# Patient Record
Sex: FEMALE | Race: WHITE | Employment: OTHER | URBAN - METROPOLITAN AREA
[De-identification: names, ages, dates, MRNs, and addresses within clinical notes are randomized per-mention and may not be internally consistent; named-entity substitution may affect disease eponyms.]

---

## 2018-01-26 ENCOUNTER — HOSPITAL ENCOUNTER (OUTPATIENT)
Dept: OTHER | Age: 78
Discharge: HOME | End: 2018-01-26
Attending: INTERNAL MEDICINE | Admitting: INTERNAL MEDICINE

## 2018-01-27 LAB
ESTIMATED AVERAGE GLUCOSE: 180 MG/DL
HBA1C MFR BLD: 7.9 %

## 2018-02-22 NOTE — PRE-PROCEDURE INSTRUCTIONS
1.  Do not eat or drink anything after 12 midnight prior to surgery. This includes no water, chewing gum or mints. 2.  Take the following pills with a small sip of water on the morning of surgery   3. Aspirin, Ibuprofen, Advil, Naproxen, Vitamin E and other Anti-inflammatory products should be stopped for 5 days before surgery or as directed by your physician. 4.  Check with your doctor regarding stopping Plavix, Coumadin, Lovenox, Fragmin or other blood thinners. 5.  Do not smoke and do not drink alcoholic beverages 24 hours prior to surgery. This includes NA Beer. 6.  You may brush your teeth and gargle the morning of surgery. DO NOT SWALLOW WATER.  7.  You MUST make arrangements for a responsible adult to take you home after your surgery. You will not be allowed to leave alone or drive yourself home. It is strongly suggested someone stay with you the first 24 hours. Your surgery will be cancelled if you do not have a ride home. 8.  A parent/legal guardian must accompany a child scheduled for surgery and plan to stay at the hospital until the child is discharged. Please do not bring other children with you. 9.  Please wear simple, loose fitting clothing to the hospital.  Kiet Lee not bring valuables ( money, credit cards, checkbooks, etc.)  Do not wear any makeup (including no eye makeup) or nail polish on your fingers or toes. 10.  Do not wear any jewelry or piercing on the day of surgery. All body piercing jewelry must be removed. 11.  If you have dentures, they will be removed before going to the OR; we will provide you a container. If you wear contact lenses or glasses, they will be removed; please bring a case for them. 12.  Please see your family doctor/pediatrician for a history & physical and/or concerning medications. Bring any test results/reports from your physician's office the day of surgery. 15.  Remember to bring Blood Bank Bracelet to the hospital on the day of surgery.   14.  If you have a Living Will and Durable Power of  for Healthcare, please bring in a copy. 13.  Notify your Surgeon if you develop any illness between now and surgery time; cough, cold, fever, sore throat, nausea, vomiting, etc.  Please notify your surgeon if you experience dizziness, shortness of breath or blurred vision between now and the time of your surgery. 16.  DO NOT shave your operative site 96 hours (4 days) prior to surgery. For face and neck surgery, men may use an electric razor 48 hours (2 days) prior to surgery. 17. Shower the night before surgery and the morning of surgery with  an antibacterial soap   or  Chlorhexidine gluconate (for total joint replacement). To provide excellent care, visitors will be limited to two in a room at any given time. Please no children under the age of 15 in the surgical department.

## 2018-02-26 ENCOUNTER — TELEPHONE (OUTPATIENT)
Dept: FAMILY MEDICINE CLINIC | Age: 78
End: 2018-02-26

## 2018-02-27 NOTE — TELEPHONE ENCOUNTER
Spoke w/ patient - just had a full PE so will not need EKG, last set of labs for fasting was about 4-6 months ago. She cannot make morning appts very well due to having a son at home on a vent and the [facility] RN doesn't come until after 11.  Made her an afternoon appt and will come back on another day for the BW

## 2018-03-01 ENCOUNTER — HOSPITAL ENCOUNTER (OUTPATIENT)
Dept: SURGERY | Age: 78
Discharge: HOME | End: 2018-03-01
Attending: OPHTHALMOLOGY | Admitting: OPHTHALMOLOGY

## 2018-03-01 VITALS
TEMPERATURE: 97.9 F | OXYGEN SATURATION: 97 % | SYSTOLIC BLOOD PRESSURE: 168 MMHG | BODY MASS INDEX: 31.71 KG/M2 | RESPIRATION RATE: 16 BRPM | HEIGHT: 67 IN | HEART RATE: 74 BPM | DIASTOLIC BLOOD PRESSURE: 64 MMHG | WEIGHT: 202 LBS

## 2018-03-01 RX ORDER — LIDOCAINE HYDROCHLORIDE 10 MG/ML
1 INJECTION, SOLUTION EPIDURAL; INFILTRATION; INTRACAUDAL; PERINEURAL
Status: COMPLETED | OUTPATIENT
Start: 2018-03-01 | End: 2018-03-01

## 2018-03-01 RX ORDER — MEPERIDINE HYDROCHLORIDE 25 MG/ML
12.5 INJECTION INTRAMUSCULAR; INTRAVENOUS; SUBCUTANEOUS EVERY 5 MIN PRN
Status: DISCONTINUED | OUTPATIENT
Start: 2018-03-01 | End: 2018-03-02 | Stop reason: HOSPADM

## 2018-03-01 RX ORDER — HYDRALAZINE HYDROCHLORIDE 20 MG/ML
5 INJECTION INTRAMUSCULAR; INTRAVENOUS EVERY 10 MIN PRN
Status: DISCONTINUED | OUTPATIENT
Start: 2018-03-01 | End: 2018-03-02 | Stop reason: HOSPADM

## 2018-03-01 RX ORDER — PREDNISOLONE ACETATE 10 MG/ML
1 SUSPENSION/ DROPS OPHTHALMIC SEE ADMIN INSTRUCTIONS
Status: DISCONTINUED | OUTPATIENT
Start: 2018-03-01 | End: 2018-03-02 | Stop reason: HOSPADM

## 2018-03-01 RX ORDER — DIPHENHYDRAMINE HYDROCHLORIDE 50 MG/ML
12.5 INJECTION INTRAMUSCULAR; INTRAVENOUS
Status: ACTIVE | OUTPATIENT
Start: 2018-03-01 | End: 2018-03-01

## 2018-03-01 RX ORDER — HYDROMORPHONE HCL 110MG/55ML
0.5 PATIENT CONTROLLED ANALGESIA SYRINGE INTRAVENOUS EVERY 5 MIN PRN
Status: DISCONTINUED | OUTPATIENT
Start: 2018-03-01 | End: 2018-03-02 | Stop reason: HOSPADM

## 2018-03-01 RX ORDER — SODIUM CHLORIDE 0.9 % (FLUSH) 0.9 %
10 SYRINGE (ML) INJECTION PRN
Status: DISCONTINUED | OUTPATIENT
Start: 2018-03-01 | End: 2018-03-02 | Stop reason: HOSPADM

## 2018-03-01 RX ORDER — SODIUM CHLORIDE, SODIUM LACTATE, POTASSIUM CHLORIDE, CALCIUM CHLORIDE 600; 310; 30; 20 MG/100ML; MG/100ML; MG/100ML; MG/100ML
INJECTION, SOLUTION INTRAVENOUS CONTINUOUS
Status: DISCONTINUED | OUTPATIENT
Start: 2018-03-01 | End: 2018-03-02 | Stop reason: HOSPADM

## 2018-03-01 RX ORDER — SODIUM CHLORIDE 0.9 % (FLUSH) 0.9 %
10 SYRINGE (ML) INJECTION EVERY 12 HOURS SCHEDULED
Status: DISCONTINUED | OUTPATIENT
Start: 2018-03-01 | End: 2018-03-02 | Stop reason: HOSPADM

## 2018-03-01 RX ORDER — MORPHINE SULFATE 4 MG/ML
1 INJECTION, SOLUTION INTRAMUSCULAR; INTRAVENOUS EVERY 5 MIN PRN
Status: DISCONTINUED | OUTPATIENT
Start: 2018-03-01 | End: 2018-03-02 | Stop reason: HOSPADM

## 2018-03-01 RX ORDER — LABETALOL HYDROCHLORIDE 5 MG/ML
5 INJECTION, SOLUTION INTRAVENOUS EVERY 10 MIN PRN
Status: DISCONTINUED | OUTPATIENT
Start: 2018-03-01 | End: 2018-03-02 | Stop reason: HOSPADM

## 2018-03-01 RX ORDER — TOBRAMYCIN AND DEXAMETHASONE 3; 1 MG/ML; MG/ML
1 SUSPENSION/ DROPS OPHTHALMIC SEE ADMIN INSTRUCTIONS
Status: DISCONTINUED | OUTPATIENT
Start: 2018-03-01 | End: 2018-03-02 | Stop reason: HOSPADM

## 2018-03-01 RX ORDER — METOCLOPRAMIDE HYDROCHLORIDE 5 MG/ML
10 INJECTION INTRAMUSCULAR; INTRAVENOUS
Status: ACTIVE | OUTPATIENT
Start: 2018-03-01 | End: 2018-03-01

## 2018-03-01 RX ORDER — OXYCODONE HYDROCHLORIDE 5 MG/1
5 TABLET ORAL PRN
Status: ACTIVE | OUTPATIENT
Start: 2018-03-01 | End: 2018-03-01

## 2018-03-01 RX ORDER — HYDROMORPHONE HCL 110MG/55ML
0.25 PATIENT CONTROLLED ANALGESIA SYRINGE INTRAVENOUS EVERY 5 MIN PRN
Status: DISCONTINUED | OUTPATIENT
Start: 2018-03-01 | End: 2018-03-02 | Stop reason: HOSPADM

## 2018-03-01 RX ORDER — OXYCODONE HYDROCHLORIDE 5 MG/1
10 TABLET ORAL PRN
Status: ACTIVE | OUTPATIENT
Start: 2018-03-01 | End: 2018-03-01

## 2018-03-01 RX ORDER — PROMETHAZINE HYDROCHLORIDE 25 MG/ML
6.25 INJECTION, SOLUTION INTRAMUSCULAR; INTRAVENOUS
Status: ACTIVE | OUTPATIENT
Start: 2018-03-01 | End: 2018-03-01

## 2018-03-01 RX ADMIN — SODIUM CHLORIDE, SODIUM LACTATE, POTASSIUM CHLORIDE, CALCIUM CHLORIDE: 600; 310; 30; 20 INJECTION, SOLUTION INTRAVENOUS at 10:20

## 2018-03-01 RX ADMIN — LIDOCAINE HYDROCHLORIDE 1 ML: 10 INJECTION, SOLUTION EPIDURAL; INFILTRATION; INTRACAUDAL; PERINEURAL at 10:20

## 2018-03-01 ASSESSMENT — PAIN - FUNCTIONAL ASSESSMENT: PAIN_FUNCTIONAL_ASSESSMENT: 0-10

## 2018-03-01 ASSESSMENT — PAIN SCALES - GENERAL
PAINLEVEL_OUTOF10: 0
PAINLEVEL_OUTOF10: 0

## 2018-03-01 NOTE — ANESTHESIA POST-OP
Postoperative Anesthesia Note    Name:    Malena Cadet  MRN:      [id number]    Patient Vitals for the past 12 hrs:   BP Temp Temp src Pulse Resp SpO2   03/01/18 1327 (!) 168/64 - Temporal 74 16 97 %   03/01/18 1239 (!) 171/67 97.9 °F (36.6 °C) Temporal 75 16 97 %   03/01/18 1005 (!) 145/84 99 °F (37.2 °C) Temporal 85 16 96 %        LABS:    CBC  Lab Results   Component Value Date/Time    WBC 6.5 05/25/2016 10:45 AM    HGB 11.5 (L) 05/25/2016 10:45 AM    HCT 34.3 (L) 05/25/2016 10:45 AM     05/25/2016 10:45 AM     RENAL  Lab Results   Component Value Date/Time     05/25/2016 10:45 AM    K 4.3 05/25/2016 10:45 AM     05/25/2016 10:45 AM    CO2 24 05/25/2016 10:45 AM    BUN 20 05/25/2016 10:45 AM    CREATININE 0.9 05/25/2016 10:45 AM    GLUCOSE 159 (H) 05/25/2016 10:45 AM     COAGS  No results found for: PROTIME, INR, APTT    Intake & Output: In: 400 [I.V.:400]  Out: -     Nausea & Vomiting:  No    Level of Consciousness:  Awake    Pain Assessment:  Adequate analgesia    Anesthesia Complications:  No apparent anesthetic complications    SUMMARY      Vital signs stable  OK to discharge from Stage I post anesthesia care.   Care transferred from Anesthesiology department on discharge from perioperative area

## 2018-03-01 NOTE — ANESTHESIA PRE-OP
Department of Anesthesiology  Preprocedure Note       Name:  Yevgeniy Chow   Age:  69 y.o.  :  1940                                          MRN:  [id number]         Date:  3/1/2018      Surgeon: Tania Boone    Procedure: Phacoemulsification with Intraocular Lens Implant of the Right Eye    Medications prior to admission:   Prior to Admission medications    Medication Sig Start Date End Date Taking? Authorizing Provider   hydrochlorothiazide (HYDRODIURIL) 25 MG tablet TAKE ONE TABLET BY MOUTH DAILY 1/9/15  Yes Myra Robles MD   levothyroxine (SYNTHROID) 100 MCG tablet Take 1 tablet by mouth daily for 30 days. 13 Yes Myra Robles MD   glipiZIDE (GLUCOTROL) 5 MG tablet Take 1 tablet by mouth 2 times daily. Patient taking differently: Take 10 mg by mouth 2 times daily  13  Yes Myra Robles MD   losartan (COZAAR) 50 MG tablet Take 50 mg by mouth daily. Yes Historical Provider, MD       Current medications:    Current Outpatient Prescriptions   Medication Sig Dispense Refill    hydrochlorothiazide (HYDRODIURIL) 25 MG tablet TAKE ONE TABLET BY MOUTH DAILY 30 tablet 0    levothyroxine (SYNTHROID) 100 MCG tablet Take 1 tablet by mouth daily for 30 days. 30 tablet 2    glipiZIDE (GLUCOTROL) 5 MG tablet Take 1 tablet by mouth 2 times daily. (Patient taking differently: Take 10 mg by mouth 2 times daily ) 60 tablet 2    losartan (COZAAR) 50 MG tablet Take 50 mg by mouth daily.        Current Facility-Administered Medications   Medication Dose Route Frequency Provider Last Rate Last Dose    bromfenac (PROLENSA) 0.07 % ophthalmic solution 1 drop  1 drop Right Eye See Admin Instructions Kyra Warren MD        bupivacaine 0.75%, phenylephrine 10%, tropicamide 1%, cyclopentolate 1%, moxifloxacin 0.5%, ketorolac 0.5% in lidocaine 2% jelly ophthalmic solution  0.3 mL Right Eye Q10 Min PRN Kyra Warren MD   0.3 mL at 18 1016    prednisoLONE acetate (PRED

## 2018-03-01 NOTE — PROGRESS NOTES
Assessment unchanged from previous. Verbalizes understanding of discharge instructions and written instructions also given to patient. Questions and concerns addressed at this time. Pt's friend at bedside. Denies pain or any needs at this time. IV d/c'd. Pt discharged via wheelchair to car in good condition. All personal belongings returned to pt.

## 2018-03-08 ENCOUNTER — OFFICE VISIT (OUTPATIENT)
Dept: FAMILY MEDICINE CLINIC | Age: 78
End: 2018-03-08

## 2018-03-08 VITALS
DIASTOLIC BLOOD PRESSURE: 70 MMHG | BODY MASS INDEX: 31.67 KG/M2 | SYSTOLIC BLOOD PRESSURE: 128 MMHG | HEART RATE: 86 BPM | OXYGEN SATURATION: 98 % | WEIGHT: 202.2 LBS

## 2018-03-08 DIAGNOSIS — M89.9 BONE DISORDER: ICD-10-CM

## 2018-03-08 DIAGNOSIS — M25.561 CHRONIC PAIN OF RIGHT KNEE: ICD-10-CM

## 2018-03-08 DIAGNOSIS — E78.2 MIXED HYPERLIPIDEMIA: ICD-10-CM

## 2018-03-08 DIAGNOSIS — E11.65 TYPE 2 DIABETES MELLITUS WITH HYPERGLYCEMIA, WITHOUT LONG-TERM CURRENT USE OF INSULIN (HCC): Primary | ICD-10-CM

## 2018-03-08 DIAGNOSIS — E03.9 HYPOTHYROIDISM, UNSPECIFIED TYPE: ICD-10-CM

## 2018-03-08 DIAGNOSIS — Z12.31 ENCOUNTER FOR SCREENING MAMMOGRAM FOR MALIGNANT NEOPLASM OF BREAST: ICD-10-CM

## 2018-03-08 DIAGNOSIS — G89.29 CHRONIC PAIN OF RIGHT KNEE: ICD-10-CM

## 2018-03-08 DIAGNOSIS — Z12.39 ENCOUNTER FOR BREAST CANCER SCREENING OTHER THAN MAMMOGRAM: ICD-10-CM

## 2018-03-08 PROBLEM — Z78.9 STATIN INTOLERANCE: Status: ACTIVE | Noted: 2017-06-14

## 2018-03-08 PROBLEM — R01.1 HEART MURMUR: Status: ACTIVE | Noted: 2018-03-08

## 2018-03-08 PROBLEM — E05.90 THYROTOXICOSIS: Status: ACTIVE | Noted: 2018-03-08

## 2018-03-08 LAB — HBA1C MFR BLD: 7.9 %

## 2018-03-08 PROCEDURE — G8400 PT W/DXA NO RESULTS DOC: HCPCS | Performed by: NURSE PRACTITIONER

## 2018-03-08 PROCEDURE — G8484 FLU IMMUNIZE NO ADMIN: HCPCS | Performed by: NURSE PRACTITIONER

## 2018-03-08 PROCEDURE — 4040F PNEUMOC VAC/ADMIN/RCVD: CPT | Performed by: NURSE PRACTITIONER

## 2018-03-08 PROCEDURE — 1036F TOBACCO NON-USER: CPT | Performed by: NURSE PRACTITIONER

## 2018-03-08 PROCEDURE — G8427 DOCREV CUR MEDS BY ELIG CLIN: HCPCS | Performed by: NURSE PRACTITIONER

## 2018-03-08 PROCEDURE — 1123F ACP DISCUSS/DSCN MKR DOCD: CPT | Performed by: NURSE PRACTITIONER

## 2018-03-08 PROCEDURE — G8417 CALC BMI ABV UP PARAM F/U: HCPCS | Performed by: NURSE PRACTITIONER

## 2018-03-08 PROCEDURE — 83036 HEMOGLOBIN GLYCOSYLATED A1C: CPT | Performed by: NURSE PRACTITIONER

## 2018-03-08 PROCEDURE — 1090F PRES/ABSN URINE INCON ASSESS: CPT | Performed by: NURSE PRACTITIONER

## 2018-03-08 PROCEDURE — 99204 OFFICE O/P NEW MOD 45 MIN: CPT | Performed by: NURSE PRACTITIONER

## 2018-03-08 RX ORDER — GLIPIZIDE 10 MG/1
TABLET, FILM COATED, EXTENDED RELEASE ORAL 2 TIMES DAILY
COMMUNITY
Start: 2018-02-15 | End: 2018-03-13 | Stop reason: SDUPTHER

## 2018-03-08 ASSESSMENT — PATIENT HEALTH QUESTIONNAIRE - PHQ9
1. LITTLE INTEREST OR PLEASURE IN DOING THINGS: 0
SUM OF ALL RESPONSES TO PHQ9 QUESTIONS 1 & 2: 0
2. FEELING DOWN, DEPRESSED OR HOPELESS: 0
SUM OF ALL RESPONSES TO PHQ QUESTIONS 1-9: 0

## 2018-03-08 ASSESSMENT — ENCOUNTER SYMPTOMS
RESPIRATORY NEGATIVE: 1
BACK PAIN: 0
EYES NEGATIVE: 1
GASTROINTESTINAL NEGATIVE: 1

## 2018-03-08 NOTE — PROGRESS NOTES
states that she tries to watch her carb intake; however, she likes her bread. Patient admits that she is taking her glipizide at home. Patient states that she used to walk more; however, since her fall 3 years ago that caused her to get right knee surgery she does not walk as much. She states she is really active though. She states she moves her grass and takes care of her yard. Patient denies any episodes of hypoglycemia. Patient denies any increased hunger, increased thirst, increased urination. Patient states she is currently taking levothyroxine 100 µg daily. She states she is doing well on the medication. She takes medication every day in the morning on empty stomach with water. She denies any weight changes, palpitations, diarrhea, dry skin, constipation, changes in her energy level. Patient states she does have chronic right knee pain. She states she tries to stretch out her knee daily. Patient states she does not take any medication for her knee pain and does not want to. Patient states she is not interested in physical therapy at this time really to time constraints with her taking care of her son. Patient meds that she had cataract surgery last week in her left eye. She is scheduled to have her right eye surgery in the future. She does not know when her next cataract surgery on her right eye is going to be. Dr. Bety Humphries did her eye surgery.     Past Medical History:   Diagnosis Date    Hypertension     Hypothyroidism     Type 2 diabetes mellitus without complication ([address]     Type II or unspecified type diabetes mellitus without mention of complication, not stated as uncontrolled       Past Surgical History:   Procedure Laterality Date    ANKLE FRACTURE SURGERY Right 2007    CARPAL TUNNEL RELEASE Left 1998    CATARACT REMOVAL WITH IMPLANT Right 03/01/2018     PHACO EMULSIFICATION OF CATARACT WITH INTRA OCULAR LENS IMPLANT RIGHT EYE    HYSTERECTOMY      KNEE ARTHROSCOPY Right 2015       Family Cuff Size: Large Adult   Pulse: 86   SpO2: 98%   Weight: 202 lb 3.2 oz (91.7 kg)     Wt Readings from Last 3 Encounters:   03/08/18 202 lb 3.2 oz (91.7 kg)   02/22/18 202 lb (91.6 kg)   08/21/13 219 lb (99.3 kg)     Temp Readings from Last 3 Encounters:   03/01/18 97.9 °F (36.6 °C) (Temporal)     BP Readings from Last 3 Encounters:   03/08/18 128/70   03/01/18 (!) 168/64   08/21/13 142/80     Pulse Readings from Last 3 Encounters:   03/08/18 86   03/01/18 74   08/21/13 76     Physical Exam   Constitutional: She is oriented to person, place, and time. She appears well-developed and well-nourished. No distress. Obese    HENT:   Head: Normocephalic and atraumatic. Right Ear: External ear normal.   Left Ear: External ear normal.   Nose: Nose normal.   Mouth/Throat: Oropharynx is clear and moist. No oropharyngeal exudate. Eyes: Conjunctivae and EOM are normal. Pupils are equal, round, and reactive to light. Right eye exhibits no discharge. Left eye exhibits no discharge. No scleral icterus. Neck: Normal range of motion. Neck supple. No JVD present. No tracheal deviation present. No thyromegaly present. Cardiovascular: Normal rate, regular rhythm and intact distal pulses. Exam reveals no gallop and no friction rub. Murmur heard. Systolic murmur is present with a grade of 2/6   Pulmonary/Chest: Effort normal and breath sounds normal. No stridor. No respiratory distress. She has no wheezes. She has no rales. She exhibits no tenderness. Abdominal: Soft. Bowel sounds are normal. She exhibits no distension and no mass. There is no tenderness. There is no rebound and no guarding. Musculoskeletal: Normal range of motion. She exhibits edema (BLE - trace). She exhibits no tenderness or deformity. Lymphadenopathy:     She has no cervical adenopathy. Neurological: She is alert and oriented to person, place, and time. Skin: Skin is warm and dry. No rash noted. She is not diaphoretic. No erythema.  No outside. Hypothyroid symptoms stable. Will continue on levothyroxin daily. Recommend blood work in 3 months. Recommend appointment in 3 months. Patient is to get blood work one week prior to her appointment. Diagnoses and all orders for this visit:    Type 2 diabetes mellitus with hyperglycemia, without long-term current use of insulin (HCC)  -     POCT glycosylated hemoglobin (Hb A1C)  -     CBC Auto Differential; Future  -     Comprehensive Metabolic Panel; Future  -     Hemoglobin A1C; Future  -     Magnesium; Future    Chronic pain of right knee    Encounter for breast cancer screening other than mammogram    Encounter for screening mammogram for malignant neoplasm of breast  -     Mammography Screening    Hypothyroidism, unspecified type  -     T4, Free; Future  -     TSH without Reflex; Future    Bone disorder  -     Vitamin D 25 Hydroxy; Future    Mixed hyperlipidemia  -     Lipid Panel; Future    I have recommended that this patient have a dexa scan, shingles vaccine, TDAP but she declines at this time. I have discussed the risks and benefits of this procedure with her. The patient verbalizes understanding. Prior to Visit Medications    Medication Sig Taking? Authorizing Provider   glipiZIDE (GLUCOTROL XL) 10 MG extended release tablet 2 times daily Yes Historical Provider, MD   aspirin 81 MG tablet Take 81 mg by mouth Yes Historical Provider, MD   hydrochlorothiazide (HYDRODIURIL) 25 MG tablet TAKE ONE TABLET BY MOUTH DAILY Yes Lorne Hernandez MD   losartan (COZAAR) 50 MG tablet Take 50 mg by mouth daily. Yes Historical Provider, MD   levothyroxine (SYNTHROID) 100 MCG tablet Take 1 tablet by mouth daily for 30 days. Lorne Hernandez MD     No orders of the defined types were placed in this encounter. Return in about 3 months (around 6/8/2018) for DM - follow up .     Patient should call the office immediately with new or ongoing signs or symptoms or worsening, or

## 2018-03-08 NOTE — PATIENT INSTRUCTIONS
you take decongestants or anti-inflammatory medicine, such as ibuprofen. Some of these medicines can raise blood pressure. · Learn how to check your blood pressure at home. Lifestyle changes  · Stay at a healthy weight. This is especially important if you put on weight around the waist. Losing even 10 pounds can help you lower your blood pressure. · If your doctor recommends it, get more exercise. Walking is a good choice. Bit by bit, increase the amount you walk every day. Try for at least 30 minutes on most days of the week. You also may want to swim, bike, or do other activities. · Avoid or limit alcohol. Talk to your doctor about whether you can drink any alcohol. · Try to limit how much sodium you eat to less than 2,300 milligrams (mg) a day. Your doctor may ask you to try to eat less than 1,500 mg a day. · Eat plenty of fruits (such as bananas and oranges), vegetables, legumes, whole grains, and low-fat dairy products. · Lower the amount of saturated fat in your diet. Saturated fat is found in animal products such as milk, cheese, and meat. Limiting these foods may help you lose weight and also lower your risk for heart disease. · Do not smoke. Smoking increases your risk for heart attack and stroke. If you need help quitting, talk to your doctor about stop-smoking programs and medicines. These can increase your chances of quitting for good. When should you call for help? Call 911 anytime you think you may need emergency care. This may mean having symptoms that suggest that your blood pressure is causing a serious heart or blood vessel problem. Your blood pressure may be over 180/110. ? For example, call 911 if:  ? · You have symptoms of a heart attack. These may include:  ¨ Chest pain or pressure, or a strange feeling in the chest.  ¨ Sweating. ¨ Shortness of breath. ¨ Nausea or vomiting.   ¨ Pain, pressure, or a strange feeling in the back, neck, jaw, or upper belly or in one or both shoulders or arms.  ¨ Lightheadedness or sudden weakness. ¨ A fast or irregular heartbeat. ? · You have symptoms of a stroke. These may include:  ¨ Sudden numbness, tingling, weakness, or loss of movement in your face, arm, or leg, especially on only one side of your body. ¨ Sudden vision changes. ¨ Sudden trouble speaking. ¨ Sudden confusion or trouble understanding simple statements. ¨ Sudden problems with walking or balance. ¨ A sudden, severe headache that is different from past headaches. ? · You have severe back or belly pain. ?Do not wait until your blood pressure comes down on its own. Get help right away. ?Call your doctor now or seek immediate care if:  ? · Your blood pressure is much higher than normal (such as 180/110 or higher), but you don't have symptoms. ? · You think high blood pressure is causing symptoms, such as:  ¨ Severe headache. ¨ Blurry vision. ? Watch closely for changes in your health, and be sure to contact your doctor if:  ? · Your blood pressure measures 140/90 or higher at least 2 times. That means the top number is 140 or higher or the bottom number is 90 or higher, or both. ? · You think you may be having side effects from your blood pressure medicine. ? · Your blood pressure is usually normal, but it goes above normal at least 2 times. Where can you learn more? Go to https://Pocket Videope[employer]antrange.[employer]. org and sign in to your [employer] account. Enter U741 in the [employer] box to learn more about \"High Blood Pressure: Care Instructions. \"     If you do not have an account, please click on the \"Sign Up Now\" link. Current as of: Adelina 10, 2017  Content Version: 11.5  © 7115-2026 [employer]. Care instructions adapted under license by [facility] ([facility]).  If you have questions about a medical condition or this instruction, always ask your healthcare professional. Shayla Francisco any warranty or liability for your use of this information. Patient Education        Patellofemoral Pain Syndrome (Runner's Knee): Exercises  Your Care Instructions  Here are some examples of typical rehabilitation exercises for your condition. Start each exercise slowly. Ease off the exercise if you start to have pain. Your doctor or physical therapist will tell you when you can start these exercises and which ones will work best for you. How to do the exercises  Calf wall stretch    1. Stand facing a wall with your hands on the wall at about eye level. Put your affected leg about a step behind your other leg. 2. Keeping your back leg straight and your back heel on the floor, bend your front knee and gently bring your hip and chest toward the wall until you feel a stretch in the calf of your back leg. 3. Hold the stretch for at least 15 to 30 seconds. 4. Repeat 2 to 4 times. 5. Repeat steps 1 through 4, but this time keep your back knee bent. Quadriceps stretch    1. If you are not steady on your feet, hold on to a chair, counter, or wall. 2. Bend your affected leg, and reach behind you to grab the front of your foot or ankle with the hand on the same side. For example, if you are stretching your right leg, use your right hand. 3. Keeping your knees next to each other, pull your foot toward your buttock until you feel a gentle stretch across the front of your hip and down the front of your thigh. Your knee should be pointed directly to the ground, and not out to the side. 4. Hold the stretch for at least 15 to 30 seconds. 5. Repeat 2 to 4 times. Hamstring wall stretch    1. Lie on your back in a doorway, with your good leg through the open door. 2. Slide your affected leg up the wall to straighten your knee. You should feel a gentle stretch down the back of your leg. 1. Do not arch your back. 2. Do not bend either knee. 3. Keep one heel touching the floor and the other heel touching the wall. Do not point your toes.   3. Hold the stretch for ceiling). 2. Lift your toes about 6 inches off the floor, hold for about 6 seconds, then lower slowly. 3. Do 8 to 12 repetitions. Wall slide with ball squeeze    1. Stand with your back against a wall and with your feet about shoulder-width apart. Your feet should be about 12 inches away from the wall. 2. Put a ball about the size of a soccer ball between your knees. Then slowly slide down the wall until your knees are bent about 20 to 30 degrees. 3. Tighten your thigh muscles by squeezing the ball between your knees. Hold that position for about 10 seconds, then stop squeezing. Rest for up to 10 seconds between repetitions. 4. Repeat 8 to 12 times. Follow-up care is a key part of your treatment and safety. Be sure to make and go to all appointments, and call your doctor if you are having problems. It's also a good idea to know your test results and keep a list of the medicines you take. Where can you learn more? Go to https://[employer]att Technologies.ScreenHits. org and sign in to your [employer] account. Enter A404 in the [employer] box to learn more about \"Patellofemoral Pain Syndrome (Runner's Knee): Exercises. \"     If you do not have an account, please click on the \"Sign Up Now\" link. Current as of: March 21, 2017  Content Version: 11.5  © 2672-3875 [employer]. Care instructions adapted under license by [facility] If you have questions about a medical condition or this instruction, always ask your healthcare professional. [place] [address] any warranty or liability for your use of this information. Patient Education        Saline Nasal Washes: Care Instructions  Your Care Instructions  Saline nasal washes help keep the nasal passages open by washing out thick or dried mucus. This simple remedy can help relieve symptoms of allergies, sinusitis, and colds.  It also can make the nose feel more comfortable by keeping the mucous membranes moist. You may notice a 5681-8720 [employer]. Care instructions adapted under license by [facility] ([facility]). If you have questions about a medical condition or this instruction, always ask your healthcare professional. [address] any warranty or liability for your use of this information.

## 2018-03-13 RX ORDER — GLIPIZIDE 10 MG/1
10 TABLET, FILM COATED, EXTENDED RELEASE ORAL 2 TIMES DAILY
Qty: 60 TABLET | Refills: 2 | Status: SHIPPED | OUTPATIENT
Start: 2018-03-13 | End: 2018-07-01 | Stop reason: SDUPTHER

## 2018-03-26 DIAGNOSIS — Z12.39 SCREENING FOR BREAST CANCER: Primary | ICD-10-CM

## 2018-05-09 ENCOUNTER — HOSPITAL ENCOUNTER (OUTPATIENT)
Dept: MAMMOGRAPHY | Age: 78
Discharge: HOME | End: 2018-05-09
Attending: NURSE PRACTITIONER | Admitting: NURSE PRACTITIONER

## 2018-05-09 DIAGNOSIS — Z12.31 ENCOUNTER FOR SCREENING MAMMOGRAM FOR BREAST CANCER: ICD-10-CM

## 2018-05-30 RX ORDER — LEVOTHYROXINE SODIUM 0.1 MG/1
100 TABLET ORAL DAILY
Qty: 90 TABLET | Refills: 0 | Status: SHIPPED | OUTPATIENT
Start: 2018-05-30 | End: 2018-08-07 | Stop reason: SDUPTHER

## 2018-06-01 ENCOUNTER — HOSPITAL ENCOUNTER (OUTPATIENT)
Dept: OTHER | Age: 78
Discharge: HOME | End: 2018-06-01
Attending: NURSE PRACTITIONER | Admitting: NURSE PRACTITIONER

## 2018-06-01 DIAGNOSIS — E03.9 HYPOTHYROIDISM, UNSPECIFIED TYPE: ICD-10-CM

## 2018-06-01 DIAGNOSIS — E11.65 TYPE 2 DIABETES MELLITUS WITH HYPERGLYCEMIA, WITHOUT LONG-TERM CURRENT USE OF INSULIN (HCC): ICD-10-CM

## 2018-06-01 DIAGNOSIS — M89.9 BONE DISORDER: ICD-10-CM

## 2018-06-01 DIAGNOSIS — E78.2 MIXED HYPERLIPIDEMIA: ICD-10-CM

## 2018-06-01 LAB
A/G RATIO: 1.4 (ref 1.1–2.2)
ALBUMIN SERPL-MCNC: 4.2 G/DL (ref 3.4–5)
ALP BLD-CCNC: 97 U/L (ref 40–129)
ALT SERPL-CCNC: 15 U/L (ref 10–40)
ANION GAP SERPL CALCULATED.3IONS-SCNC: 16 MMOL/L (ref 3–16)
AST SERPL-CCNC: 23 U/L (ref 15–37)
BASOPHILS ABSOLUTE: 0.1 K/UL (ref 0–0.2)
BASOPHILS RELATIVE PERCENT: 0.9 %
BILIRUB SERPL-MCNC: 0.4 MG/DL (ref 0–1)
BUN BLDV-MCNC: 21 MG/DL (ref 7–20)
CALCIUM SERPL-MCNC: 8.9 MG/DL (ref 8.3–10.6)
CHLORIDE BLD-SCNC: 102 MMOL/L (ref 99–110)
CHOLESTEROL, TOTAL: 194 MG/DL (ref 0–199)
CO2: 23 MMOL/L (ref 21–32)
CREAT SERPL-MCNC: 1 MG/DL (ref 0.6–1.2)
EOSINOPHILS ABSOLUTE: 0.1 K/UL (ref 0–0.6)
EOSINOPHILS RELATIVE PERCENT: 1.5 %
GFR AFRICAN AMERICAN: >60
GFR NON-AFRICAN AMERICAN: 54
GLOBULIN: 3 G/DL
GLUCOSE BLD-MCNC: 101 MG/DL (ref 70–99)
HCT VFR BLD CALC: 33 % (ref 36–48)
HDLC SERPL-MCNC: 63 MG/DL (ref 40–60)
HEMOGLOBIN: 11 G/DL (ref 12–16)
LDL CHOLESTEROL CALCULATED: 111 MG/DL
LYMPHOCYTES ABSOLUTE: 1.8 K/UL (ref 1–5.1)
LYMPHOCYTES RELATIVE PERCENT: 28.2 %
MAGNESIUM: 2.2 MG/DL (ref 1.8–2.4)
MCH RBC QN AUTO: 30.5 PG (ref 26–34)
MCHC RBC AUTO-ENTMCNC: 33.4 G/DL (ref 31–36)
MCV RBC AUTO: 91.4 FL (ref 80–100)
MONOCYTES ABSOLUTE: 0.4 K/UL (ref 0–1.3)
MONOCYTES RELATIVE PERCENT: 6.9 %
NEUTROPHILS ABSOLUTE: 4 K/UL (ref 1.7–7.7)
NEUTROPHILS RELATIVE PERCENT: 62.5 %
PDW BLD-RTO: 14 % (ref 12.4–15.4)
PLATELET # BLD: 330 K/UL (ref 135–450)
PMV BLD AUTO: 7.8 FL (ref 5–10.5)
POTASSIUM SERPL-SCNC: 4.4 MMOL/L (ref 3.5–5.1)
RBC # BLD: 3.61 M/UL (ref 4–5.2)
SODIUM BLD-SCNC: 141 MMOL/L (ref 136–145)
T4 FREE: 1.6 NG/DL (ref 0.9–1.8)
TOTAL PROTEIN: 7.2 G/DL (ref 6.4–8.2)
TRIGL SERPL-MCNC: 99 MG/DL (ref 0–150)
TSH SERPL DL<=0.05 MIU/L-ACNC: 2.64 UIU/ML (ref 0.27–4.2)
VITAMIN D 25-HYDROXY: 26.5 NG/ML
VLDLC SERPL CALC-MCNC: 20 MG/DL
WBC # BLD: 6.4 K/UL (ref 4–11)

## 2018-06-02 PROBLEM — E55.9 VITAMIN D DEFICIENCY: Status: ACTIVE | Noted: 2018-06-02

## 2018-06-02 LAB
ESTIMATED AVERAGE GLUCOSE: 174.3 MG/DL
HBA1C MFR BLD: 7.7 %

## 2018-06-04 DIAGNOSIS — D64.9 ANEMIA, UNSPECIFIED TYPE: Primary | ICD-10-CM

## 2018-06-11 ENCOUNTER — OFFICE VISIT (OUTPATIENT)
Dept: FAMILY MEDICINE CLINIC | Age: 78
End: 2018-06-11

## 2018-06-11 VITALS
TEMPERATURE: 97 F | SYSTOLIC BLOOD PRESSURE: 130 MMHG | HEIGHT: 67 IN | BODY MASS INDEX: 31.83 KG/M2 | OXYGEN SATURATION: 97 % | WEIGHT: 202.8 LBS | DIASTOLIC BLOOD PRESSURE: 70 MMHG | HEART RATE: 86 BPM

## 2018-06-11 DIAGNOSIS — H53.8 BLURRY VISION, BILATERAL: ICD-10-CM

## 2018-06-11 DIAGNOSIS — E11.9 TYPE 2 DIABETES MELLITUS WITHOUT COMPLICATION, WITHOUT LONG-TERM CURRENT USE OF INSULIN (HCC): ICD-10-CM

## 2018-06-11 DIAGNOSIS — H25.89 OTHER AGE-RELATED CATARACT OF BOTH EYES: ICD-10-CM

## 2018-06-11 DIAGNOSIS — Z01.818 PREOPERATIVE EXAMINATION: Primary | ICD-10-CM

## 2018-06-11 PROCEDURE — G8427 DOCREV CUR MEDS BY ELIG CLIN: HCPCS | Performed by: NURSE PRACTITIONER

## 2018-06-11 PROCEDURE — G8417 CALC BMI ABV UP PARAM F/U: HCPCS | Performed by: NURSE PRACTITIONER

## 2018-06-11 PROCEDURE — 99213 OFFICE O/P EST LOW 20 MIN: CPT | Performed by: NURSE PRACTITIONER

## 2018-06-11 PROCEDURE — 1090F PRES/ABSN URINE INCON ASSESS: CPT | Performed by: NURSE PRACTITIONER

## 2018-06-14 ENCOUNTER — HOSPITAL ENCOUNTER (OUTPATIENT)
Dept: SURGERY | Age: 78
Discharge: HOME | End: 2018-06-14
Attending: OPHTHALMOLOGY | Admitting: OPHTHALMOLOGY

## 2018-06-14 VITALS
HEIGHT: 67 IN | BODY MASS INDEX: 31.39 KG/M2 | DIASTOLIC BLOOD PRESSURE: 68 MMHG | HEART RATE: 80 BPM | WEIGHT: 200 LBS | OXYGEN SATURATION: 96 % | RESPIRATION RATE: 16 BRPM | TEMPERATURE: 98 F | SYSTOLIC BLOOD PRESSURE: 132 MMHG

## 2018-06-14 LAB
GLUCOSE BLD-MCNC: 107 MG/DL (ref 70–99)
PERFORMED ON: ABNORMAL

## 2018-06-14 RX ORDER — SODIUM CHLORIDE, SODIUM LACTATE, POTASSIUM CHLORIDE, CALCIUM CHLORIDE 600; 310; 30; 20 MG/100ML; MG/100ML; MG/100ML; MG/100ML
INJECTION, SOLUTION INTRAVENOUS CONTINUOUS
Status: DISCONTINUED | OUTPATIENT
Start: 2018-06-14 | End: 2018-06-15 | Stop reason: HOSPADM

## 2018-06-14 RX ORDER — TOBRAMYCIN AND DEXAMETHASONE 3; 1 MG/ML; MG/ML
1 SUSPENSION/ DROPS OPHTHALMIC SEE ADMIN INSTRUCTIONS
Status: DISCONTINUED | OUTPATIENT
Start: 2018-06-14 | End: 2018-06-15 | Stop reason: HOSPADM

## 2018-06-14 RX ORDER — PREDNISOLONE ACETATE 10 MG/ML
1 SUSPENSION/ DROPS OPHTHALMIC SEE ADMIN INSTRUCTIONS
Status: DISCONTINUED | OUTPATIENT
Start: 2018-06-14 | End: 2018-06-15 | Stop reason: HOSPADM

## 2018-06-14 RX ADMIN — SODIUM CHLORIDE, SODIUM LACTATE, POTASSIUM CHLORIDE, CALCIUM CHLORIDE: 600; 310; 30; 20 INJECTION, SOLUTION INTRAVENOUS at 10:28

## 2018-06-14 ASSESSMENT — PAIN SCALES - GENERAL: PAINLEVEL_OUTOF10: 0

## 2018-07-02 RX ORDER — GLIPIZIDE 10 MG/1
TABLET, FILM COATED, EXTENDED RELEASE ORAL
Qty: 60 TABLET | Refills: 0 | Status: SHIPPED | OUTPATIENT
Start: 2018-07-02 | End: 2018-08-07 | Stop reason: SDUPTHER

## 2018-08-07 ENCOUNTER — OFFICE VISIT (OUTPATIENT)
Dept: FAMILY MEDICINE CLINIC | Age: 78
End: 2018-08-07

## 2018-08-07 VITALS
OXYGEN SATURATION: 96 % | HEIGHT: 67 IN | TEMPERATURE: 98.2 F | RESPIRATION RATE: 16 BRPM | HEART RATE: 82 BPM | DIASTOLIC BLOOD PRESSURE: 64 MMHG | SYSTOLIC BLOOD PRESSURE: 130 MMHG | WEIGHT: 203 LBS | BODY MASS INDEX: 31.86 KG/M2

## 2018-08-07 DIAGNOSIS — E11.9 TYPE 2 DIABETES MELLITUS WITHOUT COMPLICATION, WITHOUT LONG-TERM CURRENT USE OF INSULIN (HCC): Primary | ICD-10-CM

## 2018-08-07 DIAGNOSIS — E03.8 OTHER SPECIFIED HYPOTHYROIDISM: ICD-10-CM

## 2018-08-07 DIAGNOSIS — E55.9 VITAMIN D DEFICIENCY: ICD-10-CM

## 2018-08-07 DIAGNOSIS — E78.2 MIXED HYPERLIPIDEMIA: ICD-10-CM

## 2018-08-07 DIAGNOSIS — I10 ESSENTIAL HYPERTENSION: ICD-10-CM

## 2018-08-07 LAB — HBA1C MFR BLD: 8.1 %

## 2018-08-07 PROCEDURE — G8400 PT W/DXA NO RESULTS DOC: HCPCS | Performed by: NURSE PRACTITIONER

## 2018-08-07 PROCEDURE — 1090F PRES/ABSN URINE INCON ASSESS: CPT | Performed by: NURSE PRACTITIONER

## 2018-08-07 PROCEDURE — 1123F ACP DISCUSS/DSCN MKR DOCD: CPT | Performed by: NURSE PRACTITIONER

## 2018-08-07 PROCEDURE — 1101F PT FALLS ASSESS-DOCD LE1/YR: CPT | Performed by: NURSE PRACTITIONER

## 2018-08-07 PROCEDURE — G8427 DOCREV CUR MEDS BY ELIG CLIN: HCPCS | Performed by: NURSE PRACTITIONER

## 2018-08-07 PROCEDURE — G8417 CALC BMI ABV UP PARAM F/U: HCPCS | Performed by: NURSE PRACTITIONER

## 2018-08-07 PROCEDURE — 1036F TOBACCO NON-USER: CPT | Performed by: NURSE PRACTITIONER

## 2018-08-07 PROCEDURE — 99214 OFFICE O/P EST MOD 30 MIN: CPT | Performed by: NURSE PRACTITIONER

## 2018-08-07 PROCEDURE — 4040F PNEUMOC VAC/ADMIN/RCVD: CPT | Performed by: NURSE PRACTITIONER

## 2018-08-07 PROCEDURE — 83036 HEMOGLOBIN GLYCOSYLATED A1C: CPT | Performed by: NURSE PRACTITIONER

## 2018-08-07 RX ORDER — LOSARTAN POTASSIUM 50 MG/1
50 TABLET ORAL DAILY
Qty: 90 TABLET | Refills: 1 | Status: SHIPPED | OUTPATIENT
Start: 2018-08-07 | End: 2018-08-28 | Stop reason: SDUPTHER

## 2018-08-07 RX ORDER — GLIPIZIDE 10 MG/1
10 TABLET, FILM COATED, EXTENDED RELEASE ORAL 2 TIMES DAILY WITH MEALS
Qty: 180 TABLET | Refills: 1 | Status: SHIPPED | OUTPATIENT
Start: 2018-08-07 | End: 2019-02-13 | Stop reason: SDUPTHER

## 2018-08-07 RX ORDER — LEVOTHYROXINE SODIUM 0.1 MG/1
100 TABLET ORAL DAILY
Qty: 90 TABLET | Refills: 1 | Status: SHIPPED | OUTPATIENT
Start: 2018-08-07 | End: 2019-02-14 | Stop reason: SDUPTHER

## 2018-08-07 RX ORDER — HYDROCHLOROTHIAZIDE 25 MG/1
25 TABLET ORAL DAILY
Qty: 90 TABLET | Refills: 1 | Status: SHIPPED | OUTPATIENT
Start: 2018-08-07 | End: 2018-08-28 | Stop reason: SDUPTHER

## 2018-08-07 ASSESSMENT — ENCOUNTER SYMPTOMS
SHORTNESS OF BREATH: 0
ORTHOPNEA: 0
BLURRED VISION: 0

## 2018-08-07 NOTE — PROGRESS NOTES
[facility] PHYSICIAN PRACTICES  [facility] FAMILY MEDICINE  [address] [place] [id number]  Dept: [phone]  Dept Fax: [phone]    Vamshi Mauricio is a 69 y.o. female who presents today for her medical conditions/complaints as noted below. Vamshi Mauricio is c/o of Diabetes (follow up)        HPI:     Chief Complaint   Patient presents with    Diabetes     follow up       Diabetes   She presents for her follow-up diabetic visit. She has type 2 diabetes mellitus. Her disease course has been stable. There are no hypoglycemic associated symptoms. Pertinent negatives for hypoglycemia include no headaches or sweats. There are no diabetic associated symptoms. Pertinent negatives for diabetes include no blurred vision and no chest pain. There are no hypoglycemic complications. Symptoms are stable. There are no diabetic complications. Pertinent negatives for diabetic complications include no CVA, PVD or retinopathy. Risk factors for coronary artery disease include diabetes mellitus, dyslipidemia, family history, obesity, hypertension, post-menopausal, sedentary lifestyle, stress and tobacco exposure. Current diabetic treatment includes oral agent (monotherapy). She is compliant with treatment most of the time. Her weight is stable. She is following a generally healthy diet. Meal planning includes avoidance of concentrated sweets. She has not had a previous visit with a dietitian. She participates in exercise daily. There is no change in her home blood glucose trend. An ACE inhibitor/angiotensin II receptor blocker is being taken. She does not see a podiatrist.Eye exam is current. Hypertension   This is a chronic problem. The current episode started more than 1 year ago. The problem is unchanged. The problem is controlled. Pertinent negatives include no anxiety, blurred vision, chest pain, headaches, malaise/fatigue, neck pain, orthopnea, palpitations, peripheral edema, PND, shortness of breath or sweats. Risk factors for coronary artery disease include diabetes mellitus, dyslipidemia, family history, male gender, obesity, post-menopausal state, sedentary lifestyle, smoking/tobacco exposure and stress. Past treatments include angiotensin blockers. The current treatment provides moderate improvement. Compliance problems include diet and exercise. There is no history of angina, kidney disease, CAD/MI, CVA, heart failure, left ventricular hypertrophy, PVD or retinopathy. Identifiable causes of hypertension include a thyroid problem. There is no history of chronic renal disease, coarctation of the aorta, hyperaldosteronism, hypercortisolism, hyperparathyroidism, a hypertension causing med, pheochromocytoma, renovascular disease or sleep apnea. Hypothyroidism  Patient complains of hypothyroidism. Current symptoms: none. Patient denies change in energy level, diarrhea, heat / cold intolerance, nervousness, palpitations and weight changes. Onset of symptoms was several years ago. Symptoms have stabilized. HLD- Patient is trying to improve her diet. She refuses any cholesterol medications. Patient states that she is feeling great. She states she feels the best that she's had in several years. Should her cataract surgery went well and she has 20/20 vision now.     Past Medical History:   Diagnosis Date    Hypertension     Hypothyroidism     Mixed hyperlipidemia     Thyrotoxicosis     Type 2 diabetes mellitus without complication (HCC)     Type II or unspecified type diabetes mellitus without mention of complication, not stated as uncontrolled       Past Surgical History:   Procedure Laterality Date    ANKLE FRACTURE SURGERY Right 2007    CARPAL TUNNEL RELEASE Left 1998    CATARACT REMOVAL WITH IMPLANT Right 03/01/2018     PHACO EMULSIFICATION OF CATARACT WITH INTRA OCULAR LENS IMPLANT RIGHT EYE    CATARACT REMOVAL WITH IMPLANT Left 06/14/2018    HYSTERECTOMY      KNEE ARTHROSCOPY Right 2015 vitals reviewed. Hospital Outpatient Visit on 06/14/2018   Component Date Value Ref Range Status    POC Glucose 06/14/2018 107* 70 - 99 mg/dl Final    Performed on 06/14/2018 ACCU-CHEK   Final           Assessment & Plan: The following diagnoses and conditions are stable with no further orders unless indicated:  1. Type 2 diabetes mellitus without complication, without long-term current use of insulin ([address]    2. Essential hypertension    3. Other specified hypothyroidism    4. Vitamin D deficiency    5. Mixed hyperlipidemia        Margaux Cerda was seen today for diabetes. Patient's A1c is elevated from 7.7-8.1. Do not believe increasing patient's medications at this time is beneficial as she has severe osteoporosis arthritis in her bilateral knees increasing her risk for falls if she was to become hypoglycemic. Patient also has a allergy to metformin and therefore metformin cannot be utilized. Patient is not wanting to start any further medications for her diabetes at this time either. Hypertension stable at this time. Patient may continue taking her Hydrocort thousand and her losartan. Hypothyroidism symptoms seem stable at this time. No changes in medications at this time. Patient continues to refuse medications for her hyperlipidemia and her elevated ASCVD score. Educated patient on the benefits of taking atorvastatin and what could happen if she does not take the atorvastatin and she continues to refuse taking any form of a statin or any cholesterol medication. Diagnoses and all orders for this visit:    Type 2 diabetes mellitus without complication, without long-term current use of insulin (HCC)  -     POCT glycosylated hemoglobin (Hb A1C)  -     glipiZIDE (GLUCOTROL XL) 10 MG extended release tablet; Take 1 tablet by mouth 2 times daily (with meals)  -     CBC Auto Differential; Future  -     Comprehensive Metabolic Panel; Future  -     Hemoglobin A1C; Future  -     Magnesium;  Future  -

## 2018-08-28 DIAGNOSIS — I10 ESSENTIAL HYPERTENSION: ICD-10-CM

## 2018-08-28 RX ORDER — LOSARTAN POTASSIUM 50 MG/1
50 TABLET ORAL DAILY
Qty: 90 TABLET | Refills: 1 | Status: SHIPPED | OUTPATIENT
Start: 2018-08-28 | End: 2019-02-13 | Stop reason: ALTCHOICE

## 2018-08-28 RX ORDER — HYDROCHLOROTHIAZIDE 25 MG/1
25 TABLET ORAL DAILY
Qty: 90 TABLET | Refills: 1 | Status: SHIPPED | OUTPATIENT
Start: 2018-08-28 | End: 2019-02-13 | Stop reason: SDUPTHER

## 2019-02-13 ENCOUNTER — OFFICE VISIT (OUTPATIENT)
Dept: FAMILY MEDICINE CLINIC | Age: 79
End: 2019-02-13
Payer: MEDICARE

## 2019-02-13 VITALS
SYSTOLIC BLOOD PRESSURE: 144 MMHG | OXYGEN SATURATION: 98 % | WEIGHT: 198 LBS | HEART RATE: 72 BPM | DIASTOLIC BLOOD PRESSURE: 78 MMHG | BODY MASS INDEX: 31.08 KG/M2 | HEIGHT: 67 IN

## 2019-02-13 DIAGNOSIS — I10 ESSENTIAL HYPERTENSION: ICD-10-CM

## 2019-02-13 DIAGNOSIS — E03.8 OTHER SPECIFIED HYPOTHYROIDISM: ICD-10-CM

## 2019-02-13 DIAGNOSIS — E11.65 TYPE 2 DIABETES MELLITUS WITH HYPERGLYCEMIA, WITHOUT LONG-TERM CURRENT USE OF INSULIN (HCC): ICD-10-CM

## 2019-02-13 DIAGNOSIS — Z13.21 ENCOUNTER FOR VITAMIN DEFICIENCY SCREENING: ICD-10-CM

## 2019-02-13 DIAGNOSIS — M89.9 BONE DISORDER: ICD-10-CM

## 2019-02-13 DIAGNOSIS — E11.9 TYPE 2 DIABETES MELLITUS WITHOUT COMPLICATION, WITHOUT LONG-TERM CURRENT USE OF INSULIN (HCC): Primary | ICD-10-CM

## 2019-02-13 LAB
BASOPHILS ABSOLUTE: 0.1 K/UL (ref 0–0.2)
BASOPHILS RELATIVE PERCENT: 1.2 %
EOSINOPHILS ABSOLUTE: 0.1 K/UL (ref 0–0.6)
EOSINOPHILS RELATIVE PERCENT: 1.3 %
HBA1C MFR BLD: 9 %
HCT VFR BLD CALC: 35.2 % (ref 36–48)
HEMOGLOBIN: 11.3 G/DL (ref 12–16)
LYMPHOCYTES ABSOLUTE: 1.7 K/UL (ref 1–5.1)
LYMPHOCYTES RELATIVE PERCENT: 28.8 %
MCH RBC QN AUTO: 29.3 PG (ref 26–34)
MCHC RBC AUTO-ENTMCNC: 32.1 G/DL (ref 31–36)
MCV RBC AUTO: 91 FL (ref 80–100)
MONOCYTES ABSOLUTE: 0.5 K/UL (ref 0–1.3)
MONOCYTES RELATIVE PERCENT: 8.1 %
NEUTROPHILS ABSOLUTE: 3.6 K/UL (ref 1.7–7.7)
NEUTROPHILS RELATIVE PERCENT: 60.6 %
PDW BLD-RTO: 14.2 % (ref 12.4–15.4)
PLATELET # BLD: 395 K/UL (ref 135–450)
PMV BLD AUTO: 8.1 FL (ref 5–10.5)
RBC # BLD: 3.87 M/UL (ref 4–5.2)
WBC # BLD: 5.9 K/UL (ref 4–11)

## 2019-02-13 PROCEDURE — G8427 DOCREV CUR MEDS BY ELIG CLIN: HCPCS | Performed by: NURSE PRACTITIONER

## 2019-02-13 PROCEDURE — 1090F PRES/ABSN URINE INCON ASSESS: CPT | Performed by: NURSE PRACTITIONER

## 2019-02-13 PROCEDURE — 1101F PT FALLS ASSESS-DOCD LE1/YR: CPT | Performed by: NURSE PRACTITIONER

## 2019-02-13 PROCEDURE — 1123F ACP DISCUSS/DSCN MKR DOCD: CPT | Performed by: NURSE PRACTITIONER

## 2019-02-13 PROCEDURE — G8417 CALC BMI ABV UP PARAM F/U: HCPCS | Performed by: NURSE PRACTITIONER

## 2019-02-13 PROCEDURE — G8482 FLU IMMUNIZE ORDER/ADMIN: HCPCS | Performed by: NURSE PRACTITIONER

## 2019-02-13 PROCEDURE — 4040F PNEUMOC VAC/ADMIN/RCVD: CPT | Performed by: NURSE PRACTITIONER

## 2019-02-13 PROCEDURE — 36415 COLL VENOUS BLD VENIPUNCTURE: CPT | Performed by: NURSE PRACTITIONER

## 2019-02-13 PROCEDURE — 1036F TOBACCO NON-USER: CPT | Performed by: NURSE PRACTITIONER

## 2019-02-13 PROCEDURE — 99214 OFFICE O/P EST MOD 30 MIN: CPT | Performed by: NURSE PRACTITIONER

## 2019-02-13 PROCEDURE — 83036 HEMOGLOBIN GLYCOSYLATED A1C: CPT | Performed by: NURSE PRACTITIONER

## 2019-02-13 PROCEDURE — G8400 PT W/DXA NO RESULTS DOC: HCPCS | Performed by: NURSE PRACTITIONER

## 2019-02-13 RX ORDER — HYDROCHLOROTHIAZIDE 25 MG/1
25 TABLET ORAL DAILY
Qty: 90 TABLET | Refills: 1 | Status: SHIPPED | OUTPATIENT
Start: 2019-02-13 | End: 2019-08-15 | Stop reason: SDUPTHER

## 2019-02-13 RX ORDER — GLIPIZIDE 10 MG/1
10 TABLET, FILM COATED, EXTENDED RELEASE ORAL 2 TIMES DAILY WITH MEALS
Qty: 180 TABLET | Refills: 1 | Status: SHIPPED | OUTPATIENT
Start: 2019-02-13 | End: 2019-08-15 | Stop reason: SDUPTHER

## 2019-02-13 RX ORDER — OLMESARTAN MEDOXOMIL 20 MG/1
20 TABLET ORAL DAILY
Qty: 90 TABLET | Refills: 1 | Status: SHIPPED | OUTPATIENT
Start: 2019-02-13 | End: 2019-05-13 | Stop reason: ALTCHOICE

## 2019-02-13 RX ORDER — LEVOTHYROXINE SODIUM 0.1 MG/1
100 TABLET ORAL DAILY
Qty: 90 TABLET | Refills: 1 | Status: CANCELLED | OUTPATIENT
Start: 2019-02-13

## 2019-02-13 ASSESSMENT — PATIENT HEALTH QUESTIONNAIRE - PHQ9
SUM OF ALL RESPONSES TO PHQ9 QUESTIONS 1 & 2: 0
SUM OF ALL RESPONSES TO PHQ QUESTIONS 1-9: 0
1. LITTLE INTEREST OR PLEASURE IN DOING THINGS: 0
2. FEELING DOWN, DEPRESSED OR HOPELESS: 0
SUM OF ALL RESPONSES TO PHQ QUESTIONS 1-9: 0

## 2019-02-13 ASSESSMENT — ENCOUNTER SYMPTOMS
SINUS PRESSURE: 0
ALLERGIC/IMMUNOLOGIC NEGATIVE: 1
SHORTNESS OF BREATH: 0
COUGH: 0
RHINORRHEA: 0
EYE DISCHARGE: 0
BACK PAIN: 0
DIARRHEA: 0
CONSTIPATION: 0
COLOR CHANGE: 0
BLOOD IN STOOL: 0
STRIDOR: 0
APNEA: 0
GASTROINTESTINAL NEGATIVE: 1
CHOKING: 0
ABDOMINAL PAIN: 0
EYE PAIN: 0
EYE REDNESS: 0
WHEEZING: 0
PHOTOPHOBIA: 0
CHEST TIGHTNESS: 0
SORE THROAT: 0
EYE ITCHING: 0
TROUBLE SWALLOWING: 0
RESPIRATORY NEGATIVE: 1
NAUSEA: 0
VOMITING: 0

## 2019-02-14 ENCOUNTER — TELEPHONE (OUTPATIENT)
Dept: FAMILY MEDICINE CLINIC | Age: 79
End: 2019-02-14

## 2019-02-14 DIAGNOSIS — E03.8 OTHER SPECIFIED HYPOTHYROIDISM: ICD-10-CM

## 2019-02-14 DIAGNOSIS — D64.9 ANEMIA, UNSPECIFIED TYPE: Primary | ICD-10-CM

## 2019-02-14 DIAGNOSIS — I10 ESSENTIAL HYPERTENSION: Primary | ICD-10-CM

## 2019-02-14 DIAGNOSIS — D64.9 ANEMIA, UNSPECIFIED TYPE: ICD-10-CM

## 2019-02-14 DIAGNOSIS — E55.9 VITAMIN D DEFICIENCY: Primary | ICD-10-CM

## 2019-02-14 LAB
A/G RATIO: 1.4 (ref 1.1–2.2)
ALBUMIN SERPL-MCNC: 4.2 G/DL (ref 3.4–5)
ALP BLD-CCNC: 118 U/L (ref 40–129)
ALT SERPL-CCNC: 12 U/L (ref 10–40)
ANION GAP SERPL CALCULATED.3IONS-SCNC: 14 MMOL/L (ref 3–16)
AST SERPL-CCNC: 19 U/L (ref 15–37)
BILIRUB SERPL-MCNC: 0.4 MG/DL (ref 0–1)
BUN BLDV-MCNC: 15 MG/DL (ref 7–20)
CALCIUM SERPL-MCNC: 9.2 MG/DL (ref 8.3–10.6)
CHLORIDE BLD-SCNC: 100 MMOL/L (ref 99–110)
CO2: 25 MMOL/L (ref 21–32)
CREAT SERPL-MCNC: 1 MG/DL (ref 0.6–1.2)
FERRITIN: 20.1 NG/ML (ref 15–150)
FOLATE: 12.48 NG/ML (ref 4.78–24.2)
GFR AFRICAN AMERICAN: >60
GFR NON-AFRICAN AMERICAN: 54
GLOBULIN: 3 G/DL
GLUCOSE BLD-MCNC: 157 MG/DL (ref 70–99)
IRON SATURATION: 8 % (ref 15–50)
IRON: 30 UG/DL (ref 37–145)
POTASSIUM SERPL-SCNC: 3.8 MMOL/L (ref 3.5–5.1)
SODIUM BLD-SCNC: 139 MMOL/L (ref 136–145)
TOTAL IRON BINDING CAPACITY: 353 UG/DL (ref 260–445)
TOTAL PROTEIN: 7.2 G/DL (ref 6.4–8.2)
TSH REFLEX: 0.8 UIU/ML (ref 0.27–4.2)
VITAMIN B-12: 351 PG/ML (ref 211–911)
VITAMIN D 25-HYDROXY: 20.3 NG/ML

## 2019-02-14 RX ORDER — VALSARTAN 160 MG/1
160 TABLET ORAL DAILY
Qty: 90 TABLET | Refills: 1 | Status: SHIPPED | OUTPATIENT
Start: 2019-02-14 | End: 2019-05-13 | Stop reason: ALTCHOICE

## 2019-02-14 RX ORDER — LEVOTHYROXINE SODIUM 0.1 MG/1
TABLET ORAL
Qty: 90 TABLET | Refills: 1 | Status: SHIPPED | OUTPATIENT
Start: 2019-02-14 | End: 2019-08-16 | Stop reason: SDUPTHER

## 2019-02-15 PROBLEM — D50.9 IRON DEFICIENCY ANEMIA: Status: ACTIVE | Noted: 2019-02-15

## 2019-05-13 ENCOUNTER — OFFICE VISIT (OUTPATIENT)
Dept: FAMILY MEDICINE CLINIC | Age: 79
End: 2019-05-13
Payer: MEDICARE

## 2019-05-13 VITALS
HEART RATE: 79 BPM | OXYGEN SATURATION: 97 % | SYSTOLIC BLOOD PRESSURE: 146 MMHG | WEIGHT: 200 LBS | HEIGHT: 67 IN | DIASTOLIC BLOOD PRESSURE: 74 MMHG | BODY MASS INDEX: 31.39 KG/M2

## 2019-05-13 DIAGNOSIS — E55.9 VITAMIN D DEFICIENCY: ICD-10-CM

## 2019-05-13 DIAGNOSIS — I10 ESSENTIAL HYPERTENSION: ICD-10-CM

## 2019-05-13 DIAGNOSIS — E03.9 HYPOTHYROIDISM, UNSPECIFIED TYPE: ICD-10-CM

## 2019-05-13 DIAGNOSIS — D64.9 ANEMIA, UNSPECIFIED TYPE: ICD-10-CM

## 2019-05-13 DIAGNOSIS — J34.89 SINUS DRAINAGE: ICD-10-CM

## 2019-05-13 DIAGNOSIS — E11.9 TYPE 2 DIABETES MELLITUS WITHOUT COMPLICATION, WITHOUT LONG-TERM CURRENT USE OF INSULIN (HCC): Primary | ICD-10-CM

## 2019-05-13 LAB — HBA1C MFR BLD: 8.5 %

## 2019-05-13 PROCEDURE — 1123F ACP DISCUSS/DSCN MKR DOCD: CPT | Performed by: NURSE PRACTITIONER

## 2019-05-13 PROCEDURE — 1036F TOBACCO NON-USER: CPT | Performed by: NURSE PRACTITIONER

## 2019-05-13 PROCEDURE — 4040F PNEUMOC VAC/ADMIN/RCVD: CPT | Performed by: NURSE PRACTITIONER

## 2019-05-13 PROCEDURE — G8427 DOCREV CUR MEDS BY ELIG CLIN: HCPCS | Performed by: NURSE PRACTITIONER

## 2019-05-13 PROCEDURE — 99214 OFFICE O/P EST MOD 30 MIN: CPT | Performed by: NURSE PRACTITIONER

## 2019-05-13 PROCEDURE — G8400 PT W/DXA NO RESULTS DOC: HCPCS | Performed by: NURSE PRACTITIONER

## 2019-05-13 PROCEDURE — 83036 HEMOGLOBIN GLYCOSYLATED A1C: CPT | Performed by: NURSE PRACTITIONER

## 2019-05-13 PROCEDURE — 1090F PRES/ABSN URINE INCON ASSESS: CPT | Performed by: NURSE PRACTITIONER

## 2019-05-13 PROCEDURE — G8417 CALC BMI ABV UP PARAM F/U: HCPCS | Performed by: NURSE PRACTITIONER

## 2019-05-13 RX ORDER — OLMESARTAN MEDOXOMIL 5 MG/1
5 TABLET ORAL DAILY
Qty: 90 TABLET | Refills: 1 | Status: SHIPPED | OUTPATIENT
Start: 2019-05-13 | End: 2019-11-08 | Stop reason: SDUPTHER

## 2019-05-13 RX ORDER — FLUTICASONE PROPIONATE 50 MCG
1 SPRAY, SUSPENSION (ML) NASAL DAILY
Qty: 1 BOTTLE | Refills: 3 | COMMUNITY
Start: 2019-05-13 | End: 2019-09-16 | Stop reason: ALTCHOICE

## 2019-05-13 NOTE — PROGRESS NOTES
[address] [facility]  [address] [facility] [address]  Dept: [phone]  Dept Fax: [phone]  Loc: Nolan Bradshaw is a 69 y.o. female who presents today for her medical conditions/complaints as noted below. Vianey Hedrick is c/o of Hypertension (pt has missed a lot of medication lately but at times when she does take her bp medication she feels light headed and after checking bp it was 100/60. pt states she has not felt right after starting the diovan. ); Diabetes (pt does not check sugar at home. pt has missed a lot medication lately  due to stress at home with her son. pt does not watch her diet but knows what she should eat. ); and Hypothyroidism (pt has taken her medication. pttakes her medication every day onempty stomach. )        HPI:     Chief Complaint   Patient presents with    Hypertension     pt has missed a lot of medication lately but at times when she does take her bp medication she feels light headed and after checking bp it was 100/60. pt states she has not felt right after starting the diovan.  Diabetes     pt does not check sugar at home. pt has missed a lot medication lately  due to stress at home with her son. pt does not watch her diet but knows what she should eat.  Hypothyroidism     pt has taken her medication. pttakes her medication every day onempty stomach. HPI    Patient is here to follow up on hypothyroidism. Taking medication as prescribed. Denies any palpitations, diarrhea, tremors, constipation, increased fatigue. Denies any side effect from the medication. Patient is here for follow up on diabetes. Taking medication as prescribed when she remembers. She forgets her medication frequently. Denies any hypoglycemia episodes. Denies any increased urination, hunger, or thirst.  Trying to eat a healthy, diabetic diet, but with stress at home, she often does not follow her diet frequently. Patient is here today to follow up on hypertension. Taking medications as prescribed when she remembers. Is trying to adhere to a no salt diet. Denies any chest pain, leg swelling, orthopnea, dizziness. She states she feels lightheaded when she takes Diovan and has noticed her blood pressure being 100/60. Patient is here to follow up on vitamin D deficiency. Patient is taking vitamin D supplement without any adverse effects as prescribed. Loyda Agustin was informed from her labs on 2/13 that she had iron deficiency anemia. She was instructed to take OTC iron supplement. She did not start an iron supplement yet. Loyda Agustin admits to having sinus congest and sinus drainage. She is having headaches and left ear pressure. She cannot breath out of her nose well.         Past Medical History:   Diagnosis Date    Hypertension     Hypothyroidism     Mixed hyperlipidemia     Thyrotoxicosis     Type 2 diabetes mellitus without complication (HCC)     Type II or unspecified type diabetes mellitus without mention of complication, not stated as uncontrolled       Past Surgical History:   Procedure Laterality Date    ANKLE FRACTURE SURGERY Right 2007    CARPAL TUNNEL RELEASE Left 1998    CATARACT REMOVAL WITH IMPLANT Right 03/01/2018     PHACO EMULSIFICATION OF CATARACT WITH INTRA OCULAR LENS IMPLANT RIGHT EYE    CATARACT REMOVAL WITH IMPLANT Left 06/14/2018    HYSTERECTOMY      KNEE ARTHROSCOPY Right 2015       Family History   Problem Relation Age of Onset    Diabetes Sister     Heart Disease Sister     High Blood Pressure Sister     Diabetes Brother        Social History     Tobacco Use    Smoking status: Never Smoker    Smokeless tobacco: Never Used   Substance Use Topics    Alcohol use: No         Current Outpatient Medications   Medication Sig Dispense Refill    olmesartan (BENICAR) 5 MG tablet Take 1 tablet by mouth daily 90 tablet 1    fluticasone (FLONASE) 50 MCG/ACT nasal spray 1 spray by Conjunctivae and EOM are normal. Right eye exhibits no discharge. Left eye exhibits no discharge. No scleral icterus. Neck: Normal range of motion. Neck supple. No tracheal deviation present. Cardiovascular: Normal rate, regular rhythm and intact distal pulses. Exam reveals no gallop and no friction rub. Murmur heard. Systolic murmur is present with a grade of 2/6. Pulmonary/Chest: Effort normal and breath sounds normal. No stridor. No respiratory distress. She has no wheezes. She has no rales. She exhibits no tenderness. Abdominal: Soft. Bowel sounds are normal. She exhibits no distension and no mass. There is no tenderness. There is no rebound and no guarding. No hernia. Musculoskeletal: Normal range of motion. She exhibits no edema, tenderness or deformity. Lymphadenopathy:     She has no cervical adenopathy. Neurological: She is alert and oriented to person, place, and time. She has normal reflexes. She displays normal reflexes. No cranial nerve deficit. She exhibits normal muscle tone. Coordination normal.   Skin: Skin is warm and dry. Capillary refill takes less than 2 seconds. No rash noted. She is not diaphoretic. No erythema. No pallor. Psychiatric: She has a normal mood and affect. Her behavior is normal. Judgment and thought content normal.   Nursing note and vitals reviewed. Orders Only on 02/14/2019   Component Date Value Ref Range Status    Ferritin 02/13/2019 20.1  15.0 - 150.0 ng/mL Final    Iron 02/13/2019 30* 37 - 145 ug/dL Final    TIBC 02/13/2019 353  260 - 445 ug/dL Final    Iron Saturation 02/13/2019 8* 15 - 50 % Final           Assessment & Plan: The following diagnoses and conditions are stable with no further orders unless indicated:  1. Type 2 diabetes mellitus without complication, without long-term current use of insulin ([address]    2. Hypothyroidism, unspecified type    3. Essential hypertension    4. Vitamin D deficiency    5. Anemia, unspecified type    6. CNP   glipiZIDE (GLUCOTROL XL) 10 MG extended release tablet Take 1 tablet by mouth 2 times daily (with meals) Yes PILY Le - CNP   aspirin 81 MG tablet Take 81 mg by mouth Yes Historical Provider, MD        Orders Placed This Encounter   Medications    olmesartan (BENICAR) 5 MG tablet     Sig: Take 1 tablet by mouth daily     Dispense:  90 tablet     Refill:  1    fluticasone (FLONASE) 50 MCG/ACT nasal spray     Si spray by Nasal route daily     Dispense:  1 Bottle     Refill:  3         Return in about 4 months (around 2019), or if symptoms worsen or fail to improve, for DM, HTN, HLD, iron deficiency, vitamin D - will need blood work . Patient should call the office immediately with new or ongoing signs or symptoms or worsening, or proceedto the emergency room. No changes in past medical history, past surgical history, social history, or family history were noted during the patient encounter unless specifically listed above. All updates of past medicalhistory, past surgical history, social history, or family history were reviewed personally by me during the office visit. All problems listed in the assessment are stable unless noted otherwise. Medication profilereviewed personally by me during the office visit. Medication side effects and possible impairments from medications were discussed as applicable.     Call if pattern of symptoms change or persists for an extended time. This document was prepared by a combination of typing and transcription through a voice recognition software.

## 2019-05-13 NOTE — PATIENT INSTRUCTIONS
Stop Diovan and start Benicar. Call with BP results in 2 weeks. Start Flonase and sinus rinse. Try a vitamin with iron for iron deficiency - make sure you take this with food.

## 2019-08-15 DIAGNOSIS — I10 ESSENTIAL HYPERTENSION: ICD-10-CM

## 2019-08-15 DIAGNOSIS — E11.9 TYPE 2 DIABETES MELLITUS WITHOUT COMPLICATION, WITHOUT LONG-TERM CURRENT USE OF INSULIN (HCC): ICD-10-CM

## 2019-08-15 RX ORDER — GLIPIZIDE 10 MG/1
TABLET, FILM COATED, EXTENDED RELEASE ORAL
Qty: 180 TABLET | Refills: 1 | Status: SHIPPED | OUTPATIENT
Start: 2019-08-15 | End: 2020-02-13

## 2019-08-15 RX ORDER — HYDROCHLOROTHIAZIDE 25 MG/1
TABLET ORAL
Qty: 90 TABLET | Refills: 1 | Status: SHIPPED | OUTPATIENT
Start: 2019-08-15 | End: 2020-02-13

## 2019-08-16 DIAGNOSIS — E03.8 OTHER SPECIFIED HYPOTHYROIDISM: ICD-10-CM

## 2019-08-16 RX ORDER — LEVOTHYROXINE SODIUM 0.1 MG/1
100 TABLET ORAL DAILY
Qty: 90 TABLET | Refills: 3 | Status: SHIPPED | OUTPATIENT
Start: 2019-08-16 | End: 2020-05-28 | Stop reason: SDUPTHER

## 2019-09-16 ENCOUNTER — OFFICE VISIT (OUTPATIENT)
Dept: FAMILY MEDICINE CLINIC | Age: 79
End: 2019-09-16
Payer: MEDICARE

## 2019-09-16 VITALS
HEART RATE: 78 BPM | HEIGHT: 67 IN | BODY MASS INDEX: 31.23 KG/M2 | SYSTOLIC BLOOD PRESSURE: 124 MMHG | OXYGEN SATURATION: 95 % | WEIGHT: 199 LBS | DIASTOLIC BLOOD PRESSURE: 78 MMHG

## 2019-09-16 DIAGNOSIS — Z13.820 SCREENING FOR OSTEOPOROSIS: ICD-10-CM

## 2019-09-16 DIAGNOSIS — E55.9 VITAMIN D DEFICIENCY: ICD-10-CM

## 2019-09-16 DIAGNOSIS — E03.9 HYPOTHYROIDISM, UNSPECIFIED TYPE: Primary | ICD-10-CM

## 2019-09-16 DIAGNOSIS — D50.9 IRON DEFICIENCY ANEMIA, UNSPECIFIED IRON DEFICIENCY ANEMIA TYPE: ICD-10-CM

## 2019-09-16 DIAGNOSIS — R01.1 HEART MURMUR ON PHYSICAL EXAMINATION: ICD-10-CM

## 2019-09-16 DIAGNOSIS — I10 ESSENTIAL HYPERTENSION: ICD-10-CM

## 2019-09-16 DIAGNOSIS — E11.9 TYPE 2 DIABETES MELLITUS WITHOUT COMPLICATION, WITHOUT LONG-TERM CURRENT USE OF INSULIN (HCC): ICD-10-CM

## 2019-09-16 DIAGNOSIS — Z78.0 POSTMENOPAUSAL: ICD-10-CM

## 2019-09-16 LAB
BASOPHILS ABSOLUTE: 0.1 K/UL (ref 0–0.2)
BASOPHILS RELATIVE PERCENT: 1 %
EOSINOPHILS ABSOLUTE: 0.1 K/UL (ref 0–0.6)
EOSINOPHILS RELATIVE PERCENT: 1.1 %
HBA1C MFR BLD: 8.1 %
HCT VFR BLD CALC: 37.5 % (ref 36–48)
HEMOGLOBIN: 12.5 G/DL (ref 12–16)
LYMPHOCYTES ABSOLUTE: 1.5 K/UL (ref 1–5.1)
LYMPHOCYTES RELATIVE PERCENT: 18.9 %
MCH RBC QN AUTO: 31.8 PG (ref 26–34)
MCHC RBC AUTO-ENTMCNC: 33.3 G/DL (ref 31–36)
MCV RBC AUTO: 95.4 FL (ref 80–100)
MONOCYTES ABSOLUTE: 0.5 K/UL (ref 0–1.3)
MONOCYTES RELATIVE PERCENT: 7 %
NEUTROPHILS ABSOLUTE: 5.5 K/UL (ref 1.7–7.7)
NEUTROPHILS RELATIVE PERCENT: 72 %
PDW BLD-RTO: 13.9 % (ref 12.4–15.4)
PLATELET # BLD: 335 K/UL (ref 135–450)
PMV BLD AUTO: 7.8 FL (ref 5–10.5)
RBC # BLD: 3.94 M/UL (ref 4–5.2)
WBC # BLD: 7.7 K/UL (ref 4–11)

## 2019-09-16 PROCEDURE — 83036 HEMOGLOBIN GLYCOSYLATED A1C: CPT | Performed by: NURSE PRACTITIONER

## 2019-09-16 PROCEDURE — 1036F TOBACCO NON-USER: CPT | Performed by: NURSE PRACTITIONER

## 2019-09-16 PROCEDURE — G8400 PT W/DXA NO RESULTS DOC: HCPCS | Performed by: NURSE PRACTITIONER

## 2019-09-16 PROCEDURE — 36415 COLL VENOUS BLD VENIPUNCTURE: CPT | Performed by: NURSE PRACTITIONER

## 2019-09-16 PROCEDURE — 4040F PNEUMOC VAC/ADMIN/RCVD: CPT | Performed by: NURSE PRACTITIONER

## 2019-09-16 PROCEDURE — G8417 CALC BMI ABV UP PARAM F/U: HCPCS | Performed by: NURSE PRACTITIONER

## 2019-09-16 PROCEDURE — 1090F PRES/ABSN URINE INCON ASSESS: CPT | Performed by: NURSE PRACTITIONER

## 2019-09-16 PROCEDURE — G8427 DOCREV CUR MEDS BY ELIG CLIN: HCPCS | Performed by: NURSE PRACTITIONER

## 2019-09-16 PROCEDURE — 1123F ACP DISCUSS/DSCN MKR DOCD: CPT | Performed by: NURSE PRACTITIONER

## 2019-09-16 PROCEDURE — 99214 OFFICE O/P EST MOD 30 MIN: CPT | Performed by: NURSE PRACTITIONER

## 2019-09-16 ASSESSMENT — ENCOUNTER SYMPTOMS
ABDOMINAL DISTENTION: 0
DIARRHEA: 0
EYE DISCHARGE: 0
APNEA: 0
SHORTNESS OF BREATH: 0
SORE THROAT: 0
VOMITING: 0
RECTAL PAIN: 0
TROUBLE SWALLOWING: 0
EYE REDNESS: 0
BLOOD IN STOOL: 0
EYE ITCHING: 0
CHEST TIGHTNESS: 0
SINUS PRESSURE: 0
RHINORRHEA: 0
WHEEZING: 0
RESPIRATORY NEGATIVE: 1
BACK PAIN: 0
CONSTIPATION: 1
CHOKING: 0
NAUSEA: 0
EYE PAIN: 0
STRIDOR: 0
ANAL BLEEDING: 0
COUGH: 0
ABDOMINAL PAIN: 0
PHOTOPHOBIA: 0
COLOR CHANGE: 0

## 2019-09-16 NOTE — PATIENT INSTRUCTIONS
good, quick way to make sure that you have a balanced meal. It also helps you spread carbs throughout the day. ? Divide your plate by types of foods. Put non-starchy vegetables on half the plate, meat or other protein food on one-quarter of the plate, and a grain or starchy vegetable in the final quarter of the plate. To this you can add a small piece of fruit and 1 cup of milk or yogurt, depending on how many carbs you are supposed to eat at a meal.  · Try to eat about the same amount of carbs at each meal. Do not \"save up\" your daily allowance of carbs to eat at one meal.  · Proteins have very little or no carbs per serving. Examples of proteins are beef, chicken, turkey, fish, eggs, tofu, cheese, cottage cheese, and peanut butter. A serving size of meat is 3 ounces, which is about the size of a deck of cards. Examples of meat substitute serving sizes (equal to 1 ounce of meat) are 1/4 cup of cottage cheese, 1 egg, 1 tablespoon of peanut butter, and ½ cup of tofu. How can you eat out and still eat healthy? · Learn to estimate the serving sizes of foods that have carbohydrate. If you measure food at home, it will be easier to estimate the amount in a serving of restaurant food. · If the meal you order has too much carbohydrate (such as potatoes, corn, or baked beans), ask to have a low-carbohydrate food instead. Ask for a salad or green vegetables. · If you use insulin, check your blood sugar before and after eating out to help you plan how much to eat in the future. · If you eat more carbohydrate at a meal than you had planned, take a walk or do other exercise. This will help lower your blood sugar. What else should you know? · Limit saturated fat, such as the fat from meat and dairy products. This is a healthy choice because people who have diabetes are at higher risk of heart disease. So choose lean cuts of meat and nonfat or low-fat dairy products.  Use olive or canola oil instead of butter or shortening when cooking. · Don't skip meals. Your blood sugar may drop too low if you skip meals and take insulin or certain medicines for diabetes. · Check with your doctor before you drink alcohol. Alcohol can cause your blood sugar to drop too low. Alcohol can also cause a bad reaction if you take certain diabetes medicines. Follow-up care is a key part of your treatment and safety. Be sure to make and go to all appointments, and call your doctor if you are having problems. It's also a good idea to know your test results and keep a list of the medicines you take. Where can you learn more? Go to https://Guides.copepiceweb.Posterous. org and sign in to your "[employer] account. Enter Q158 in the [facility] box to learn more about \"Learning About Diabetes Food Guidelines. \"     If you do not have an account, please click on the \"Sign Up Now\" link. Current as of: July 25, 2018  Content Version: 12.1  © 0938-8438 [employer]. Care instructions adapted under license by [facility] ([facility]). If you have questions about a medical condition or this instruction, always ask your healthcare professional. [place] [address] any warranty or liability for your use of this information.

## 2019-09-16 NOTE — PROGRESS NOTES
[facility] PHYSICIAN PRACTICES  [address] [facility]  Dept: [phone]  Dept Fax: [phone]  Loc: [phone]    Axel Ng is a 67 y.o. female who presents today for her medical conditions/complaints as noted below. Axel Ng is c/o of Diabetes (pt does not check sugar at home. pt takes medication every day. pt feels she is feeling well. ); Hypothyroidism (pt takes medication every day on an empty stomach. pt denies any weight changes, skin issues or hair loss. ); and Hypertension (pt takes medication every day. pt does not check bp at home unless she feels bad which is rare. pt denies cp, sob, headaches, or edema. )        HPI:     Chief Complaint   Patient presents with    Diabetes     pt does not check sugar at home. pt takes medication every day. pt feels she is feeling well.  Hypothyroidism     pt takes medication every day on an empty stomach. pt denies any weight changes, skin issues or hair loss.  Hypertension     pt takes medication every day. pt does not check bp at home unless she feels bad which is rare. pt denies cp, sob, headaches, or edema. HPI    Patient is here for follow up on diabetes. Taking medication as prescribed majority of the time; however, she does not take her Glipizide two times daily and only takes it daily. Denies any hypoglycemia episodes. Denies any increased urination, hunger, or thirst.  Trying to eat a healthy, diabetic diet. Patient is here today to follow up on hypertension. Taking medications as prescribed. Is trying to adhere to a no salt diet. Denies any chest pain, leg swelling, orthopnea, dizziness. Patient is here to follow up on hypothyroidism. Taking medication as prescribed. Denies any palpitations, diarrhea, tremors, constipation, increased fatigue. Denies any side effect from the medication. She does not take a statin.   She declines to take a statin as it gives her disturbance and suicidal ideas. The patient is not nervous/anxious and is not hyperactive. Objective:     Vitals:    09/16/19 1303   BP: 124/78   Site: Right Upper Arm   Position: Sitting   Cuff Size: Medium Adult   Pulse: 78   SpO2: 95%   Weight: 199 lb (90.3 kg)   Height: 5' 7\" (1.702 m)     Wt Readings from Last 3 Encounters:   09/16/19 199 lb (90.3 kg)   05/13/19 200 lb (90.7 kg)   02/13/19 198 lb (89.8 kg)     Temp Readings from Last 3 Encounters:   08/07/18 98.2 °F (36.8 °C) (Oral)   06/14/18 98 °F (36.7 °C) (Temporal)   06/11/18 97 °F (36.1 °C) (Temporal)     BP Readings from Last 3 Encounters:   09/16/19 124/78   05/13/19 (!) 146/74   02/13/19 (!) 144/78     Pulse Readings from Last 3 Encounters:   09/16/19 78   05/13/19 79   02/13/19 72     Physical Exam   Constitutional: She is oriented to person, place, and time. She appears well-developed and well-nourished. No distress. Obese    HENT:   Head: Normocephalic and atraumatic. Right Ear: External ear normal.   Left Ear: External ear normal.   Nose: Nose normal.   Mouth/Throat: Oropharynx is clear and moist. No oropharyngeal exudate. Eyes: Conjunctivae and EOM are normal. Right eye exhibits no discharge. Left eye exhibits no discharge. No scleral icterus. Neck: Normal range of motion. Neck supple. No tracheal deviation present. Cardiovascular: Normal rate, regular rhythm and intact distal pulses. Exam reveals no gallop and no friction rub. Murmur heard. Systolic murmur is present with a grade of 2/6. Pulmonary/Chest: Effort normal and breath sounds normal. No stridor. No respiratory distress. She has no wheezes. She has no rales. She exhibits no tenderness. Abdominal: Soft. Bowel sounds are normal. She exhibits no distension and no mass. There is no tenderness. There is no rebound and no guarding. No hernia. Musculoskeletal: Normal range of motion. She exhibits no edema, tenderness or deformity.    Lymphadenopathy:     She has no

## 2019-09-17 PROBLEM — N18.30 CKD (CHRONIC KIDNEY DISEASE) STAGE 3, GFR 30-59 ML/MIN (HCC): Status: ACTIVE | Noted: 2019-09-17

## 2019-09-17 LAB
A/G RATIO: 1.8 (ref 1.1–2.2)
ALBUMIN SERPL-MCNC: 4.3 G/DL (ref 3.4–5)
ALP BLD-CCNC: 97 U/L (ref 40–129)
ALT SERPL-CCNC: 10 U/L (ref 10–40)
ANION GAP SERPL CALCULATED.3IONS-SCNC: 16 MMOL/L (ref 3–16)
AST SERPL-CCNC: 16 U/L (ref 15–37)
BILIRUB SERPL-MCNC: 0.3 MG/DL (ref 0–1)
BUN BLDV-MCNC: 19 MG/DL (ref 7–20)
CALCIUM SERPL-MCNC: 9.4 MG/DL (ref 8.3–10.6)
CHLORIDE BLD-SCNC: 102 MMOL/L (ref 99–110)
CO2: 23 MMOL/L (ref 21–32)
CREAT SERPL-MCNC: 1 MG/DL (ref 0.6–1.2)
FERRITIN: 70.5 NG/ML (ref 15–150)
FOLATE: 8.79 NG/ML (ref 4.78–24.2)
GFR AFRICAN AMERICAN: >60
GFR NON-AFRICAN AMERICAN: 54
GLOBULIN: 2.4 G/DL
GLUCOSE BLD-MCNC: 255 MG/DL (ref 70–99)
IRON SATURATION: 60 % (ref 15–50)
IRON: 183 UG/DL (ref 37–145)
POTASSIUM SERPL-SCNC: 4.4 MMOL/L (ref 3.5–5.1)
SODIUM BLD-SCNC: 141 MMOL/L (ref 136–145)
TOTAL IRON BINDING CAPACITY: 304 UG/DL (ref 260–445)
TOTAL PROTEIN: 6.7 G/DL (ref 6.4–8.2)
VITAMIN B-12: 627 PG/ML (ref 211–911)
VITAMIN D 25-HYDROXY: 23.8 NG/ML

## 2019-09-18 RX ORDER — ERGOCALCIFEROL 1.25 MG/1
50000 CAPSULE ORAL WEEKLY
Qty: 12 CAPSULE | Refills: 3 | Status: SHIPPED | OUTPATIENT
Start: 2019-09-18 | End: 2021-02-10 | Stop reason: SDUPTHER

## 2019-10-10 ENCOUNTER — NURSE ONLY (OUTPATIENT)
Dept: FAMILY MEDICINE CLINIC | Age: 79
End: 2019-10-10
Payer: MEDICARE

## 2019-10-10 DIAGNOSIS — Z23 NEED FOR INFLUENZA VACCINATION: Primary | ICD-10-CM

## 2019-10-10 PROCEDURE — 90686 IIV4 VACC NO PRSV 0.5 ML IM: CPT | Performed by: NURSE PRACTITIONER

## 2019-10-10 PROCEDURE — G0008 ADMIN INFLUENZA VIRUS VAC: HCPCS | Performed by: NURSE PRACTITIONER

## 2019-11-08 DIAGNOSIS — I10 ESSENTIAL HYPERTENSION: ICD-10-CM

## 2019-11-08 RX ORDER — OLMESARTAN MEDOXOMIL 5 MG/1
TABLET ORAL
Qty: 90 TABLET | Refills: 0 | Status: SHIPPED | OUTPATIENT
Start: 2019-11-08 | End: 2020-02-10

## 2020-02-10 RX ORDER — OLMESARTAN MEDOXOMIL 5 MG/1
TABLET ORAL
Qty: 90 TABLET | Refills: 1 | Status: SHIPPED | OUTPATIENT
Start: 2020-02-10 | End: 2020-05-28 | Stop reason: SDUPTHER

## 2020-02-13 RX ORDER — HYDROCHLOROTHIAZIDE 25 MG/1
TABLET ORAL
Qty: 90 TABLET | Refills: 1 | Status: SHIPPED | OUTPATIENT
Start: 2020-02-13 | End: 2020-05-28 | Stop reason: SDUPTHER

## 2020-02-13 RX ORDER — GLIPIZIDE 10 MG/1
TABLET, FILM COATED, EXTENDED RELEASE ORAL
Qty: 180 TABLET | Refills: 1 | Status: SHIPPED | OUTPATIENT
Start: 2020-02-13 | End: 2020-05-28 | Stop reason: SDUPTHER

## 2020-02-20 ENCOUNTER — APPOINTMENT (OUTPATIENT)
Dept: WOMENS IMAGING | Age: 80
End: 2020-02-20
Payer: MEDICARE

## 2020-02-20 ENCOUNTER — HOSPITAL ENCOUNTER (OUTPATIENT)
Dept: NON INVASIVE DIAGNOSTICS | Age: 80
Discharge: HOME | End: 2020-02-20
Payer: MEDICARE

## 2020-02-20 LAB
LV EF: 55 %
LVEF MODALITY: NORMAL

## 2020-02-20 PROCEDURE — 93306 TTE W/DOPPLER COMPLETE: CPT

## 2021-01-27 DIAGNOSIS — E11.9 TYPE 2 DIABETES MELLITUS WITHOUT COMPLICATION, WITHOUT LONG-TERM CURRENT USE OF INSULIN (HCC): ICD-10-CM

## 2021-01-27 DIAGNOSIS — E03.8 OTHER SPECIFIED HYPOTHYROIDISM: ICD-10-CM

## 2021-01-27 DIAGNOSIS — I10 ESSENTIAL HYPERTENSION: ICD-10-CM

## 2021-01-28 RX ORDER — GLIPIZIDE 10 MG/1
10 TABLET, FILM COATED, EXTENDED RELEASE ORAL 2 TIMES DAILY
Qty: 180 TABLET | Refills: 0 | Status: SHIPPED | OUTPATIENT
Start: 2021-01-28 | End: 2021-04-29

## 2021-01-28 RX ORDER — LEVOTHYROXINE SODIUM 0.1 MG/1
100 TABLET ORAL DAILY
Qty: 90 TABLET | Refills: 0 | Status: SHIPPED | OUTPATIENT
Start: 2021-01-28 | End: 2021-04-29

## 2021-01-28 RX ORDER — HYDROCHLOROTHIAZIDE 25 MG/1
25 TABLET ORAL DAILY
Qty: 90 TABLET | Refills: 0 | Status: SHIPPED | OUTPATIENT
Start: 2021-01-28 | End: 2021-04-29

## 2021-01-28 NOTE — TELEPHONE ENCOUNTER
Called to schedule an appt. Pt has son on a ventilator and unable to come in to office, she has no one else to stay with her son. Pt stated she would be willing to do a VV is PCP was okay with that. Please advise.

## 2021-01-28 NOTE — TELEPHONE ENCOUNTER
She has not been seen for almost two years. She needs blood work. Would recommend an in office appointment. Is she able to come in for labs and vitals and then able to do VV?

## 2021-02-10 ENCOUNTER — VIRTUAL VISIT (OUTPATIENT)
Dept: FAMILY MEDICINE CLINIC | Age: 81
End: 2021-02-10
Payer: MEDICARE

## 2021-02-10 DIAGNOSIS — N18.31 STAGE 3A CHRONIC KIDNEY DISEASE (HCC): ICD-10-CM

## 2021-02-10 DIAGNOSIS — E03.8 HYPOTHYROIDISM DUE TO HASHIMOTO'S THYROIDITIS: Primary | ICD-10-CM

## 2021-02-10 DIAGNOSIS — I10 ESSENTIAL HYPERTENSION: ICD-10-CM

## 2021-02-10 DIAGNOSIS — E06.3 HYPOTHYROIDISM DUE TO HASHIMOTO'S THYROIDITIS: Primary | ICD-10-CM

## 2021-02-10 DIAGNOSIS — E11.65 TYPE 2 DIABETES MELLITUS WITH HYPERGLYCEMIA, WITHOUT LONG-TERM CURRENT USE OF INSULIN (HCC): ICD-10-CM

## 2021-02-10 DIAGNOSIS — Z00.00 ROUTINE GENERAL MEDICAL EXAMINATION AT A HEALTH CARE FACILITY: ICD-10-CM

## 2021-02-10 DIAGNOSIS — E78.2 MIXED HYPERLIPIDEMIA: ICD-10-CM

## 2021-02-10 DIAGNOSIS — E55.9 VITAMIN D DEFICIENCY: ICD-10-CM

## 2021-02-10 PROCEDURE — 1036F TOBACCO NON-USER: CPT | Performed by: NURSE PRACTITIONER

## 2021-02-10 PROCEDURE — G8400 PT W/DXA NO RESULTS DOC: HCPCS | Performed by: NURSE PRACTITIONER

## 2021-02-10 PROCEDURE — 1090F PRES/ABSN URINE INCON ASSESS: CPT | Performed by: NURSE PRACTITIONER

## 2021-02-10 PROCEDURE — 1123F ACP DISCUSS/DSCN MKR DOCD: CPT | Performed by: NURSE PRACTITIONER

## 2021-02-10 PROCEDURE — G0439 PPPS, SUBSEQ VISIT: HCPCS | Performed by: NURSE PRACTITIONER

## 2021-02-10 PROCEDURE — 4040F PNEUMOC VAC/ADMIN/RCVD: CPT | Performed by: NURSE PRACTITIONER

## 2021-02-10 PROCEDURE — G8484 FLU IMMUNIZE NO ADMIN: HCPCS | Performed by: NURSE PRACTITIONER

## 2021-02-10 PROCEDURE — 99213 OFFICE O/P EST LOW 20 MIN: CPT | Performed by: NURSE PRACTITIONER

## 2021-02-10 PROCEDURE — G8421 BMI NOT CALCULATED: HCPCS | Performed by: NURSE PRACTITIONER

## 2021-02-10 PROCEDURE — G8427 DOCREV CUR MEDS BY ELIG CLIN: HCPCS | Performed by: NURSE PRACTITIONER

## 2021-02-10 ASSESSMENT — LIFESTYLE VARIABLES: HOW OFTEN DO YOU HAVE A DRINK CONTAINING ALCOHOL: 0

## 2021-02-10 ASSESSMENT — ENCOUNTER SYMPTOMS
BACK PAIN: 0
ANAL BLEEDING: 0
PHOTOPHOBIA: 0
APNEA: 0
SINUS PRESSURE: 0
SHORTNESS OF BREATH: 0
CHOKING: 0
EYE PAIN: 0
WHEEZING: 0
BLOOD IN STOOL: 0
RECTAL PAIN: 0
RESPIRATORY NEGATIVE: 1
ABDOMINAL PAIN: 0
ABDOMINAL DISTENTION: 0
STRIDOR: 0
VOMITING: 0
RHINORRHEA: 0
CONSTIPATION: 1
EYE DISCHARGE: 0
TROUBLE SWALLOWING: 0
COUGH: 0
EYE ITCHING: 0
ALLERGIC/IMMUNOLOGIC NEGATIVE: 1
EYE REDNESS: 0
CHEST TIGHTNESS: 0
COLOR CHANGE: 0
DIARRHEA: 0
SORE THROAT: 0
NAUSEA: 0

## 2021-02-10 ASSESSMENT — PATIENT HEALTH QUESTIONNAIRE - PHQ9
SUM OF ALL RESPONSES TO PHQ QUESTIONS 1-9: 0
SUM OF ALL RESPONSES TO PHQ QUESTIONS 1-9: 0
2. FEELING DOWN, DEPRESSED OR HOPELESS: 0

## 2021-02-10 NOTE — PROGRESS NOTES
PHACO EMULSIFICATION OF CATARACT WITH INTRA OCULAR LENS IMPLANT RIGHT EYE    CATARACT REMOVAL WITH IMPLANT Left 06/14/2018    HYSTERECTOMY      KNEE ARTHROSCOPY Right 2015       Family History   Problem Relation Age of Onset    Diabetes Sister     Heart Disease Sister     High Blood Pressure Sister     Diabetes Brother        CareTeam (Including outside providers/suppliers regularly involved in providing care):   Patient Care Team:  PILY Baker CNP as PCP - General (Nurse Practitioner)  PILY Baker CNP as PCP - Nasim Warrenled Provider    Wt Readings from Last 3 Encounters:   09/16/19 199 lb (90.3 kg)   05/13/19 200 lb (90.7 kg)   02/13/19 198 lb (89.8 kg)      No flowsheet data found. There is no height or weight on file to calculate BMI. Based upon direct observation of the patient, evaluation of cognition reveals recent and remote memory intact. Patient's complete Health Risk Assessment and screening values have been reviewed and are found in Flowsheets. The following problems were reviewed today and where indicated follow up appointments were made and/or referrals ordered. Positive Risk Factor Screenings with Interventions:          General Health and ACP:  General  In general, how would you say your health is?: Good  In the past 7 days, have you experienced any of the following?  New or Increased Pain, New or Increased Fatigue, Loneliness, Social Isolation, Stress or Anger?: None of These  Do you get the social and emotional support that you need?: Yes  Do you have a Living Will?: (!) No  Advance Directives     Power of [facility] Will ACP-Advance Directive ACP-Power of     Not on File Not on File Not on File Not on File      General Health Risk Interventions:  · No Living Will: Advance Care Planning addressed with patient today and Patient declines ACP discussion/assistance    Health Habits/Nutrition:  Health Habits/Nutrition Do you exercise for at least 20 minutes 2-3 times per week?: (!) No  Have you lost any weight without trying in the past 3 months?: No  Do you eat only one meal per day?: No  Have you seen the dentist within the past year?: N/A - wear dentures     Health Habits/Nutrition Interventions:  · Inadequate physical activity:  patient is not ready to increase his/her physical activity level at this time    Hearing/Vision:  No exam data present  Hearing/Vision  Do you or your family notice any trouble with your hearing that hasn't been managed with hearing aids?: No  Do you have difficulty driving, watching TV, or doing any of your daily activities because of your eyesight?: No  Have you had an eye exam within the past year?: (!) No  Hearing/Vision Interventions:  · Vision concerns:  patient declines any further evaluation/treatment for this issue, she is worried about Coronavirus     Safety:  Safety  Do you have working smoke detectors?: Yes  Have all throw rugs been removed or fastened?: Yes  Do you have non-slip mats or surfaces in all bathtubs/showers?: (!) No  Do all of your stairways have a railing or banister?: (!) No  Are your doorways, halls and stairs free of clutter?: Yes  Safety Interventions:  · Home safety tips provided     Personalized Preventive Plan   Current Health Maintenance Status  Immunization History   Administered Date(s) Administered    Influenza Virus Vaccine 09/25/2012    Influenza, High Dose (Fluzone 65 yrs and older) 11/06/2014, 11/10/2016    Influenza, Quadv, IM, (6 mo and older Fluzone, Flulaval, Fluarix and 3 yrs and older Afluria) 10/18/2018    Influenza, Quadv, IM, PF (6 mo and older Fluzone, Flulaval, Fluarix, and 3 yrs and older Afluria) 10/25/2017, 10/10/2019    Pneumococcal Conjugate 13-valent ([id number]) 12/08/2017    Pneumococcal Polysaccharide ([id number]) 03/10/2016        Health Maintenance   Topic Date Due    Hepatitis C screen  1940 --PILY Brush - CNP on 2/10/2021 at 10:42 AM    An electronic signature was used to authenticate this note.

## 2021-02-10 NOTE — PROGRESS NOTES
2/10/2021    TELEHEALTH EVALUATION -- Audio/Visual (During JPIEZ-88 public health emergency)    HPI:    Johnathan Fairbanks (:  1940) has requested an audio/video evaluation for the following concern(s):    HPI    Patient is here for follow up on diabetes. Taking medication as prescribed. Denies any hypoglycemia episodes. Denies any increased urination, hunger, or thirst.  Trying to eat a healthy, diabetic diet. She is not checking her glucose regularly. Patient is here to follow up on hypothyroidism. Taking medication as prescribed. Denies any palpitations, diarrhea, tremors, increased fatigue. She admits to chronic constipation and will eat prunes and take Miralax as needed. She has a bowel movement everyday. Denies any side effect from the medication. Patient is here to follow up on vitamin D deficiency. Patient is taking vitamin D supplement without any adverse effects as prescribed. Patient is here today to follow up on hypertension. She is taking HCTZ 25 mg daily. Taking medications as prescribed. Is trying to adhere to a no salt diet. Denies any chest pain, leg swelling, orthopnea, dizziness. She has been checking her blood pressure at home and it has been running around 110/60. [facility] has CKD III. She states she is drinking more water and has stopped drinking coke. She states her urination is the same as usual.  She urinates 4-6 times during the day and 1-2 times at night. She denies any blood in her urine. She denies any dysuria. Review of Systems   Constitutional: Positive for fatigue. Negative for activity change, appetite change, chills, diaphoresis, fever and unexpected weight change. HENT: Negative. Negative for ear pain, rhinorrhea, sinus pressure, sneezing, sore throat and trouble swallowing. Eyes: Negative for photophobia, pain, discharge, redness, itching and visual disturbance. Respiratory: Negative. Negative for apnea, cough, choking, chest tightness, shortness of breath, wheezing and stridor. Cardiovascular: Negative for chest pain, palpitations and leg swelling. Gastrointestinal: Positive for constipation (At times ). Negative for abdominal distention, abdominal pain, anal bleeding, blood in stool, diarrhea, nausea, rectal pain and vomiting. Genitourinary: Negative. Negative for decreased urine volume, difficulty urinating, dysuria, enuresis, flank pain, frequency, genital sores, hematuria and urgency. Musculoskeletal: Negative. Negative for arthralgias, back pain, gait problem, joint swelling, myalgias, neck pain and neck stiffness. Skin: Negative. Negative for color change, pallor, rash and wound. Allergic/Immunologic: Negative. Neurological: Negative. Negative for dizziness, facial asymmetry, weakness, light-headedness and headaches. Psychiatric/Behavioral: Negative for agitation, behavioral problems, confusion, decreased concentration, dysphoric mood, hallucinations, self-injury, sleep disturbance and suicidal ideas. The patient is not nervous/anxious and is not hyperactive. Prior to Visit Medications    Medication Sig Taking?  Authorizing Provider   vitamin D 25 MCG (1000 UT) CAPS Take by mouth Yes Historical Provider, MD   hydroCHLOROthiazide (HYDRODIURIL) 25 MG tablet TAKE 1 TABLET BY MOUTH DAILY Yes PILY Puente CNP   glipiZIDE (GLUCOTROL XL) 10 MG extended release tablet TAKE 1 TABLET BY MOUTH 2 TIMES DAILY Yes PILY Puente CNP   levothyroxine (SYNTHROID) 100 MCG tablet TAKE 1 TABLET BY MOUTH DAILY Yes PILY Puente CNP   aspirin 81 MG tablet Take 81 mg by mouth Yes Historical Provider, MD       Social History     Tobacco Use    Smoking status: Never Smoker    Smokeless tobacco: Never Used   Substance Use Topics    Alcohol use: No    Drug use: No        Allergies   Allergen Reactions  Food Color Pink Anaphylaxis    Shellfish Allergy Anaphylaxis     Heart scan    Atorvastatin      Other reaction(s): Weakness  Leg weakness    Ramipril      Other reaction(s): Cough    Metformin Nausea And Vomiting   ,   Past Medical History:   Diagnosis Date    CKD (chronic kidney disease) stage 3, GFR 30-59 ml/min ([facility]) 9/17/2019    Hypertension     Hypothyroidism     Mixed hyperlipidemia     Obesity     Thyrotoxicosis     Type 2 diabetes mellitus without complication ([facility])     Type II or unspecified type diabetes mellitus without mention of complication, not stated as uncontrolled    ,   Past Surgical History:   Procedure Laterality Date    ANKLE FRACTURE SURGERY Right 2007    CARPAL TUNNEL RELEASE Left 1998    CATARACT REMOVAL WITH IMPLANT Right 03/01/2018     PHACO EMULSIFICATION OF CATARACT WITH INTRA OCULAR LENS IMPLANT RIGHT EYE    CATARACT REMOVAL WITH IMPLANT Left 06/14/2018    HYSTERECTOMY      KNEE ARTHROSCOPY Right 2015   ,   Social History     Tobacco Use    Smoking status: Never Smoker    Smokeless tobacco: Never Used   Substance Use Topics    Alcohol use: No    Drug use: No   ,   Family History   Problem Relation Age of Onset    Diabetes Sister     Heart Disease Sister     High Blood Pressure Sister     Diabetes Brother    ,   Immunization History   Administered Date(s) Administered    Influenza Virus Vaccine 09/25/2012    Influenza, High Dose (Fluzone 65 yrs and older) 11/06/2014, 11/10/2016    Influenza, Quadv, IM, (6 mo and older Fluzone, Flulaval, Fluarix and 3 yrs and older Afluria) 10/18/2018    Influenza, Quadv, IM, PF (6 mo and older Fluzone, Flulaval, Fluarix, and 3 yrs and older Afluria) 10/25/2017, 10/10/2019    Pneumococcal Conjugate 13-valent ([id number]) 12/08/2017    Pneumococcal Polysaccharide ([id number]) 03/10/2016   ,   Health Maintenance   Topic Date Due    Hepatitis C screen  1940    COVID-19 Vaccine (1 of 2) 09/08/1957  DTaP/Tdap/Td vaccine (1 - Tdap) 09/08/1960    Shingles Vaccine (1 of 2) 09/08/1991    DEXA (modify frequency per FRAX score)  09/08/1996    Annual Wellness Visit (AWV)  05/29/2019    TSH testing  02/13/2020    Potassium monitoring  09/16/2020    Creatinine monitoring  09/16/2020    Flu vaccine  Completed    Pneumococcal 65+ years Vaccine  Completed    Hepatitis A vaccine  Aged Out    Hib vaccine  Aged Out    Meningococcal (ACWY) vaccine  Aged Out       PHYSICAL EXAMINATION:  [ INSTRUCTIONS:  \"[x]\" Indicates a positive item  \"[]\" Indicates a negative item      Vital Signs: (As obtained by patient/caregiver or practitioner observation)    Blood pressure-  Heart rate-    Respiratory rate-    Temperature-  Pulse oximetry-     Constitutional: [x] Appears well-developed and well-nourished [x] No apparent distress      [] Abnormal-   Mental status  [x] Alert and awake  [x] Oriented to person/place/time [x]Able to follow commands      Eyes:  EOM    [x]  Normal  [] Abnormal-  Sclera  [x]  Normal  [] Abnormal -         Discharge [x]  None visible  [] Abnormal -    HENT:   [x] Normocephalic, atraumatic.   [] Abnormal   [x] Mouth/Throat: Mucous membranes are moist.     External Ears [x] Normal  [] Abnormal-     Neck: [x] No visualized mass     Pulmonary/Chest: [x] Respiratory effort normal.  [x] No visualized signs of difficulty breathing or respiratory distress        [] Abnormal-      Musculoskeletal:   [x] Normal gait with no signs of ataxia         [x] Normal range of motion of neck        [] Abnormal-       Neurological:        [x] No Facial Asymmetry (Cranial nerve 7 motor function) (limited exam to video visit)          [x] No gaze palsy        [] Abnormal-         Skin:        [x] No significant exanthematous lesions or discoloration noted on facial skin         [] Abnormal-            Psychiatric:       [x] Normal Affect [x] No Hallucinations        [] Abnormal- Other pertinent observable physical exam findings-     ASSESSMENT/PLAN:  Candido Harrell was seen today for medicare awv. Thyroid symptoms stable. DM symptoms stable - she declines labs as she does not want to go out with Coronavirus. BP stable per patient. CKD symptoms stable - she declines labs even if staff go to her car at this time. She is willing to reevaluate in spring regarding labs. Diagnoses and all orders for this visit:    Hypothyroidism due to Hashimoto's thyroiditis    Type 2 diabetes mellitus with hyperglycemia, without long-term current use of insulin (HCC)    Essential hypertension    Mixed hyperlipidemia    Stage 3a chronic kidney disease    Vitamin D deficiency        Return in about 4 months (around 6/10/2021) for [facility] after getting labs . Reji Bui is a 79 y.o. female being evaluated by a Virtual Visit (video visit) encounter to address concerns as mentioned above. A caregiver was present when appropriate. Due to this being a TeleHealth encounter (During JZSJP-53 public health emergency), evaluation of the following organ systems was limited: Vitals/Constitutional/EENT/Resp/CV/GI//MS/Neuro/Skin/Heme-Lymph-Imm. Pursuant to the emergency declaration under the [facility] [address] authority and the [employer] General Act, this Virtual Visit was conducted with patient's (and/or legal guardian's) consent, to reduce the patient's risk of exposure to COVID-19 and provide necessary medical care. The patient (and/or legal guardian) has also been advised to contact this office for worsening conditions or problems, and seek emergency medical treatment and/or call 911 if deemed necessary.      Patient identification was verified at the start of the visit: Yes Services were provided through a video synchronous discussion virtually to substitute for in-person clinic visit. Patient and provider were located at their individual homes. --PILY Yen CNP on 2/10/2021 at 11:03 AM    An electronic signature was used to authenticate this note. Patient should call the office immediately with new or ongoing signs or symptoms or worsening, or proceed to the emergency room. All entries in chief complaint and history of present illness are reviewed and validated by me. No changes in past medical history, past surgical history, social history, or family history were noted during the patient encounter unless specifically listed above. All updates of past medical history, past surgical history, social history, or family history were reviewed personally by me during the office visit. All problems listed in the assessment are stable unless noted otherwise. Medication profile reviewed personally by me during the office visit. Medication side effects and possible impairments from medications were discussed as applicable. Every effort has been made to assure accurate transcription by this voice recognition software. However, mistakes in transcription may still occur    You are being started on a new medication. All medications have the potential for adverse effects. All medications effect each person differently. Please read and review provided information related to medication. If the medication that you have been prescribed has the potential to cause sedation, do not drive or operate car, truck, or heavy machinery until you know how the medication will effect you. If you experience any adverse effects from the medication, please call the office or report to the emergency department.

## 2021-04-29 DIAGNOSIS — I10 ESSENTIAL HYPERTENSION: ICD-10-CM

## 2021-04-29 DIAGNOSIS — E03.8 OTHER SPECIFIED HYPOTHYROIDISM: ICD-10-CM

## 2021-04-29 DIAGNOSIS — E11.9 TYPE 2 DIABETES MELLITUS WITHOUT COMPLICATION, WITHOUT LONG-TERM CURRENT USE OF INSULIN (HCC): ICD-10-CM

## 2021-04-29 RX ORDER — HYDROCHLOROTHIAZIDE 25 MG/1
25 TABLET ORAL DAILY
Qty: 90 TABLET | Refills: 0 | Status: SHIPPED | OUTPATIENT
Start: 2021-04-29 | End: 2021-07-28

## 2021-04-29 RX ORDER — GLIPIZIDE 10 MG/1
10 TABLET, FILM COATED, EXTENDED RELEASE ORAL 2 TIMES DAILY
Qty: 180 TABLET | Refills: 0 | Status: SHIPPED | OUTPATIENT
Start: 2021-04-29 | End: 2021-09-21 | Stop reason: DRUGHIGH

## 2021-04-29 RX ORDER — LEVOTHYROXINE SODIUM 0.1 MG/1
100 TABLET ORAL DAILY
Qty: 90 TABLET | Refills: 0 | Status: SHIPPED | OUTPATIENT
Start: 2021-04-29 | End: 2021-07-28

## 2021-07-06 ENCOUNTER — TELEPHONE (OUTPATIENT)
Dept: FAMILY MEDICINE CLINIC | Age: 81
End: 2021-07-06

## 2021-07-06 NOTE — TELEPHONE ENCOUNTER
----- Message from Raudel Wolf sent at 7/6/2021  1:36 PM EDT -----  Subject: Message to Provider    QUESTIONS  Information for Provider? Patient is trying to get her and son Jennifer Arreaga   schedule needed back to back since son is wheelchair bound and she is his   caregiver. Needs end of day due to not wanting have so much exposers. ---------------------------------------------------------------------------  --------------  Lu BUCK  What is the best way for the office to contact you? OK to leave message on   voicemail  Preferred Call Back Phone Number? [phone]  ---------------------------------------------------------------------------  --------------  SCRIPT ANSWERS  Relationship to Patient?  Self

## 2021-07-20 ENCOUNTER — OFFICE VISIT (OUTPATIENT)
Dept: FAMILY MEDICINE CLINIC | Age: 81
End: 2021-07-20
Payer: MEDICARE

## 2021-07-20 VITALS
OXYGEN SATURATION: 98 % | HEIGHT: 67 IN | WEIGHT: 170 LBS | DIASTOLIC BLOOD PRESSURE: 80 MMHG | SYSTOLIC BLOOD PRESSURE: 122 MMHG | HEART RATE: 88 BPM | BODY MASS INDEX: 26.68 KG/M2

## 2021-07-20 DIAGNOSIS — E11.65 TYPE 2 DIABETES MELLITUS WITH HYPERGLYCEMIA, WITHOUT LONG-TERM CURRENT USE OF INSULIN (HCC): ICD-10-CM

## 2021-07-20 DIAGNOSIS — E78.2 MIXED HYPERLIPIDEMIA: ICD-10-CM

## 2021-07-20 DIAGNOSIS — E06.3 HYPOTHYROIDISM DUE TO HASHIMOTO'S THYROIDITIS: Primary | ICD-10-CM

## 2021-07-20 DIAGNOSIS — R63.4 ABNORMAL WEIGHT LOSS: ICD-10-CM

## 2021-07-20 DIAGNOSIS — K21.9 GASTROESOPHAGEAL REFLUX DISEASE WITHOUT ESOPHAGITIS: ICD-10-CM

## 2021-07-20 DIAGNOSIS — E03.8 HYPOTHYROIDISM DUE TO HASHIMOTO'S THYROIDITIS: Primary | ICD-10-CM

## 2021-07-20 DIAGNOSIS — R31.21 ASYMPTOMATIC MICROSCOPIC HEMATURIA: ICD-10-CM

## 2021-07-20 DIAGNOSIS — Z13.21 ENCOUNTER FOR VITAMIN DEFICIENCY SCREENING: ICD-10-CM

## 2021-07-20 DIAGNOSIS — I10 ESSENTIAL HYPERTENSION: ICD-10-CM

## 2021-07-20 DIAGNOSIS — E55.9 VITAMIN D DEFICIENCY: ICD-10-CM

## 2021-07-20 LAB
BASOPHILS ABSOLUTE: 0 K/UL (ref 0–0.2)
BASOPHILS RELATIVE PERCENT: 0.5 %
BILIRUBIN, POC: NORMAL
BLOOD URINE, POC: NORMAL
CLARITY, POC: NORMAL
COLOR, POC: NORMAL
EOSINOPHILS ABSOLUTE: 0.1 K/UL (ref 0–0.6)
EOSINOPHILS RELATIVE PERCENT: 0.9 %
GLUCOSE URINE, POC: NORMAL
HCT VFR BLD CALC: 40.5 % (ref 36–48)
HEMOGLOBIN: 13.9 G/DL (ref 12–16)
KETONES, POC: NORMAL
LEUKOCYTE EST, POC: NORMAL
LYMPHOCYTES ABSOLUTE: 1.5 K/UL (ref 1–5.1)
LYMPHOCYTES RELATIVE PERCENT: 21.1 %
MCH RBC QN AUTO: 32.3 PG (ref 26–34)
MCHC RBC AUTO-ENTMCNC: 34.4 G/DL (ref 31–36)
MCV RBC AUTO: 94 FL (ref 80–100)
MONOCYTES ABSOLUTE: 0.5 K/UL (ref 0–1.3)
MONOCYTES RELATIVE PERCENT: 7.5 %
NEUTROPHILS ABSOLUTE: 4.9 K/UL (ref 1.7–7.7)
NEUTROPHILS RELATIVE PERCENT: 70 %
NITRITE, POC: NORMAL
PDW BLD-RTO: 13.6 % (ref 12.4–15.4)
PH, POC: 5.5
PLATELET # BLD: 396 K/UL (ref 135–450)
PMV BLD AUTO: 8.6 FL (ref 5–10.5)
PROTEIN, POC: NORMAL
RBC # BLD: 4.32 M/UL (ref 4–5.2)
SPECIFIC GRAVITY, POC: 1.02
UROBILINOGEN, POC: 1
WBC # BLD: 7 K/UL (ref 4–11)

## 2021-07-20 PROCEDURE — 1090F PRES/ABSN URINE INCON ASSESS: CPT | Performed by: NURSE PRACTITIONER

## 2021-07-20 PROCEDURE — 1123F ACP DISCUSS/DSCN MKR DOCD: CPT | Performed by: NURSE PRACTITIONER

## 2021-07-20 PROCEDURE — G8400 PT W/DXA NO RESULTS DOC: HCPCS | Performed by: NURSE PRACTITIONER

## 2021-07-20 PROCEDURE — 4040F PNEUMOC VAC/ADMIN/RCVD: CPT | Performed by: NURSE PRACTITIONER

## 2021-07-20 PROCEDURE — G8427 DOCREV CUR MEDS BY ELIG CLIN: HCPCS | Performed by: NURSE PRACTITIONER

## 2021-07-20 PROCEDURE — G8417 CALC BMI ABV UP PARAM F/U: HCPCS | Performed by: NURSE PRACTITIONER

## 2021-07-20 PROCEDURE — 99215 OFFICE O/P EST HI 40 MIN: CPT | Performed by: NURSE PRACTITIONER

## 2021-07-20 PROCEDURE — 36415 COLL VENOUS BLD VENIPUNCTURE: CPT | Performed by: NURSE PRACTITIONER

## 2021-07-20 PROCEDURE — 81002 URINALYSIS NONAUTO W/O SCOPE: CPT | Performed by: NURSE PRACTITIONER

## 2021-07-20 PROCEDURE — 1036F TOBACCO NON-USER: CPT | Performed by: NURSE PRACTITIONER

## 2021-07-20 RX ORDER — OMEPRAZOLE 40 MG/1
40 CAPSULE, DELAYED RELEASE ORAL
Qty: 90 CAPSULE | Refills: 0 | Status: SHIPPED | OUTPATIENT
Start: 2021-07-20 | End: 2021-10-18

## 2021-07-20 ASSESSMENT — ENCOUNTER SYMPTOMS
BLOOD IN STOOL: 0
VOMITING: 0
COLOR CHANGE: 0
DIARRHEA: 0
SINUS PRESSURE: 0
STRIDOR: 0
CONSTIPATION: 0
EYE PAIN: 0
EYE ITCHING: 0
CHOKING: 0
ALLERGIC/IMMUNOLOGIC NEGATIVE: 1
ABDOMINAL PAIN: 0
PHOTOPHOBIA: 0
EYE DISCHARGE: 0
EYE REDNESS: 0
NAUSEA: 0
COUGH: 0
SORE THROAT: 0
BACK PAIN: 0
APNEA: 0
CHEST TIGHTNESS: 0
RESPIRATORY NEGATIVE: 1
WHEEZING: 0
SHORTNESS OF BREATH: 0
RHINORRHEA: 0
TROUBLE SWALLOWING: 0
GASTROINTESTINAL NEGATIVE: 1

## 2021-07-20 NOTE — PROGRESS NOTES
[address] PHYS[facility]IAN PRACTICES  [facility]  [address] [place] [address]  Dept: [phone]  Dept Fax: [phone]  Loc: [phone]    Kole Begum is a 79 y.o. female who presents today for her medical conditions/complaints as noted below. Kole Begum is c/o of Diabetes (pt takes medication every day and does nto check her sugar at home. ), Hypothyroidism (pt takes medication every day on an empty stomach. ), and Other (pt has noticed in her stool her pills are whole and has noticed 6 pills in the last 5 days. pt doesnt have much of an appetite. )        HPI:     Chief Complaint   Patient presents with    Diabetes     pt takes medication every day and does nto check her sugar at home.  Hypothyroidism     pt takes medication every day on an empty stomach.  Other     pt has noticed in her stool her pills are whole and has noticed 6 pills in the last 5 days. pt doesnt have much of an appetite. HPI    Patient presents to the office today for the first time since 2019 as she has been quarantining at home with Coronavirus. Since 2019, she has lost about 30 pounds. Her weight was around 200 and now she is weighing 170 pounds. She states she does not have much of an appetite. She admits to noticing she does not digest her food well as she has noticed pills in her stool that are whole. She admits to mainly having constipation, will take laxatives, then will have diarrhea. She admits to epigastric burning sensation at times. Patient is here to follow up on hypothyroidism. Taking medication as prescribed. Denies any palpitations, tremors, increased fatigue. Denies any side effect from the medication. Patient is here for follow up on diabetes. Taking medication as prescribed. Denies any hypoglycemia episodes. Denies any increased urination or hunger. She admits to being more thirsty more than usual. She does not check her glucose at home. She declines a statin. She declines being on an ACE or ARB for kidney protection. Patient is here today to follow up on hypertension. Taking medications as prescribed. Is trying to adhere to a no salt diet. Denies any chest pain, leg swelling, orthopnea, dizziness. Patient is here to follow up on vitamin D deficiency. Patient is taking vitamin D supplement without any adverse effects as prescribed.         Past Medical History:   Diagnosis Date    CKD (chronic kidney disease) stage 3, GFR 30-59 ml/min ([facility]) 9/17/2019    Hypertension     Hypothyroidism     Mixed hyperlipidemia     Obesity     Thyrotoxicosis     Type 2 diabetes mellitus without complication ([facility])     Type II or unspecified type diabetes mellitus without mention of complication, not stated as uncontrolled       Past Surgical History:   Procedure Laterality Date    ANKLE FRACTURE SURGERY Right 2007    CARPAL TUNNEL RELEASE Left 1998    CATARACT REMOVAL WITH IMPLANT Right 03/01/2018     PHACO EMULSIFICATION OF CATARACT WITH INTRA OCULAR LENS IMPLANT RIGHT EYE    CATARACT REMOVAL WITH IMPLANT Left 06/14/2018    HYSTERECTOMY      KNEE ARTHROSCOPY Right 2015       Family History   Problem Relation Age of Onset    Diabetes Sister     Heart Disease Sister     High Blood Pressure Sister     Diabetes Brother        Social History     Tobacco Use    Smoking status: Never Smoker    Smokeless tobacco: Never Used   Substance Use Topics    Alcohol use: No      Current Outpatient Medications   Medication Sig Dispense Refill    omeprazole (PRILOSEC) 40 MG delayed release capsule Take 1 capsule by mouth every morning (before breakfast) 90 capsule 0    glipiZIDE (GLUCOTROL XL) 10 MG extended release tablet TAKE 1 TABLET BY MOUTH 2 TIMES DAILY 180 tablet 0    hydroCHLOROthiazide (HYDRODIURIL) 25 MG tablet TAKE 1 TABLET BY MOUTH DAILY 90 tablet 0    levothyroxine (SYNTHROID) 100 MCG tablet TAKE 1 TABLET BY MOUTH DAILY 90 tablet 0    vitamin D 25 MCG (1000 UT) CAPS Take by mouth      aspirin 81 MG tablet Take 81 mg by mouth       No current facility-administered medications for this visit. Allergies   Allergen Reactions    Food Color Pink Anaphylaxis    Shellfish Allergy Anaphylaxis     Heart scan    Atorvastatin      Other reaction(s): Weakness  Leg weakness    Ramipril      Other reaction(s): Cough    Metformin Nausea And Vomiting       :      Review of Systems   Constitutional: Positive for appetite change and unexpected weight change. Negative for activity change, chills, diaphoresis, fatigue and fever. HENT: Negative. Negative for ear pain, rhinorrhea, sinus pressure, sneezing, sore throat and trouble swallowing. Eyes: Negative for photophobia, pain, discharge, redness, itching and visual disturbance. Respiratory: Negative. Negative for apnea, cough, choking, chest tightness, shortness of breath, wheezing and stridor. Cardiovascular: Negative for chest pain, palpitations and leg swelling. Gastrointestinal: Negative. Negative for abdominal pain, blood in stool, constipation, diarrhea, nausea and vomiting. Genitourinary: Negative. Negative for decreased urine volume, difficulty urinating, dysuria, enuresis, flank pain, frequency, genital sores, hematuria and urgency. Musculoskeletal: Negative. Negative for arthralgias, back pain, gait problem, joint swelling, myalgias, neck pain and neck stiffness. Skin: Negative. Negative for color change, pallor, rash and wound. Allergic/Immunologic: Negative. Neurological: Negative. Negative for dizziness, facial asymmetry, weakness, light-headedness and headaches. Psychiatric/Behavioral: Negative for agitation, behavioral problems, confusion, decreased concentration, dysphoric mood, hallucinations, self-injury, sleep disturbance and suicidal ideas. The patient is not nervous/anxious and is not hyperactive.           Objective:     Vitals:    07/20/21 1353 07/20/21 1357   BP: (!) 154/90 122/80   Site: Right Upper Arm Left Upper Arm   Position: Sitting Sitting   Cuff Size: Medium Adult Medium Adult   Pulse: 110 88   SpO2: 98%    Weight: 170 lb (77.1 kg)    Height: 5' 7\" (1.702 m)      Wt Readings from Last 3 Encounters:   07/20/21 170 lb (77.1 kg)   09/16/19 199 lb (90.3 kg)   05/13/19 200 lb (90.7 kg)     Temp Readings from Last 3 Encounters:   08/07/18 98.2 °F (36.8 °C) (Oral)   06/14/18 98 °F (36.7 °C) (Temporal)   06/11/18 97 °F (36.1 °C) (Temporal)     BP Readings from Last 3 Encounters:   07/20/21 122/80   09/16/19 124/78   05/13/19 (!) 146/74     Pulse Readings from Last 3 Encounters:   07/20/21 88   09/16/19 78   05/13/19 79     Physical Exam  Vitals and nursing note reviewed. Constitutional:       General: She is not in acute distress. Appearance: Normal appearance. She is well-developed. She is not diaphoretic. HENT:      Head: Normocephalic and atraumatic. Right Ear: Tympanic membrane, ear canal and external ear normal. There is no impacted cerumen. Left Ear: Tympanic membrane, ear canal and external ear normal. There is no impacted cerumen. Nose: Nose normal. No congestion or rhinorrhea. Mouth/Throat:      Mouth: Mucous membranes are moist.      Pharynx: Oropharynx is clear. No oropharyngeal exudate or posterior oropharyngeal erythema. Eyes:      General: No scleral icterus. Right eye: No discharge. Left eye: No discharge. Extraocular Movements: Extraocular movements intact. Conjunctiva/sclera: Conjunctivae normal.      Pupils: Pupils are equal, round, and reactive to light. Neck:      Vascular: No carotid bruit. Trachea: No tracheal deviation. Cardiovascular:      Rate and Rhythm: Normal rate and regular rhythm. Pulses: Normal pulses. Heart sounds: Normal heart sounds. No murmur heard. No friction rub. No gallop. Pulmonary:      Effort: Pulmonary effort is normal. No respiratory distress.       Breath sounds: Normal breath sounds. No stridor. No wheezing, rhonchi or rales. Chest:      Chest wall: No tenderness. Abdominal:      General: Bowel sounds are normal. There is no distension. Palpations: Abdomen is soft. There is no mass. Tenderness: There is no abdominal tenderness. There is no guarding or rebound. Hernia: No hernia is present. Musculoskeletal:         General: No swelling, tenderness, deformity or signs of injury. Normal range of motion. Cervical back: Normal range of motion and neck supple. No rigidity. No muscular tenderness. Right lower leg: No edema. Left lower leg: No edema. Lymphadenopathy:      Cervical: No cervical adenopathy. Skin:     General: Skin is warm and dry. Capillary Refill: Capillary refill takes less than 2 seconds. Coloration: Skin is not jaundiced or pale. Findings: No bruising, erythema, lesion or rash. Neurological:      General: No focal deficit present. Mental Status: She is alert and oriented to person, place, and time. Mental status is at baseline. Cranial Nerves: No cranial nerve deficit. Sensory: No sensory deficit. Motor: No weakness or abnormal muscle tone. Coordination: Coordination normal.      Gait: Gait normal.      Deep Tendon Reflexes: Reflexes are normal and symmetric. Reflexes normal.   Psychiatric:         Mood and Affect: Mood normal.         Behavior: Behavior normal.         Thought Content: Thought content normal.         Judgment: Judgment normal.         Hospital Outpatient Visit on 02/20/2020   Component Date Value Ref Range Status    Left Ventricular Ejection Fraction 02/20/2020 55   Final-Edited    LVEF MODALITY 02/20/2020 ECHO   Final-Edited           Assessment & Plan: The following diagnoses and conditions are stable with no further orders unless indicated:  1. Hypothyroidism due to Hashimoto's thyroiditis    2.  Type 2 diabetes mellitus with hyperglycemia, without long-term current use of insulin ([facility] [address]    3. Essential hypertension    4. Vitamin D deficiency    5. Mixed hyperlipidemia    6. Encounter for vitamin deficiency screening    7. Gastroesophageal reflux disease without esophagitis    8. Asymptomatic microscopic hematuria        Ralph Bernal was seen today for diabetes, hypothyroidism and other. Concern for malignancy with her weight loss and lack of appetite with epigastric pain. Recommended CT scan, but she cares for her son who is 24/7 care and does not have anyone to assist her in his care so she can go get a scan. She declines at this time. Will obtain her A1C, TSH w reflex, and CBC. Possible that her hypothyroidism is being overly treated with her weight loss and she may need a decrease dosage. A1C could also be uncontrolled with her not coming into the office for 2 years now. Will evaluate labs for further causes of her weight loss. Most likely will need referral for GI to evaluate further - she admits she will not be able to get any testing with not having anyone to care for her son. BP stable - no changes in medications at this time. She declines to take ACE or ARB for kidney protection as she states even the lowest dose causes her to have dizziness. She is doing well on her vitamin D - will recheck her vitamin D today. Will update labs today. Will start on PPI for her epigastric pain. UA reveals moderate amount of blood - will send culture. If culture is negative, will consider urology referral.     Ralph Bernal was seen today for diabetes, hypothyroidism and other.     Diagnoses and all orders for this visit:    Hypothyroidism due to Hashimoto's thyroiditis  -     TSH with Reflex    Type 2 diabetes mellitus with hyperglycemia, without long-term current use of insulin ([facility])  -     Hemoglobin A1C  -     POCT Urinalysis no Micro  -     Microalbumin / Creatinine Urine Ratio    Essential hypertension  -     CBC Auto Differential  - Comprehensive Metabolic Panel    Vitamin D deficiency  -     Vitamin D 25 Hydroxy    Mixed hyperlipidemia  -     Lipid Panel    Encounter for vitamin deficiency screening  -     Vitamin B12 & Folate    Gastroesophageal reflux disease without esophagitis  -     omeprazole (PRILOSEC) 40 MG delayed release capsule; Take 1 capsule by mouth every morning (before breakfast)    Asymptomatic microscopic hematuria  -     Culture, Urine    Abnormal weight loss          Prior to Visit Medications    Medication Sig Taking? Authorizing Provider   omeprazole (PRILOSEC) 40 MG delayed release capsule Take 1 capsule by mouth every morning (before breakfast) Yes PILY Livingston CNP   glipiZIDE (GLUCOTROL XL) 10 MG extended release tablet TAKE 1 TABLET BY MOUTH 2 TIMES DAILY Yes PILY Livingston CNP   hydroCHLOROthiazide (HYDRODIURIL) 25 MG tablet TAKE 1 TABLET BY MOUTH DAILY Yes PILY Livingston CNP   levothyroxine (SYNTHROID) 100 MCG tablet TAKE 1 TABLET BY MOUTH DAILY Yes PILY Livingston CNP   vitamin D 25 MCG (1000 UT) CAPS Take by mouth Yes Historical Provider, MD   aspirin 81 MG tablet Take 81 mg by mouth Yes Historical Provider, MD     Orders Placed This Encounter   Medications    omeprazole (PRILOSEC) 40 MG delayed release capsule     Sig: Take 1 capsule by mouth every morning (before breakfast)     Dispense:  90 capsule     Refill:  0         No follow-ups on file. Patient should call the office immediately with new or ongoing signs or symptoms or worsening, or proceedto the emergency room. No changes in past medical history, past surgical history, social history, or family history were noted during the patient encounter unless specifically listed above. All updates of past medicalhistory, past surgical history, social history, or family history were reviewed personally by me during the office visit.   All problems listed in the assessment are stable unless noted otherwise. Medication profilereviewed personally by me during the office visit. Medication side effects and possible impairments from medications were discussed as applicable. Call if pattern of symptoms change or persists for an extended time. This document was prepared by a combination of typing and transcription through a voice recognition software. All medications have the potential for adverse effects. All medications effect each person differently. Please read and review provided information related to medication. If the medication that you have been prescribed has the potential to cause sedation, do not drive or operate car, truck, or heavy machinery until you know how the medication will effect you. If you experience any adverse effects from the medication, please call the office or report to the emergency department. This provider spent 42 minutes reviewing previous progress notes/labs, discussing HPI, ROS, and treatment plan with patient, and completing progress note.

## 2021-07-20 NOTE — PATIENT INSTRUCTIONS
Patient Education        Abnormal Weight Loss: Care Instructions  Your Care Instructions     There are two types of weight lossnormal and abnormal. The normal kind happens when you are trying to lose weight by exercising more or eating less. The abnormal kind happens when you are not trying to lose weight. Many medical problems can cause abnormal weight loss. These include problems with your thyroid gland, long-term infections, mouth or throat problems that make it hard to eat, and digestive problems. They also include depression and cancer. Some medicines also may cause you to lose weight. You can work with your doctor to find the cause of your weight loss. You will probably need tests to do this. Follow-up care is a key part of your treatment and safety. Be sure to make and go to all appointments, and call your doctor if you are having problems. It's also a good idea to know your test results and keep a list of the medicines you take. How can you care for yourself at home? · Weigh yourself at the same time every day. It's best to do it first thing in the morning after you empty your bladder. Be sure to always wear the same amount of clothing. · Write down any changes in your weight and the possible causes. Discuss these with your doctor. · Your doctor may want you to change your diet. Do your best to follow his or her advice. · Ask your doctor if you should see a dietitian. This is a person who can help you plan meals that work best for your lifestyle. · Note any changes in bowel habits. These may include changes in how often you have a bowel movement. Other changes include the color and size of your stools and how solid they are. · If you are prescribed medicines, take them exactly as prescribed. Call your doctor if you think you are having a problem with your medicine. You will get more details on the specific medicines your doctor prescribes. When should you call for help?   Watch closely for changes in your health, and be sure to contact your doctor if:    · You do not get better as expected.     · You continue to lose weight. Where can you learn more? Go to https://[email or web address]. org and sign in to your [employer] account. Enter A790 in the [employer] box to learn more about \"Abnormal Weight Loss: Care Instructions. \"     If you do not have an account, please click on the \"Sign Up Now\" link. Current as of: March 17, 2021               Content Version: 12.9  © 1306-1570 [employer]. Care instructions adapted under license by [facility] ([facility]). If you have questions about a medical condition or this instruction, always ask your healthcare professional. [address] any warranty or liability for your use of this information.

## 2021-07-21 DIAGNOSIS — Z79.4 TYPE 2 DIABETES MELLITUS WITH HYPERGLYCEMIA, WITH LONG-TERM CURRENT USE OF INSULIN (HCC): Primary | ICD-10-CM

## 2021-07-21 DIAGNOSIS — E11.65 TYPE 2 DIABETES MELLITUS WITH HYPERGLYCEMIA, WITH LONG-TERM CURRENT USE OF INSULIN (HCC): Primary | ICD-10-CM

## 2021-07-21 DIAGNOSIS — E11.65 TYPE 2 DIABETES MELLITUS WITH HYPERGLYCEMIA, WITHOUT LONG-TERM CURRENT USE OF INSULIN (HCC): Primary | ICD-10-CM

## 2021-07-21 LAB
A/G RATIO: 1.2 (ref 1.1–2.2)
ALBUMIN SERPL-MCNC: 3.9 G/DL (ref 3.4–5)
ALP BLD-CCNC: 108 U/L (ref 40–129)
ALT SERPL-CCNC: 9 U/L (ref 10–40)
ANION GAP SERPL CALCULATED.3IONS-SCNC: 16 MMOL/L (ref 3–16)
AST SERPL-CCNC: 16 U/L (ref 15–37)
BILIRUB SERPL-MCNC: 0.4 MG/DL (ref 0–1)
BUN BLDV-MCNC: 14 MG/DL (ref 7–20)
CALCIUM SERPL-MCNC: 9.4 MG/DL (ref 8.3–10.6)
CHLORIDE BLD-SCNC: 95 MMOL/L (ref 99–110)
CHOLESTEROL, TOTAL: 193 MG/DL (ref 0–199)
CO2: 25 MMOL/L (ref 21–32)
CREAT SERPL-MCNC: 0.9 MG/DL (ref 0.6–1.2)
CREATININE URINE: 54.7 MG/DL (ref 28–259)
ESTIMATED AVERAGE GLUCOSE: 314.9 MG/DL
FOLATE: 14.97 NG/ML (ref 4.78–24.2)
GFR AFRICAN AMERICAN: >60
GFR NON-AFRICAN AMERICAN: >60
GLOBULIN: 3.3 G/DL
GLUCOSE BLD-MCNC: 458 MG/DL (ref 70–99)
HBA1C MFR BLD: 12.6 %
HDLC SERPL-MCNC: 63 MG/DL (ref 40–60)
LDL CHOLESTEROL CALCULATED: 109 MG/DL
MICROALBUMIN UR-MCNC: <1.2 MG/DL
MICROALBUMIN/CREAT UR-RTO: NORMAL MG/G (ref 0–30)
POTASSIUM SERPL-SCNC: 4 MMOL/L (ref 3.5–5.1)
SODIUM BLD-SCNC: 136 MMOL/L (ref 136–145)
TOTAL PROTEIN: 7.2 G/DL (ref 6.4–8.2)
TRIGL SERPL-MCNC: 107 MG/DL (ref 0–150)
TSH REFLEX: 0.77 UIU/ML (ref 0.27–4.2)
VITAMIN B-12: 332 PG/ML (ref 211–911)
VITAMIN D 25-HYDROXY: 30.6 NG/ML
VLDLC SERPL CALC-MCNC: 21 MG/DL

## 2021-07-21 RX ORDER — PEN NEEDLE, DIABETIC 29 GAUGE
1 NEEDLE, DISPOSABLE MISCELLANEOUS DAILY
Qty: 100 EACH | Refills: 3 | Status: SHIPPED | OUTPATIENT
Start: 2021-07-21 | End: 2021-07-21 | Stop reason: SDUPTHER

## 2021-07-21 RX ORDER — PEN NEEDLE, DIABETIC 29 GAUGE
1 NEEDLE, DISPOSABLE MISCELLANEOUS DAILY
Qty: 100 EACH | Refills: 3 | Status: SHIPPED | OUTPATIENT
Start: 2021-07-21 | End: 2022-07-29

## 2021-07-21 RX ORDER — INSULIN GLARGINE 100 [IU]/ML
10 INJECTION, SOLUTION SUBCUTANEOUS NIGHTLY
Qty: 5 PEN | Refills: 0 | Status: SHIPPED | OUTPATIENT
Start: 2021-07-21 | End: 2021-07-21 | Stop reason: SDUPTHER

## 2021-07-21 RX ORDER — INSULIN GLARGINE 100 [IU]/ML
10 INJECTION, SOLUTION SUBCUTANEOUS NIGHTLY
Qty: 5 PEN | Refills: 0 | Status: SHIPPED | OUTPATIENT
Start: 2021-07-21 | End: 2021-10-18

## 2021-07-22 LAB — URINE CULTURE, ROUTINE: NORMAL

## 2021-09-21 ENCOUNTER — VIRTUAL VISIT (OUTPATIENT)
Dept: FAMILY MEDICINE CLINIC | Age: 81
End: 2021-09-21
Payer: MEDICARE

## 2021-09-21 DIAGNOSIS — Z79.4 TYPE 2 DIABETES MELLITUS WITH HYPERGLYCEMIA, WITH LONG-TERM CURRENT USE OF INSULIN (HCC): Primary | ICD-10-CM

## 2021-09-21 DIAGNOSIS — R31.21 ASYMPTOMATIC MICROSCOPIC HEMATURIA: ICD-10-CM

## 2021-09-21 DIAGNOSIS — K21.9 GASTROESOPHAGEAL REFLUX DISEASE WITHOUT ESOPHAGITIS: ICD-10-CM

## 2021-09-21 DIAGNOSIS — E55.9 VITAMIN D DEFICIENCY: ICD-10-CM

## 2021-09-21 DIAGNOSIS — E11.65 TYPE 2 DIABETES MELLITUS WITH HYPERGLYCEMIA, WITH LONG-TERM CURRENT USE OF INSULIN (HCC): Primary | ICD-10-CM

## 2021-09-21 DIAGNOSIS — R63.4 ABNORMAL WEIGHT LOSS: ICD-10-CM

## 2021-09-21 DIAGNOSIS — E03.8 OTHER SPECIFIED HYPOTHYROIDISM: ICD-10-CM

## 2021-09-21 PROCEDURE — G8427 DOCREV CUR MEDS BY ELIG CLIN: HCPCS | Performed by: NURSE PRACTITIONER

## 2021-09-21 PROCEDURE — 1090F PRES/ABSN URINE INCON ASSESS: CPT | Performed by: NURSE PRACTITIONER

## 2021-09-21 PROCEDURE — 99213 OFFICE O/P EST LOW 20 MIN: CPT | Performed by: NURSE PRACTITIONER

## 2021-09-21 PROCEDURE — G8400 PT W/DXA NO RESULTS DOC: HCPCS | Performed by: NURSE PRACTITIONER

## 2021-09-21 PROCEDURE — 4040F PNEUMOC VAC/ADMIN/RCVD: CPT | Performed by: NURSE PRACTITIONER

## 2021-09-21 PROCEDURE — 1123F ACP DISCUSS/DSCN MKR DOCD: CPT | Performed by: NURSE PRACTITIONER

## 2021-09-21 RX ORDER — GLIPIZIDE 10 MG/1
10 TABLET, FILM COATED, EXTENDED RELEASE ORAL DAILY
Qty: 90 TABLET | Refills: 1 | Status: SHIPPED
Start: 2021-09-21 | End: 2021-12-02

## 2021-09-21 SDOH — ECONOMIC STABILITY: FOOD INSECURITY: WITHIN THE PAST 12 MONTHS, THE FOOD YOU BOUGHT JUST DIDN'T LAST AND YOU DIDN'T HAVE MONEY TO GET MORE.: NEVER TRUE

## 2021-09-21 SDOH — ECONOMIC STABILITY: FOOD INSECURITY: WITHIN THE PAST 12 MONTHS, YOU WORRIED THAT YOUR FOOD WOULD RUN OUT BEFORE YOU GOT MONEY TO BUY MORE.: NEVER TRUE

## 2021-09-21 ASSESSMENT — ENCOUNTER SYMPTOMS
EYE PAIN: 0
EYE ITCHING: 0
RESPIRATORY NEGATIVE: 1
CONSTIPATION: 0
NAUSEA: 0
TROUBLE SWALLOWING: 0
APNEA: 0
ALLERGIC/IMMUNOLOGIC NEGATIVE: 1
STRIDOR: 0
VOMITING: 0
SHORTNESS OF BREATH: 0
GASTROINTESTINAL NEGATIVE: 1
SINUS PRESSURE: 0
RHINORRHEA: 0
BACK PAIN: 0
DIARRHEA: 0
CHOKING: 0
ABDOMINAL PAIN: 0
EYE DISCHARGE: 0
CHEST TIGHTNESS: 0
WHEEZING: 0
PHOTOPHOBIA: 0
SORE THROAT: 0
COUGH: 0
EYE REDNESS: 0
COLOR CHANGE: 0
BLOOD IN STOOL: 0

## 2021-09-21 ASSESSMENT — SOCIAL DETERMINANTS OF HEALTH (SDOH): HOW HARD IS IT FOR YOU TO PAY FOR THE VERY BASICS LIKE FOOD, HOUSING, MEDICAL CARE, AND HEATING?: NOT HARD AT ALL

## 2021-09-21 NOTE — PROGRESS NOTES
2021    TELEHEALTH EVALUATION -- Audio (During [facility]-05 public health emergency)    HPI:    Lily Jackson (:  1940) has requested an audio evaluation for the following concern(s):    HPI    Patient is here for follow up on diabetes. Taking medication as prescribed- Lantus 10 units nightly and Glipizide 10 mg XL BID. She has noticed her glucose being in the 60-80s in the morning. Denies any increased urination, hunger, or thirst.  Trying to eat a healthy, diabetic diet. She has cut back on her bread intake. Her glucose overall has been around 200 or less. She states she is feeling better since her glucose being well-controlled. She is not feeling thirsty all the time anymore. She is not having as much diarrhea. She states she was noticing pills being whole in her stool, but has not noticed this anymore since being on insulin. She has not noticed anymore weight loss. Patient is here for GERD follow up. Has been doing well with medication. Has tried to make improvements in diet. Denies any side effects to medication. Would like to continue with medication. Patient is here to follow up on vitamin D deficiency. Patient is taking vitamin D supplement without any adverse effects as prescribed. Patient is here to follow up on hypothyroidism. Taking medication as prescribed. Denies any palpitations, diarrhea, tremors, constipation, increased fatigue. Denies any side effect from the medication. Bay Herrmann states she is unable to come into the office as she is caring for her son who is disabled. She cannot go to get a colonoscopy or a CT scan. She cannot come into the office for labs to be drawn or to repeat her UA. She states she feels better and has not lost anymore weight and does not want to schedule an appointment at this time. Review of Systems   Constitutional: Negative.   Negative for activity change, appetite change, chills, diaphoresis, fatigue, fever and unexpected weight change. HENT: Negative. Negative for ear pain, rhinorrhea, sinus pressure, sneezing, sore throat and trouble swallowing. Eyes: Negative for photophobia, pain, discharge, redness, itching and visual disturbance. Respiratory: Negative. Negative for apnea, cough, choking, chest tightness, shortness of breath, wheezing and stridor. Cardiovascular: Negative for chest pain, palpitations and leg swelling. Gastrointestinal: Negative. Negative for abdominal pain, blood in stool, constipation, diarrhea, nausea and vomiting. Genitourinary: Negative. Negative for decreased urine volume, difficulty urinating, dysuria, enuresis, flank pain, frequency, genital sores, hematuria and urgency. Musculoskeletal: Negative. Negative for arthralgias, back pain, gait problem, joint swelling, myalgias, neck pain and neck stiffness. Skin: Negative. Negative for color change, pallor, rash and wound. Allergic/Immunologic: Negative. Neurological: Negative. Negative for dizziness, facial asymmetry, weakness, light-headedness and headaches. Psychiatric/Behavioral: Negative for agitation, behavioral problems, confusion, decreased concentration, dysphoric mood, hallucinations, self-injury, sleep disturbance and suicidal ideas. The patient is not nervous/anxious and is not hyperactive. Prior to Visit Medications    Medication Sig Taking?  Authorizing Provider   glipiZIDE (GLUCOTROL XL) 10 MG extended release tablet Take 1 tablet by mouth daily Yes Corrinne Plumber, APRN - CNP   hydroCHLOROthiazide (HYDRODIURIL) 25 MG tablet TAKE 1 TABLET BY MOUTH DAILY Yes Corrinne Plumber, APRN - CNP   levothyroxine (SYNTHROID) 100 MCG tablet TAKE 1 TABLET BY MOUTH DAILY Yes Corrinne Plumber, APRN - CNP   insulin glargine (LANTUS SOLOSTAR) 100 UNIT/ML injection pen Inject 10 Units into the skin nightly Yes Corrinne Plumber, APRN - CNP   Insulin Pen Needle ([employer] PEN NEEDLES 29G) 29G X 12MM MISC 1 each by Does not apply route daily Yes PILY Fairchild CNP   blood glucose test strips (ASCENSIA AUTODISC VI;ONE TOUCH ULTRA TEST VI) strip 1 each by In Vitro route daily As needed.  Yes PILY Fairchild CNP   omeprazole (PRILOSEC) 40 MG delayed release capsule Take 1 capsule by mouth every morning (before breakfast) Yes PILY Fairchild CNP   vitamin D 25 MCG (1000 UT) CAPS Take by mouth Yes Historical Provider, MD   aspirin 81 MG tablet Take 81 mg by mouth  Patient not taking: Reported on 9/21/2021  Historical Provider, MD       Social History     Tobacco Use    Smoking status: Never Smoker    Smokeless tobacco: Never Used   Vaping Use    Vaping Use: Never used   Substance Use Topics    Alcohol use: No    Drug use: No        Allergies   Allergen Reactions    Food Color Pink Anaphylaxis    Shellfish Allergy Anaphylaxis     Heart scan    Atorvastatin      Other reaction(s): Weakness  Leg weakness    Ramipril      Other reaction(s): Cough    Metformin Nausea And Vomiting   ,   Past Medical History:   Diagnosis Date    CKD (chronic kidney disease) stage 3, GFR 30-59 ml/min ([facility]) 9/17/2019    Gastroesophageal reflux disease without esophagitis 7/20/2021    Hypertension     Hypothyroidism     Mixed hyperlipidemia     Obesity     Thyrotoxicosis     Type 2 diabetes mellitus without complication ([facility] [address]     Type II or unspecified type diabetes mellitus without mention of complication, not stated as uncontrolled    ,   Past Surgical History:   Procedure Laterality Date    ANKLE FRACTURE SURGERY Right 2007    CARPAL TUNNEL RELEASE Left 1998    CATARACT REMOVAL WITH IMPLANT Right 03/01/2018     PHACO EMULSIFICATION OF CATARACT WITH INTRA OCULAR LENS IMPLANT RIGHT EYE    CATARACT REMOVAL WITH IMPLANT Left 06/14/2018    HYSTERECTOMY      KNEE ARTHROSCOPY Right 2015   ,   Social History     Tobacco Use    Smoking status: Never Smoker    Smokeless tobacco: Never Used   Vaping Use  Vaping Use: Never used   Substance Use Topics    Alcohol use: No    Drug use: No   ,   Family History   Problem Relation Age of Onset    Diabetes Sister     Heart Disease Sister     High Blood Pressure Sister     Diabetes Brother    ,   Immunization History   Administered Date(s) Administered    COVID-19, Pfizer, PF, 30mcg/0.3mL 05/21/2021, 06/11/2021    Influenza Virus Vaccine 09/25/2012    Influenza, High Dose (Fluzone 65 yrs and older) 11/06/2014, 11/10/2016    Influenza, Quadv, IM, (6 mo and older Fluzone, Flulaval, Fluarix and 3 yrs and older Afluria) 10/18/2018    Influenza, Quadv, IM, PF (6 mo and older Fluzone, Flulaval, Fluarix, and 3 yrs and older Afluria) 10/25/2017, 10/25/2017, 10/10/2019    Influenza, Janas Rota, Recombinant, IM PF (Flublok 18 yrs and older) 10/21/2020    Pneumococcal Conjugate 13-valent ([id number]) 12/08/2017    Pneumococcal Polysaccharide ([id number]) 03/10/2016   ,   Health Maintenance   Topic Date Due    DEXA (modify frequency per FRAX score)  Never done    Flu vaccine (1) 09/01/2021    DTaP/Tdap/Td vaccine (1 - Tdap) 07/20/2022 (Originally 9/8/1959)    Shingles Vaccine (1 of 2) 07/20/2022 (Originally 9/8/1991)    Annual Wellness Visit (AWV)  02/11/2022    TSH testing  07/20/2022    Potassium monitoring  07/20/2022    Creatinine monitoring  07/20/2022    Pneumococcal 65+ years Vaccine  Completed    COVID-19 Vaccine  Completed    Hepatitis A vaccine  Aged Out    Hib vaccine  Aged Out    Meningococcal (ACWY) vaccine  Aged Out       PHYSICAL EXAMINATION: Unable to perform - audio only     ASSESSMENT/PLAN:  Neftaly Webber was seen today for diabetes and hypertension. Glucose readings have improved. She is having some hypoglycemic episodes less than the morning. Recommend her decreasing her glipizide XL 10 mg twice daily to daily. She is to call in about 2 to 3 weeks with her glucose readings. She has not lost any more weight.   She has cut back on her starches in her diet. She does not feel as thirsty as she was. She is not having as much diarrhea as she was previously as well. She does not want a get a CT scan or colonoscopy as she is caring for her disabled son. She also does not want to leave her house due to fear of possible coronavirus. She is unable to come into the office for any blood work or repeat UA when she had benign microscopic hematuria in July 2021. She is doing well on her thyroid medication. She is doing well with the omeprazole for her GERD. She is taking her vitamin D supplement as prescribed. Recommend a follow-up appointment in 8 weeks. She continues to decline any further treatment or evaluation for her abnormal weight loss, benign microscopic hematuria noted in her urine, and the diarrhea that she was having depite the negative consequences that may have to her health and wellbeing. Diagnoses and all orders for this visit:    Type 2 diabetes mellitus with hyperglycemia, with long-term current use of insulin (HCC)  -     glipiZIDE (GLUCOTROL XL) 10 MG extended release tablet; Take 1 tablet by mouth daily    Other specified hypothyroidism    Gastroesophageal reflux disease without esophagitis    Vitamin D deficiency    Asymptomatic microscopic hematuria    Abnormal weight loss        Return in about 8 weeks (around 11/16/2021) for DM . Meena Quintana is a [de-identified] y.o. female being evaluated by a Virtual Visit (audio visit) encounter to address concerns as mentioned above. A caregiver was present when appropriate. Due to this being a TeleHealth encounter ([facility]), evaluation of the following organ systems was limited: Vitals/Constitutional/EENT/Resp/CV/GI//MS/Neuro/Skin/Heme-Lymph-Imm.   Pursuant to the emergency declaration under the [facility], [facility] authority and the [employer] General Act, this Virtual Visit was conducted with patient's (and/or legal guardian's) consent, to reduce the patient's risk of exposure to COVID-19 and provide necessary medical care. The patient (and/or legal guardian) has also been advised to contact this office for worsening conditions or problems, and seek emergency medical treatment and/or call 911 if deemed necessary. Patient identification was verified at the start of the visit: Yes    Services were provided through a video synchronous discussion virtually to substitute for in-person clinic visit. Patient and provider were located at their individual homes. --PILY Hillman CNP on 9/21/2021 at 2:18 PM    An electronic signature was used to authenticate this note. Patient should call the office immediately with new or ongoing signs or symptoms or worsening, or proceed to the emergency room. All entries in chief complaint and history of present illness are reviewed and validated by me. No changes in past medical history, past surgical history, social history, or family history were noted during the patient encounter unless specifically listed above. All updates of past medical history, past surgical history, social history, or family history were reviewed personally by me during the office visit. All problems listed in the assessment are stable unless noted otherwise. Medication profile reviewed personally by me during the office visit. Medication side effects and possible impairments from medications were discussed as applicable. Every effort has been made to assure accurate transcription by this voice recognition software. However, mistakes in transcription may still occur    You are being started on a new medication. All medications have the potential for adverse effects. All medications effect each person differently. Please read and review provided information related to medication.  If the medication that you have been prescribed has the potential to cause sedation, do not drive or operate car, truck, or heavy machinery until you know how the medication will effect you. If you experience any adverse effects from the medication, please call the office or report to the emergency department.

## 2021-09-21 NOTE — PATIENT INSTRUCTIONS
Patient Education        Learning About Carbohydrate (Carb) Counting and Eating Out When You Have Diabetes  Why plan your meals? Meal planning can be a key part of managing diabetes. Planning meals and snacks with the right balance of carbohydrate, protein, and fat can help you keep your blood sugar at the target level you set with your doctor. You don't have to eat special foods. You can eat what your family eats, including sweets once in a while. But you do have to pay attention to how often you eat and how much you eat of certain foods. You may want to work with a dietitian or a certified diabetes educator. He or she can give you tips and meal ideas and can answer your questions about meal planning. This health professional can also help you reach a healthy weight if that is one of your goals. What should you know about eating carbs? Managing the amount of carbohydrate (carbs) you eat is an important part of healthy meals when you have diabetes. Carbohydrate is found in many foods. · Learn which foods have carbs. And learn the amounts of carbs in different foods. ? Bread, cereal, pasta, and rice have about 15 grams of carbs in a serving. A serving is 1 slice of bread (1 ounce), ½ cup of cooked cereal, or 1/3 cup of cooked pasta or rice. ? Fruits have 15 grams of carbs in a serving. A serving is 1 small fresh fruit, such as an apple or orange; ½ of a banana; ½ cup of cooked or canned fruit; ½ cup of fruit juice; 1 cup of melon or raspberries; or 2 tablespoons of dried fruit. ? Milk and no-sugar-added yogurt have 15 grams of carbs in a serving. A serving is 1 cup of milk or 2/3 cup of no-sugar-added yogurt. ? Starchy vegetables have 15 grams of carbs in a serving. A serving is ½ cup of mashed potatoes or sweet potato; 1 cup winter squash; ½ of a small baked potato; ½ cup of cooked beans; or ½ cup cooked corn or green peas.   · Learn how much carbs to eat each day and at each meal. A dietitian or CDE can teach you how to keep track of the amount of carbs you eat. This is called carbohydrate counting. · If you are not sure how to count carbohydrate grams, use the Plate Method to plan meals. It is a good, quick way to make sure that you have a balanced meal. It also helps you spread carbs throughout the day. ? Divide your plate by types of foods. Put non-starchy vegetables on half the plate, meat or other protein food on one-quarter of the plate, and a grain or starchy vegetable in the final quarter of the plate. To this you can add a small piece of fruit and 1 cup of milk or yogurt, depending on how many carbs you are supposed to eat at a meal.  · Try to eat about the same amount of carbs at each meal. Do not \"save up\" your daily allowance of carbs to eat at one meal.  · Proteins have very little or no carbs per serving. Examples of proteins are beef, chicken, turkey, fish, eggs, tofu, cheese, cottage cheese, and peanut butter. A serving size of meat is 3 ounces, which is about the size of a deck of cards. Examples of meat substitute serving sizes (equal to 1 ounce of meat) are 1/4 cup of cottage cheese, 1 egg, 1 tablespoon of peanut butter, and ½ cup of tofu. How can you eat out and still eat healthy? · Learn to estimate the serving sizes of foods that have carbohydrate. If you measure food at home, it will be easier to estimate the amount in a serving of restaurant food. · If the meal you order has too much carbohydrate (such as potatoes, corn, or baked beans), ask to have a low-carbohydrate food instead. Ask for a salad or green vegetables. · If you use insulin, check your blood sugar before and after eating out to help you plan how much to eat in the future. · If you eat more carbohydrate at a meal than you had planned, take a walk or do other exercise. This will help lower your blood sugar. What are some tips for eating healthy? · Limit saturated fat, such as the fat from meat and dairy products.  This is a healthy choice because people who have diabetes are at higher risk of heart disease. So choose lean cuts of meat and nonfat or low-fat dairy products. Use olive or canola oil instead of butter or shortening when cooking. · Don't skip meals. Your blood sugar may drop too low if you skip meals and take insulin or certain medicines for diabetes. · Check with your doctor before you drink alcohol. Alcohol can cause your blood sugar to drop too low. Alcohol can also cause a bad reaction if you take certain diabetes medicines. Follow-up care is a key part of your treatment and safety. Be sure to make and go to all appointments, and call your doctor if you are having problems. It's also a good idea to know your test results and keep a list of the medicines you take. Where can you learn more? Go to https://[employer]ildaeb.[employer]. org and sign in to your [employer] account. Enter Y358 in the 3C Plus box to learn more about \"Learning About Carbohydrate (Carb) Counting and Eating Out When You Have Diabetes. \"     If you do not have an account, please click on the \"Sign Up Now\" link. Current as of: August 31, 2020               Content Version: 12.9  © 5518-4854 Healthwise, Incorporated. Care instructions adapted under license by [facility] ([facility]). If you have questions about a medical condition or this instruction, always ask your healthcare professional. [address] any warranty or liability for your use of this information.

## 2021-10-12 ENCOUNTER — TELEPHONE (OUTPATIENT)
Dept: FAMILY MEDICINE CLINIC | Age: 81
End: 2021-10-12

## 2021-10-12 NOTE — TELEPHONE ENCOUNTER
----- Message from PILY Coker CNP sent at 10/12/2021  6:59 AM EDT -----  How is her glucose running with changing Glipizide from BID to QD? Any glucose readings less than 70? How is her weight?

## 2021-10-14 ENCOUNTER — TELEPHONE (OUTPATIENT)
Dept: FAMILY MEDICINE CLINIC | Age: 81
End: 2021-10-14

## 2021-10-14 NOTE — TELEPHONE ENCOUNTER
----- Message from PILY Mario CNP sent at 10/12/2021  6:59 AM EDT -----  How is her glucose running with changing Glipizide from BID to QD? Any glucose readings less than 70? How is her weight?

## 2021-10-18 DIAGNOSIS — Z79.4 TYPE 2 DIABETES MELLITUS WITH HYPERGLYCEMIA, WITH LONG-TERM CURRENT USE OF INSULIN (HCC): ICD-10-CM

## 2021-10-18 DIAGNOSIS — E11.65 TYPE 2 DIABETES MELLITUS WITH HYPERGLYCEMIA, WITH LONG-TERM CURRENT USE OF INSULIN (HCC): ICD-10-CM

## 2021-10-18 DIAGNOSIS — K21.9 GASTROESOPHAGEAL REFLUX DISEASE WITHOUT ESOPHAGITIS: ICD-10-CM

## 2021-10-18 RX ORDER — OMEPRAZOLE 40 MG/1
40 CAPSULE, DELAYED RELEASE ORAL
Qty: 90 CAPSULE | Refills: 0 | Status: SHIPPED | OUTPATIENT
Start: 2021-10-18 | End: 2022-02-07

## 2021-10-18 RX ORDER — INSULIN GLARGINE 100 [IU]/ML
10 INJECTION, SOLUTION SUBCUTANEOUS NIGHTLY
Qty: 15 ML | Refills: 0 | Status: SHIPPED | OUTPATIENT
Start: 2021-10-18 | End: 2022-05-02

## 2021-10-28 ENCOUNTER — TELEPHONE (OUTPATIENT)
Dept: FAMILY MEDICINE CLINIC | Age: 81
End: 2021-10-28

## 2021-10-28 NOTE — TELEPHONE ENCOUNTER
Pt was calling in her blood sugar readings:  114, 132, 173, 152, 163, 160, 178, 156. And she said that before lunch an dinner it runs around 200. She also weighed herself and it was 170.

## 2021-12-02 ENCOUNTER — TELEPHONE (OUTPATIENT)
Dept: FAMILY MEDICINE CLINIC | Age: 81
End: 2021-12-02

## 2021-12-02 DIAGNOSIS — E11.65 TYPE 2 DIABETES MELLITUS WITH HYPERGLYCEMIA, WITH LONG-TERM CURRENT USE OF INSULIN (HCC): ICD-10-CM

## 2021-12-02 DIAGNOSIS — Z79.4 TYPE 2 DIABETES MELLITUS WITH HYPERGLYCEMIA, WITH LONG-TERM CURRENT USE OF INSULIN (HCC): ICD-10-CM

## 2021-12-02 RX ORDER — GLIPIZIDE 5 MG/1
5 TABLET, FILM COATED, EXTENDED RELEASE ORAL DAILY
Qty: 30 TABLET | Refills: 1 | Status: SHIPPED | OUTPATIENT
Start: 2021-12-02 | End: 2022-03-14

## 2021-12-02 NOTE — TELEPHONE ENCOUNTER
Pt called stating that since she's been off of her glipizide her glucose has been going up. States that in the mornings it's been anywhere from 170-200, recently it's been in the 300's in the evening. Pt states that she hasn't been eating much due to the increase. Pt not sure if she should start back on the glipizide or if PCP would want her to do something else. Pt uses [facility] in [place]. Call back pt with recommendations [phone]  Routing to Berkley Brambila due to PCP out of office. Will also route to PCP.

## 2021-12-02 NOTE — TELEPHONE ENCOUNTER
I still have glipizide XL 10 mg on patient's medication list--okay for patient to start taking glipizide XL 5 mg once a day and schedule follow-up with patient's PCP  Monitor for any hypoglycemia and call office or report to ER if occurs

## 2021-12-30 ENCOUNTER — HOSPITAL ENCOUNTER (INPATIENT)
Age: 81
LOS: 7 days | Discharge: HOME | DRG: 330 | End: 2022-01-06
Attending: STUDENT IN AN ORGANIZED HEALTH CARE EDUCATION/TRAINING PROGRAM | Admitting: HOSPITALIST
Payer: MEDICARE

## 2021-12-30 ENCOUNTER — APPOINTMENT (OUTPATIENT)
Dept: CT IMAGING | Age: 81
DRG: 330 | End: 2021-12-30
Payer: MEDICARE

## 2021-12-30 ENCOUNTER — APPOINTMENT (OUTPATIENT)
Dept: GENERAL RADIOLOGY | Age: 81
DRG: 330 | End: 2021-12-30
Payer: MEDICARE

## 2021-12-30 DIAGNOSIS — K56.609 SBO (SMALL BOWEL OBSTRUCTION) (HCC): Primary | ICD-10-CM

## 2021-12-30 DIAGNOSIS — E87.6 HYPOKALEMIA: ICD-10-CM

## 2021-12-30 DIAGNOSIS — C80.0 CARCINOMATOSIS (HCC): ICD-10-CM

## 2021-12-30 LAB
A/G RATIO: 0.9 (ref 1.1–2.2)
ALBUMIN SERPL-MCNC: 3.5 G/DL (ref 3.4–5)
ALP BLD-CCNC: 104 U/L (ref 40–129)
ALT SERPL-CCNC: 8 U/L (ref 10–40)
ANION GAP SERPL CALCULATED.3IONS-SCNC: 14 MMOL/L (ref 3–16)
AST SERPL-CCNC: 15 U/L (ref 15–37)
BASOPHILS ABSOLUTE: 0 K/UL (ref 0–0.2)
BASOPHILS RELATIVE PERCENT: 0.3 %
BILIRUB SERPL-MCNC: 0.6 MG/DL (ref 0–1)
BUN BLDV-MCNC: 25 MG/DL (ref 7–20)
CALCIUM SERPL-MCNC: 9 MG/DL (ref 8.3–10.6)
CHLORIDE BLD-SCNC: 95 MMOL/L (ref 99–110)
CO2: 28 MMOL/L (ref 21–32)
CREAT SERPL-MCNC: 1.3 MG/DL (ref 0.6–1.2)
EOSINOPHILS ABSOLUTE: 0 K/UL (ref 0–0.6)
EOSINOPHILS RELATIVE PERCENT: 0.1 %
GFR AFRICAN AMERICAN: 48
GFR NON-AFRICAN AMERICAN: 39
GLUCOSE BLD-MCNC: 164 MG/DL
GLUCOSE BLD-MCNC: 164 MG/DL (ref 70–99)
GLUCOSE BLD-MCNC: 207 MG/DL (ref 70–99)
HCT VFR BLD CALC: 36.5 % (ref 36–48)
HEMOGLOBIN: 12.1 G/DL (ref 12–16)
INFLUENZA A: NOT DETECTED
INFLUENZA B: NOT DETECTED
LACTIC ACID, SEPSIS: 1 MMOL/L (ref 0.4–1.9)
LACTIC ACID, SEPSIS: 1.7 MMOL/L (ref 0.4–1.9)
LYMPHOCYTES ABSOLUTE: 1.1 K/UL (ref 1–5.1)
LYMPHOCYTES RELATIVE PERCENT: 13.8 %
MAGNESIUM: 2.1 MG/DL (ref 1.8–2.4)
MCH RBC QN AUTO: 30.2 PG (ref 26–34)
MCHC RBC AUTO-ENTMCNC: 33.2 G/DL (ref 31–36)
MCV RBC AUTO: 91 FL (ref 80–100)
MONOCYTES ABSOLUTE: 0.7 K/UL (ref 0–1.3)
MONOCYTES RELATIVE PERCENT: 9 %
NEUTROPHILS ABSOLUTE: 5.9 K/UL (ref 1.7–7.7)
NEUTROPHILS RELATIVE PERCENT: 76.8 %
PDW BLD-RTO: 13.7 % (ref 12.4–15.4)
PERFORMED ON: ABNORMAL
PHOSPHORUS: 2.8 MG/DL (ref 2.5–4.9)
PLATELET # BLD: 494 K/UL (ref 135–450)
PMV BLD AUTO: 7 FL (ref 5–10.5)
POTASSIUM REFLEX MAGNESIUM: 2.6 MMOL/L (ref 3.5–5.1)
RBC # BLD: 4.01 M/UL (ref 4–5.2)
SARS-COV-2 RNA, RT PCR: NOT DETECTED
SODIUM BLD-SCNC: 137 MMOL/L (ref 136–145)
TOTAL PROTEIN: 7.2 G/DL (ref 6.4–8.2)
TROPONIN: <0.01 NG/ML
WBC # BLD: 7.7 K/UL (ref 4–11)

## 2021-12-30 PROCEDURE — 80053 COMPREHEN METABOLIC PANEL: CPT

## 2021-12-30 PROCEDURE — 1200000000 HC SEMI PRIVATE

## 2021-12-30 PROCEDURE — 2060000000 HC ICU INTERMEDIATE R&B

## 2021-12-30 PROCEDURE — 96366 THER/PROPH/DIAG IV INF ADDON: CPT

## 2021-12-30 PROCEDURE — 2580000003 HC RX 258: Performed by: HOSPITALIST

## 2021-12-30 PROCEDURE — 6360000002 HC RX W HCPCS: Performed by: STUDENT IN AN ORGANIZED HEALTH CARE EDUCATION/TRAINING PROGRAM

## 2021-12-30 PROCEDURE — 6370000000 HC RX 637 (ALT 250 FOR IP): Performed by: STUDENT IN AN ORGANIZED HEALTH CARE EDUCATION/TRAINING PROGRAM

## 2021-12-30 PROCEDURE — 71045 X-RAY EXAM CHEST 1 VIEW: CPT

## 2021-12-30 PROCEDURE — 83605 ASSAY OF LACTIC ACID: CPT

## 2021-12-30 PROCEDURE — 36415 COLL VENOUS BLD VENIPUNCTURE: CPT

## 2021-12-30 PROCEDURE — 99285 EMERGENCY DEPT VISIT HI MDM: CPT

## 2021-12-30 PROCEDURE — 96375 TX/PRO/DX INJ NEW DRUG ADDON: CPT

## 2021-12-30 PROCEDURE — 83540 ASSAY OF IRON: CPT

## 2021-12-30 PROCEDURE — 82378 CARCINOEMBRYONIC ANTIGEN: CPT

## 2021-12-30 PROCEDURE — 84484 ASSAY OF TROPONIN QUANT: CPT

## 2021-12-30 PROCEDURE — 96361 HYDRATE IV INFUSION ADD-ON: CPT

## 2021-12-30 PROCEDURE — 84100 ASSAY OF PHOSPHORUS: CPT

## 2021-12-30 PROCEDURE — 71250 CT THORAX DX C-: CPT

## 2021-12-30 PROCEDURE — 85025 COMPLETE CBC W/AUTO DIFF WBC: CPT

## 2021-12-30 PROCEDURE — 83550 IRON BINDING TEST: CPT

## 2021-12-30 PROCEDURE — 2580000003 HC RX 258: Performed by: STUDENT IN AN ORGANIZED HEALTH CARE EDUCATION/TRAINING PROGRAM

## 2021-12-30 PROCEDURE — 83735 ASSAY OF MAGNESIUM: CPT

## 2021-12-30 PROCEDURE — 96365 THER/PROPH/DIAG IV INF INIT: CPT

## 2021-12-30 PROCEDURE — 82728 ASSAY OF FERRITIN: CPT

## 2021-12-30 PROCEDURE — 83036 HEMOGLOBIN GLYCOSYLATED A1C: CPT

## 2021-12-30 PROCEDURE — 93005 ELECTROCARDIOGRAM TRACING: CPT | Performed by: STUDENT IN AN ORGANIZED HEALTH CARE EDUCATION/TRAINING PROGRAM

## 2021-12-30 PROCEDURE — 87636 SARSCOV2 & INF A&B AMP PRB: CPT

## 2021-12-30 PROCEDURE — C9113 INJ PANTOPRAZOLE SODIUM, VIA: HCPCS | Performed by: STUDENT IN AN ORGANIZED HEALTH CARE EDUCATION/TRAINING PROGRAM

## 2021-12-30 PROCEDURE — 96367 TX/PROPH/DG ADDL SEQ IV INF: CPT

## 2021-12-30 RX ORDER — SODIUM CHLORIDE 9 MG/ML
10 INJECTION INTRAVENOUS DAILY
Status: DISCONTINUED | OUTPATIENT
Start: 2021-12-31 | End: 2022-01-06 | Stop reason: HOSPADM

## 2021-12-30 RX ORDER — DEXTROSE MONOHYDRATE 50 MG/ML
100 INJECTION, SOLUTION INTRAVENOUS PRN
Status: DISCONTINUED | OUTPATIENT
Start: 2021-12-30 | End: 2022-01-06 | Stop reason: HOSPADM

## 2021-12-30 RX ORDER — SODIUM CHLORIDE 0.9 % (FLUSH) 0.9 %
5-40 SYRINGE (ML) INJECTION EVERY 12 HOURS SCHEDULED
Status: DISCONTINUED | OUTPATIENT
Start: 2021-12-30 | End: 2022-01-06 | Stop reason: HOSPADM

## 2021-12-30 RX ORDER — POLYETHYLENE GLYCOL 3350 17 G/17G
17 POWDER, FOR SOLUTION ORAL DAILY PRN
Status: DISCONTINUED | OUTPATIENT
Start: 2021-12-30 | End: 2022-01-06 | Stop reason: HOSPADM

## 2021-12-30 RX ORDER — NICOTINE POLACRILEX 4 MG
15 LOZENGE BUCCAL PRN
Status: DISCONTINUED | OUTPATIENT
Start: 2021-12-30 | End: 2022-01-06 | Stop reason: HOSPADM

## 2021-12-30 RX ORDER — PANTOPRAZOLE SODIUM 40 MG/10ML
40 INJECTION, POWDER, LYOPHILIZED, FOR SOLUTION INTRAVENOUS DAILY
Status: DISCONTINUED | OUTPATIENT
Start: 2021-12-31 | End: 2022-01-06 | Stop reason: HOSPADM

## 2021-12-30 RX ORDER — DEXTROSE MONOHYDRATE 25 G/50ML
12.5 INJECTION, SOLUTION INTRAVENOUS PRN
Status: DISCONTINUED | OUTPATIENT
Start: 2021-12-30 | End: 2022-01-06 | Stop reason: HOSPADM

## 2021-12-30 RX ORDER — INSULIN GLARGINE 100 [IU]/ML
10 INJECTION, SOLUTION SUBCUTANEOUS NIGHTLY
Status: DISCONTINUED | OUTPATIENT
Start: 2021-12-30 | End: 2022-01-06 | Stop reason: HOSPADM

## 2021-12-30 RX ORDER — POTASSIUM CHLORIDE 7.45 MG/ML
10 INJECTION INTRAVENOUS
Status: COMPLETED | OUTPATIENT
Start: 2021-12-30 | End: 2021-12-31

## 2021-12-30 RX ORDER — SODIUM CHLORIDE 9 MG/ML
25 INJECTION, SOLUTION INTRAVENOUS PRN
Status: DISCONTINUED | OUTPATIENT
Start: 2021-12-30 | End: 2021-12-31

## 2021-12-30 RX ORDER — MAGNESIUM SULFATE 1 G/100ML
1000 INJECTION INTRAVENOUS ONCE
Status: COMPLETED | OUTPATIENT
Start: 2021-12-30 | End: 2021-12-30

## 2021-12-30 RX ORDER — ACETAMINOPHEN 500 MG
1000 TABLET ORAL ONCE
Status: COMPLETED | OUTPATIENT
Start: 2021-12-30 | End: 2021-12-30

## 2021-12-30 RX ORDER — ACETAMINOPHEN 325 MG/1
650 TABLET ORAL EVERY 6 HOURS PRN
Status: DISCONTINUED | OUTPATIENT
Start: 2021-12-30 | End: 2022-01-06 | Stop reason: HOSPADM

## 2021-12-30 RX ORDER — ACETAMINOPHEN 650 MG/1
650 SUPPOSITORY RECTAL EVERY 6 HOURS PRN
Status: DISCONTINUED | OUTPATIENT
Start: 2021-12-30 | End: 2022-01-06 | Stop reason: HOSPADM

## 2021-12-30 RX ORDER — SODIUM CHLORIDE 0.9 % (FLUSH) 0.9 %
5-40 SYRINGE (ML) INJECTION PRN
Status: DISCONTINUED | OUTPATIENT
Start: 2021-12-30 | End: 2022-01-06 | Stop reason: HOSPADM

## 2021-12-30 RX ORDER — ONDANSETRON 2 MG/ML
4 INJECTION INTRAMUSCULAR; INTRAVENOUS EVERY 6 HOURS PRN
Status: DISCONTINUED | OUTPATIENT
Start: 2021-12-30 | End: 2022-01-06 | Stop reason: HOSPADM

## 2021-12-30 RX ORDER — PANTOPRAZOLE SODIUM 40 MG/10ML
40 INJECTION, POWDER, LYOPHILIZED, FOR SOLUTION INTRAVENOUS ONCE
Status: COMPLETED | OUTPATIENT
Start: 2021-12-30 | End: 2021-12-30

## 2021-12-30 RX ORDER — 0.9 % SODIUM CHLORIDE 0.9 %
1000 INTRAVENOUS SOLUTION INTRAVENOUS ONCE
Status: COMPLETED | OUTPATIENT
Start: 2021-12-30 | End: 2021-12-30

## 2021-12-30 RX ORDER — ONDANSETRON 2 MG/ML
4 INJECTION INTRAMUSCULAR; INTRAVENOUS EVERY 30 MIN PRN
Status: DISCONTINUED | OUTPATIENT
Start: 2021-12-30 | End: 2021-12-30 | Stop reason: SDUPTHER

## 2021-12-30 RX ORDER — ONDANSETRON 4 MG/1
4 TABLET, ORALLY DISINTEGRATING ORAL EVERY 8 HOURS PRN
Status: DISCONTINUED | OUTPATIENT
Start: 2021-12-30 | End: 2022-01-06 | Stop reason: HOSPADM

## 2021-12-30 RX ORDER — POTASSIUM CHLORIDE 7.45 MG/ML
10 INJECTION INTRAVENOUS PRN
Status: DISCONTINUED | OUTPATIENT
Start: 2021-12-30 | End: 2022-01-06 | Stop reason: HOSPADM

## 2021-12-30 RX ORDER — SODIUM CHLORIDE 9 MG/ML
INJECTION, SOLUTION INTRAVENOUS CONTINUOUS
Status: DISCONTINUED | OUTPATIENT
Start: 2021-12-30 | End: 2022-01-01

## 2021-12-30 RX ADMIN — POTASSIUM CHLORIDE 10 MEQ: 7.46 INJECTION, SOLUTION INTRAVENOUS at 23:20

## 2021-12-30 RX ADMIN — ONDANSETRON HYDROCHLORIDE 4 MG: 2 INJECTION, SOLUTION INTRAMUSCULAR; INTRAVENOUS at 17:31

## 2021-12-30 RX ADMIN — SODIUM CHLORIDE: 9 INJECTION, SOLUTION INTRAVENOUS at 23:40

## 2021-12-30 RX ADMIN — PANTOPRAZOLE SODIUM 40 MG: 40 INJECTION, POWDER, FOR SOLUTION INTRAVENOUS at 21:40

## 2021-12-30 RX ADMIN — POTASSIUM CHLORIDE 10 MEQ: 7.46 INJECTION, SOLUTION INTRAVENOUS at 20:00

## 2021-12-30 RX ADMIN — MAGNESIUM SULFATE HEPTAHYDRATE 1000 MG: 1 INJECTION, SOLUTION INTRAVENOUS at 20:01

## 2021-12-30 RX ADMIN — SODIUM CHLORIDE 1000 ML: 9 INJECTION, SOLUTION INTRAVENOUS at 19:58

## 2021-12-30 RX ADMIN — ACETAMINOPHEN 1000 MG: 500 TABLET ORAL at 21:40

## 2021-12-30 RX ADMIN — POTASSIUM CHLORIDE 10 MEQ: 7.46 INJECTION, SOLUTION INTRAVENOUS at 22:22

## 2021-12-30 RX ADMIN — SODIUM CHLORIDE, PRESERVATIVE FREE 10 ML: 5 INJECTION INTRAVENOUS at 23:41

## 2021-12-30 RX ADMIN — POTASSIUM CHLORIDE 10 MEQ: 7.46 INJECTION, SOLUTION INTRAVENOUS at 21:26

## 2021-12-30 ASSESSMENT — PAIN DESCRIPTION - PAIN TYPE: TYPE: ACUTE PAIN

## 2021-12-30 ASSESSMENT — PAIN SCALES - GENERAL
PAINLEVEL_OUTOF10: 6
PAINLEVEL_OUTOF10: 2

## 2021-12-30 ASSESSMENT — PAIN DESCRIPTION - PROGRESSION: CLINICAL_PROGRESSION: GRADUALLY WORSENING

## 2021-12-30 ASSESSMENT — PAIN DESCRIPTION - DESCRIPTORS: DESCRIPTORS: ACHING

## 2021-12-30 ASSESSMENT — PAIN DESCRIPTION - ONSET: ONSET: ON-GOING

## 2021-12-30 ASSESSMENT — PAIN DESCRIPTION - LOCATION: LOCATION: ABDOMEN

## 2021-12-30 ASSESSMENT — PAIN DESCRIPTION - FREQUENCY: FREQUENCY: CONTINUOUS

## 2021-12-30 ASSESSMENT — PAIN DESCRIPTION - ORIENTATION: ORIENTATION: LEFT;LOWER

## 2021-12-31 ENCOUNTER — ANESTHESIA EVENT (OUTPATIENT)
Dept: OPERATING ROOM | Age: 81
DRG: 330 | End: 2021-12-31
Payer: MEDICARE

## 2021-12-31 ENCOUNTER — ANESTHESIA (OUTPATIENT)
Dept: OPERATING ROOM | Age: 81
DRG: 330 | End: 2021-12-31
Payer: MEDICARE

## 2021-12-31 VITALS — DIASTOLIC BLOOD PRESSURE: 91 MMHG | OXYGEN SATURATION: 100 % | SYSTOLIC BLOOD PRESSURE: 160 MMHG

## 2021-12-31 PROBLEM — K21.00 GASTROESOPHAGEAL REFLUX DISEASE WITH ESOPHAGITIS: Status: ACTIVE | Noted: 2021-07-20

## 2021-12-31 LAB
ALBUMIN SERPL-MCNC: 2.9 G/DL (ref 3.4–5)
ALP BLD-CCNC: 82 U/L (ref 40–129)
ALT SERPL-CCNC: 6 U/L (ref 10–40)
ANION GAP SERPL CALCULATED.3IONS-SCNC: 9 MMOL/L (ref 3–16)
AST SERPL-CCNC: 15 U/L (ref 15–37)
BASOPHILS ABSOLUTE: 0 K/UL (ref 0–0.2)
BASOPHILS RELATIVE PERCENT: 0.4 %
BILIRUB SERPL-MCNC: 0.4 MG/DL (ref 0–1)
BILIRUBIN DIRECT: <0.2 MG/DL (ref 0–0.3)
BILIRUBIN, INDIRECT: ABNORMAL MG/DL (ref 0–1)
BUN BLDV-MCNC: 22 MG/DL (ref 7–20)
CALCIUM SERPL-MCNC: 8.2 MG/DL (ref 8.3–10.6)
CHLORIDE BLD-SCNC: 102 MMOL/L (ref 99–110)
CO2: 27 MMOL/L (ref 21–32)
CREAT SERPL-MCNC: 1 MG/DL (ref 0.6–1.2)
EKG ATRIAL RATE: 87 BPM
EKG DIAGNOSIS: NORMAL
EKG P AXIS: 32 DEGREES
EKG P-R INTERVAL: 176 MS
EKG Q-T INTERVAL: 408 MS
EKG QRS DURATION: 106 MS
EKG QTC CALCULATION (BAZETT): 490 MS
EKG R AXIS: -11 DEGREES
EKG T AXIS: 93 DEGREES
EKG VENTRICULAR RATE: 87 BPM
EOSINOPHILS ABSOLUTE: 0 K/UL (ref 0–0.6)
EOSINOPHILS RELATIVE PERCENT: 0.8 %
ESTIMATED AVERAGE GLUCOSE: 203 MG/DL
GFR AFRICAN AMERICAN: >60
GFR NON-AFRICAN AMERICAN: 53
GLUCOSE BLD-MCNC: 114 MG/DL (ref 70–99)
GLUCOSE BLD-MCNC: 124 MG/DL (ref 70–99)
GLUCOSE BLD-MCNC: 133 MG/DL (ref 70–99)
GLUCOSE BLD-MCNC: 60 MG/DL (ref 70–99)
GLUCOSE BLD-MCNC: 81 MG/DL
GLUCOSE BLD-MCNC: 81 MG/DL (ref 70–99)
GLUCOSE BLD-MCNC: 84 MG/DL (ref 70–99)
HBA1C MFR BLD: 8.7 %
HCT VFR BLD CALC: 31.9 % (ref 36–48)
HEMOGLOBIN: 10.7 G/DL (ref 12–16)
IRON SATURATION: 17 % (ref 15–50)
IRON: 33 UG/DL (ref 37–145)
LYMPHOCYTES ABSOLUTE: 1.3 K/UL (ref 1–5.1)
LYMPHOCYTES RELATIVE PERCENT: 19.2 %
MAGNESIUM: 2.3 MG/DL (ref 1.8–2.4)
MCH RBC QN AUTO: 29.9 PG (ref 26–34)
MCHC RBC AUTO-ENTMCNC: 33.5 G/DL (ref 31–36)
MCV RBC AUTO: 89.4 FL (ref 80–100)
MONOCYTES ABSOLUTE: 0.7 K/UL (ref 0–1.3)
MONOCYTES RELATIVE PERCENT: 10.3 %
NEUTROPHILS ABSOLUTE: 4.5 K/UL (ref 1.7–7.7)
NEUTROPHILS RELATIVE PERCENT: 69.3 %
PDW BLD-RTO: 13.9 % (ref 12.4–15.4)
PERFORMED ON: ABNORMAL
PERFORMED ON: NORMAL
PLATELET # BLD: 448 K/UL (ref 135–450)
PMV BLD AUTO: 6.4 FL (ref 5–10.5)
POTASSIUM REFLEX MAGNESIUM: 2.4 MMOL/L (ref 3.5–5.1)
POTASSIUM SERPL-SCNC: 3.1 MMOL/L (ref 3.5–5.1)
RBC # BLD: 3.57 M/UL (ref 4–5.2)
SODIUM BLD-SCNC: 138 MMOL/L (ref 136–145)
TOTAL IRON BINDING CAPACITY: 192 UG/DL (ref 260–445)
TOTAL PROTEIN: 6 G/DL (ref 6.4–8.2)
WBC # BLD: 6.5 K/UL (ref 4–11)

## 2021-12-31 PROCEDURE — 80048 BASIC METABOLIC PNL TOTAL CA: CPT

## 2021-12-31 PROCEDURE — 6360000002 HC RX W HCPCS: Performed by: ANESTHESIOLOGY

## 2021-12-31 PROCEDURE — C1892 INTRO/SHEATH,FIXED,PEEL-AWAY: HCPCS | Performed by: SURGERY

## 2021-12-31 PROCEDURE — 0DBW4ZX EXCISION OF PERITONEUM, PERCUTANEOUS ENDOSCOPIC APPROACH, DIAGNOSTIC: ICD-10-PCS | Performed by: SURGERY

## 2021-12-31 PROCEDURE — 84132 ASSAY OF SERUM POTASSIUM: CPT

## 2021-12-31 PROCEDURE — 85025 COMPLETE CBC W/AUTO DIFF WBC: CPT

## 2021-12-31 PROCEDURE — 0D1N0Z4 BYPASS SIGMOID COLON TO CUTANEOUS, OPEN APPROACH: ICD-10-PCS | Performed by: SURGERY

## 2021-12-31 PROCEDURE — 83550 IRON BINDING TEST: CPT

## 2021-12-31 PROCEDURE — 83735 ASSAY OF MAGNESIUM: CPT

## 2021-12-31 PROCEDURE — 2500000003 HC RX 250 WO HCPCS: Performed by: SURGERY

## 2021-12-31 PROCEDURE — 7100000001 HC PACU RECOVERY - ADDTL 15 MIN: Performed by: SURGERY

## 2021-12-31 PROCEDURE — 99221 1ST HOSP IP/OBS SF/LOW 40: CPT | Performed by: NURSE PRACTITIONER

## 2021-12-31 PROCEDURE — 1200000000 HC SEMI PRIVATE

## 2021-12-31 PROCEDURE — 3700000000 HC ANESTHESIA ATTENDED CARE: Performed by: SURGERY

## 2021-12-31 PROCEDURE — 6370000000 HC RX 637 (ALT 250 FOR IP): Performed by: SURGERY

## 2021-12-31 PROCEDURE — 2580000003 HC RX 258: Performed by: SURGERY

## 2021-12-31 PROCEDURE — 3700000001 HC ADD 15 MINUTES (ANESTHESIA): Performed by: SURGERY

## 2021-12-31 PROCEDURE — 44320 COLOSTOMY: CPT | Performed by: SURGERY

## 2021-12-31 PROCEDURE — 80076 HEPATIC FUNCTION PANEL: CPT

## 2021-12-31 PROCEDURE — 83540 ASSAY OF IRON: CPT

## 2021-12-31 PROCEDURE — 2500000003 HC RX 250 WO HCPCS: Performed by: HOSPITALIST

## 2021-12-31 PROCEDURE — 6370000000 HC RX 637 (ALT 250 FOR IP): Performed by: NURSE PRACTITIONER

## 2021-12-31 PROCEDURE — 6360000002 HC RX W HCPCS: Performed by: NURSE PRACTITIONER

## 2021-12-31 PROCEDURE — 2709999900 HC NON-CHARGEABLE SUPPLY: Performed by: SURGERY

## 2021-12-31 PROCEDURE — 6360000002 HC RX W HCPCS: Performed by: SURGERY

## 2021-12-31 PROCEDURE — 88342 IMHCHEM/IMCYTCHM 1ST ANTB: CPT

## 2021-12-31 PROCEDURE — 6370000000 HC RX 637 (ALT 250 FOR IP): Performed by: HOSPITALIST

## 2021-12-31 PROCEDURE — 3600000012 HC SURGERY LEVEL 2 ADDTL 15MIN: Performed by: SURGERY

## 2021-12-31 PROCEDURE — 7100000000 HC PACU RECOVERY - FIRST 15 MIN: Performed by: SURGERY

## 2021-12-31 PROCEDURE — 99222 1ST HOSP IP/OBS MODERATE 55: CPT | Performed by: SURGERY

## 2021-12-31 PROCEDURE — 93010 ELECTROCARDIOGRAM REPORT: CPT | Performed by: INTERNAL MEDICINE

## 2021-12-31 PROCEDURE — 88341 IMHCHEM/IMCYTCHM EA ADD ANTB: CPT

## 2021-12-31 PROCEDURE — C9113 INJ PANTOPRAZOLE SODIUM, VIA: HCPCS | Performed by: HOSPITALIST

## 2021-12-31 PROCEDURE — 2580000003 HC RX 258: Performed by: ANESTHESIOLOGY

## 2021-12-31 PROCEDURE — C2626 INFUSION PUMP, NON-PROG,TEMP: HCPCS | Performed by: SURGERY

## 2021-12-31 PROCEDURE — 88305 TISSUE EXAM BY PATHOLOGIST: CPT

## 2021-12-31 PROCEDURE — 2500000003 HC RX 250 WO HCPCS: Performed by: ANESTHESIOLOGY

## 2021-12-31 PROCEDURE — 3600000002 HC SURGERY LEVEL 2 BASE: Performed by: SURGERY

## 2021-12-31 PROCEDURE — 88313 SPECIAL STAINS GROUP 2: CPT

## 2021-12-31 PROCEDURE — 6360000002 HC RX W HCPCS: Performed by: HOSPITALIST

## 2021-12-31 RX ORDER — MEPERIDINE HYDROCHLORIDE 25 MG/ML
12.5 INJECTION INTRAMUSCULAR; INTRAVENOUS; SUBCUTANEOUS EVERY 5 MIN PRN
Status: DISCONTINUED | OUTPATIENT
Start: 2021-12-31 | End: 2021-12-31 | Stop reason: HOSPADM

## 2021-12-31 RX ORDER — SODIUM CHLORIDE 9 MG/ML
25 INJECTION, SOLUTION INTRAVENOUS PRN
Status: DISCONTINUED | OUTPATIENT
Start: 2021-12-31 | End: 2021-12-31 | Stop reason: HOSPADM

## 2021-12-31 RX ORDER — ONDANSETRON 2 MG/ML
INJECTION INTRAMUSCULAR; INTRAVENOUS PRN
Status: DISCONTINUED | OUTPATIENT
Start: 2021-12-31 | End: 2021-12-31 | Stop reason: SDUPTHER

## 2021-12-31 RX ORDER — DEXTROSE MONOHYDRATE 25 G/50ML
25 INJECTION, SOLUTION INTRAVENOUS PRN
Status: DISCONTINUED | OUTPATIENT
Start: 2021-12-31 | End: 2022-01-06 | Stop reason: HOSPADM

## 2021-12-31 RX ORDER — LEVOTHYROXINE SODIUM 0.05 MG/1
100 TABLET ORAL DAILY
Status: DISCONTINUED | OUTPATIENT
Start: 2022-01-01 | End: 2022-01-06 | Stop reason: HOSPADM

## 2021-12-31 RX ORDER — MORPHINE SULFATE 2 MG/ML
2 INJECTION, SOLUTION INTRAMUSCULAR; INTRAVENOUS EVERY 5 MIN PRN
Status: DISCONTINUED | OUTPATIENT
Start: 2021-12-31 | End: 2021-12-31 | Stop reason: HOSPADM

## 2021-12-31 RX ORDER — PROPOFOL 10 MG/ML
INJECTION, EMULSION INTRAVENOUS PRN
Status: DISCONTINUED | OUTPATIENT
Start: 2021-12-31 | End: 2021-12-31 | Stop reason: SDUPTHER

## 2021-12-31 RX ORDER — MORPHINE SULFATE 2 MG/ML
1 INJECTION, SOLUTION INTRAMUSCULAR; INTRAVENOUS EVERY 5 MIN PRN
Status: DISCONTINUED | OUTPATIENT
Start: 2021-12-31 | End: 2021-12-31 | Stop reason: HOSPADM

## 2021-12-31 RX ORDER — SUCCINYLCHOLINE CHLORIDE 20 MG/ML
INJECTION INTRAMUSCULAR; INTRAVENOUS PRN
Status: DISCONTINUED | OUTPATIENT
Start: 2021-12-31 | End: 2021-12-31 | Stop reason: SDUPTHER

## 2021-12-31 RX ORDER — SODIUM CHLORIDE 9 MG/ML
INJECTION, SOLUTION INTRAVENOUS CONTINUOUS PRN
Status: DISCONTINUED | OUTPATIENT
Start: 2021-12-31 | End: 2021-12-31 | Stop reason: SDUPTHER

## 2021-12-31 RX ORDER — ROCURONIUM BROMIDE 10 MG/ML
INJECTION, SOLUTION INTRAVENOUS PRN
Status: DISCONTINUED | OUTPATIENT
Start: 2021-12-31 | End: 2021-12-31 | Stop reason: SDUPTHER

## 2021-12-31 RX ORDER — POTASSIUM CHLORIDE 20 MEQ/1
40 TABLET, EXTENDED RELEASE ORAL ONCE
Status: COMPLETED | OUTPATIENT
Start: 2021-12-31 | End: 2021-12-31

## 2021-12-31 RX ORDER — OXYCODONE HYDROCHLORIDE AND ACETAMINOPHEN 5; 325 MG/1; MG/1
2 TABLET ORAL PRN
Status: DISCONTINUED | OUTPATIENT
Start: 2021-12-31 | End: 2021-12-31 | Stop reason: HOSPADM

## 2021-12-31 RX ORDER — MIDAZOLAM HYDROCHLORIDE 1 MG/ML
INJECTION INTRAMUSCULAR; INTRAVENOUS PRN
Status: DISCONTINUED | OUTPATIENT
Start: 2021-12-31 | End: 2021-12-31 | Stop reason: SDUPTHER

## 2021-12-31 RX ORDER — BUPIVACAINE HYDROCHLORIDE 5 MG/ML
INJECTION, SOLUTION PERINEURAL PRN
Status: DISCONTINUED | OUTPATIENT
Start: 2021-12-31 | End: 2021-12-31 | Stop reason: ALTCHOICE

## 2021-12-31 RX ORDER — ONDANSETRON 2 MG/ML
4 INJECTION INTRAMUSCULAR; INTRAVENOUS
Status: DISCONTINUED | OUTPATIENT
Start: 2021-12-31 | End: 2021-12-31 | Stop reason: HOSPADM

## 2021-12-31 RX ORDER — CEFAZOLIN SODIUM 1 G/3ML
INJECTION, POWDER, FOR SOLUTION INTRAMUSCULAR; INTRAVENOUS PRN
Status: DISCONTINUED | OUTPATIENT
Start: 2021-12-31 | End: 2021-12-31 | Stop reason: SDUPTHER

## 2021-12-31 RX ORDER — SODIUM CHLORIDE 0.9 % (FLUSH) 0.9 %
10 SYRINGE (ML) INJECTION EVERY 12 HOURS SCHEDULED
Status: DISCONTINUED | OUTPATIENT
Start: 2021-12-31 | End: 2021-12-31 | Stop reason: HOSPADM

## 2021-12-31 RX ORDER — SODIUM CHLORIDE, SODIUM LACTATE, POTASSIUM CHLORIDE, CALCIUM CHLORIDE 600; 310; 30; 20 MG/100ML; MG/100ML; MG/100ML; MG/100ML
INJECTION, SOLUTION INTRAVENOUS CONTINUOUS
Status: DISCONTINUED | OUTPATIENT
Start: 2021-12-31 | End: 2021-12-31

## 2021-12-31 RX ORDER — SODIUM CHLORIDE 0.9 % (FLUSH) 0.9 %
10 SYRINGE (ML) INJECTION PRN
Status: DISCONTINUED | OUTPATIENT
Start: 2021-12-31 | End: 2021-12-31 | Stop reason: HOSPADM

## 2021-12-31 RX ORDER — OXYCODONE HYDROCHLORIDE AND ACETAMINOPHEN 5; 325 MG/1; MG/1
1 TABLET ORAL PRN
Status: DISCONTINUED | OUTPATIENT
Start: 2021-12-31 | End: 2021-12-31 | Stop reason: HOSPADM

## 2021-12-31 RX ORDER — POTASSIUM CHLORIDE 7.45 MG/ML
10 INJECTION INTRAVENOUS
Status: DISPENSED | OUTPATIENT
Start: 2021-12-31 | End: 2021-12-31

## 2021-12-31 RX ORDER — FENTANYL CITRATE 50 UG/ML
INJECTION, SOLUTION INTRAMUSCULAR; INTRAVENOUS PRN
Status: DISCONTINUED | OUTPATIENT
Start: 2021-12-31 | End: 2021-12-31 | Stop reason: SDUPTHER

## 2021-12-31 RX ADMIN — ROCURONIUM BROMIDE 30 MG: 10 INJECTION, SOLUTION INTRAVENOUS at 12:26

## 2021-12-31 RX ADMIN — FAMOTIDINE 20 MG: 10 INJECTION, SOLUTION INTRAVENOUS at 12:11

## 2021-12-31 RX ADMIN — PROPOFOL 150 MG: 10 INJECTION, EMULSION INTRAVENOUS at 12:17

## 2021-12-31 RX ADMIN — POTASSIUM CHLORIDE 10 MEQ: 7.45 INJECTION INTRAVENOUS at 23:08

## 2021-12-31 RX ADMIN — SODIUM CHLORIDE: 9 INJECTION, SOLUTION INTRAVENOUS at 19:54

## 2021-12-31 RX ADMIN — MIDAZOLAM 1 MG: 1 INJECTION INTRAMUSCULAR; INTRAVENOUS at 12:12

## 2021-12-31 RX ADMIN — CEFAZOLIN 2000 MG: 330 INJECTION, POWDER, FOR SOLUTION INTRAMUSCULAR; INTRAVENOUS at 12:16

## 2021-12-31 RX ADMIN — SUGAMMADEX 200 MG: 100 INJECTION, SOLUTION INTRAVENOUS at 13:12

## 2021-12-31 RX ADMIN — PANTOPRAZOLE SODIUM 40 MG: 40 INJECTION, POWDER, FOR SOLUTION INTRAVENOUS at 07:52

## 2021-12-31 RX ADMIN — FENTANYL CITRATE 50 MCG: 50 INJECTION INTRAMUSCULAR; INTRAVENOUS at 13:13

## 2021-12-31 RX ADMIN — METRONIDAZOLE 500 MG: 500 INJECTION, SOLUTION INTRAVENOUS at 20:06

## 2021-12-31 RX ADMIN — DEXTROSE MONOHYDRATE 12.5 G: 25 INJECTION, SOLUTION INTRAVENOUS at 12:10

## 2021-12-31 RX ADMIN — SODIUM CHLORIDE: 9 INJECTION, SOLUTION INTRAVENOUS at 12:28

## 2021-12-31 RX ADMIN — METRONIDAZOLE 500 MG: 500 INJECTION, SOLUTION INTRAVENOUS at 12:05

## 2021-12-31 RX ADMIN — FENTANYL CITRATE 50 MCG: 50 INJECTION INTRAMUSCULAR; INTRAVENOUS at 12:34

## 2021-12-31 RX ADMIN — POTASSIUM CHLORIDE 40 MEQ: 1500 TABLET, EXTENDED RELEASE ORAL at 09:21

## 2021-12-31 RX ADMIN — ACETAMINOPHEN 650 MG: 325 TABLET ORAL at 05:26

## 2021-12-31 RX ADMIN — HYDROMORPHONE HYDROCHLORIDE 0.5 MG: 1 INJECTION, SOLUTION INTRAMUSCULAR; INTRAVENOUS; SUBCUTANEOUS at 14:55

## 2021-12-31 RX ADMIN — FENTANYL CITRATE 50 MCG: 50 INJECTION INTRAMUSCULAR; INTRAVENOUS at 12:17

## 2021-12-31 RX ADMIN — POTASSIUM CHLORIDE 10 MEQ: 7.45 INJECTION INTRAVENOUS at 23:58

## 2021-12-31 RX ADMIN — PHENYLEPHRINE HYDROCHLORIDE 100 MCG: 10 INJECTION INTRAVENOUS at 12:32

## 2021-12-31 RX ADMIN — HYDROMORPHONE HYDROCHLORIDE 0.5 MG: 1 INJECTION, SOLUTION INTRAMUSCULAR; INTRAVENOUS; SUBCUTANEOUS at 13:49

## 2021-12-31 RX ADMIN — Medication 2000 MG: at 20:08

## 2021-12-31 RX ADMIN — BUPIVACAINE HYDROCHLORIDE 270 ML: 5 INJECTION, SOLUTION EPIDURAL; INTRACAUDAL at 20:00

## 2021-12-31 RX ADMIN — Medication 2000 MG: at 12:02

## 2021-12-31 RX ADMIN — POTASSIUM CHLORIDE 10 MEQ: 7.45 INJECTION INTRAVENOUS at 07:55

## 2021-12-31 RX ADMIN — ONDANSETRON HYDROCHLORIDE 4 MG: 2 INJECTION, SOLUTION INTRAMUSCULAR; INTRAVENOUS at 12:23

## 2021-12-31 RX ADMIN — FENTANYL CITRATE 50 MCG: 50 INJECTION INTRAMUSCULAR; INTRAVENOUS at 12:55

## 2021-12-31 RX ADMIN — Medication 10 MEQ: at 10:32

## 2021-12-31 RX ADMIN — ROCURONIUM BROMIDE 10 MG: 10 INJECTION, SOLUTION INTRAVENOUS at 12:48

## 2021-12-31 RX ADMIN — SODIUM CHLORIDE, PRESERVATIVE FREE 10 ML: 5 INJECTION INTRAVENOUS at 20:06

## 2021-12-31 RX ADMIN — Medication 10 MEQ: at 09:20

## 2021-12-31 RX ADMIN — MIDAZOLAM 1 MG: 1 INJECTION INTRAMUSCULAR; INTRAVENOUS at 12:13

## 2021-12-31 RX ADMIN — INSULIN GLARGINE 10 UNITS: 100 INJECTION, SOLUTION SUBCUTANEOUS at 00:30

## 2021-12-31 RX ADMIN — HYDROMORPHONE HYDROCHLORIDE 0.5 MG: 1 INJECTION, SOLUTION INTRAMUSCULAR; INTRAVENOUS; SUBCUTANEOUS at 14:11

## 2021-12-31 RX ADMIN — SUCCINYLCHOLINE CHLORIDE 80 MG: 20 INJECTION, SOLUTION INTRAMUSCULAR; INTRAVENOUS at 12:17

## 2021-12-31 RX ADMIN — ONDANSETRON HYDROCHLORIDE 4 MG: 2 INJECTION, SOLUTION INTRAMUSCULAR; INTRAVENOUS at 05:27

## 2021-12-31 RX ADMIN — ACETAMINOPHEN 650 MG: 325 TABLET ORAL at 23:16

## 2021-12-31 RX ADMIN — ENOXAPARIN SODIUM 40 MG: 100 INJECTION SUBCUTANEOUS at 07:33

## 2021-12-31 ASSESSMENT — PULMONARY FUNCTION TESTS
PIF_VALUE: 17
PIF_VALUE: 17
PIF_VALUE: 2
PIF_VALUE: 17
PIF_VALUE: 17
PIF_VALUE: 19
PIF_VALUE: 17
PIF_VALUE: 18
PIF_VALUE: 17
PIF_VALUE: 7
PIF_VALUE: 17
PIF_VALUE: 17
PIF_VALUE: 19
PIF_VALUE: 17
PIF_VALUE: 40
PIF_VALUE: 17
PIF_VALUE: 17
PIF_VALUE: 18
PIF_VALUE: 6
PIF_VALUE: 17
PIF_VALUE: 17
PIF_VALUE: 18
PIF_VALUE: 18
PIF_VALUE: 1
PIF_VALUE: 18
PIF_VALUE: 17
PIF_VALUE: 19
PIF_VALUE: 19
PIF_VALUE: 2
PIF_VALUE: 17
PIF_VALUE: 19
PIF_VALUE: 18
PIF_VALUE: 17
PIF_VALUE: 19
PIF_VALUE: 17
PIF_VALUE: 17
PIF_VALUE: 19
PIF_VALUE: 17
PIF_VALUE: 0
PIF_VALUE: 17
PIF_VALUE: 19
PIF_VALUE: 17
PIF_VALUE: 18
PIF_VALUE: 17
PIF_VALUE: 1
PIF_VALUE: 19
PIF_VALUE: 17
PIF_VALUE: 1
PIF_VALUE: 17
PIF_VALUE: 17
PIF_VALUE: 1
PIF_VALUE: 20
PIF_VALUE: 24
PIF_VALUE: 19
PIF_VALUE: 19
PIF_VALUE: 17

## 2021-12-31 ASSESSMENT — PAIN SCALES - GENERAL
PAINLEVEL_OUTOF10: 7
PAINLEVEL_OUTOF10: 8
PAINLEVEL_OUTOF10: 4
PAINLEVEL_OUTOF10: 0
PAINLEVEL_OUTOF10: 7
PAINLEVEL_OUTOF10: 6
PAINLEVEL_OUTOF10: 3
PAINLEVEL_OUTOF10: 3

## 2021-12-31 ASSESSMENT — PAIN DESCRIPTION - FREQUENCY
FREQUENCY: CONTINUOUS
FREQUENCY: CONTINUOUS

## 2021-12-31 ASSESSMENT — PAIN DESCRIPTION - LOCATION
LOCATION: ABDOMEN
LOCATION: ABDOMEN

## 2021-12-31 ASSESSMENT — PAIN DESCRIPTION - ONSET
ONSET: ON-GOING
ONSET: GRADUAL

## 2021-12-31 ASSESSMENT — PAIN DESCRIPTION - DESCRIPTORS
DESCRIPTORS: ACHING
DESCRIPTORS: SORE

## 2021-12-31 ASSESSMENT — PAIN DESCRIPTION - ORIENTATION
ORIENTATION: LOWER
ORIENTATION: LOWER

## 2021-12-31 ASSESSMENT — PAIN DESCRIPTION - PROGRESSION: CLINICAL_PROGRESSION: NOT CHANGED

## 2021-12-31 ASSESSMENT — PAIN DESCRIPTION - PAIN TYPE
TYPE: ACUTE PAIN
TYPE: SURGICAL PAIN

## 2021-12-31 NOTE — PROGRESS NOTES
Patient transferred to overflow unit with 2 RN, report given to [facility] Remington Suarez

## 2021-12-31 NOTE — ANESTHESIA POSTPROCEDURE EVALUATION
Department of Anesthesiology  Postprocedure Note    Patient: Rae Mcdonald  MRN: [id number]  YOB: 1940  Date of evaluation: 12/31/2021  Time:  2:22 PM     Procedure Summary     Date: 12/31/21 Room / Location: [address] [place] [address] / [facility]    Anesthesia Start: 1213 Anesthesia Stop: 1326    Procedure: EXPLORATORY LAPAROTOMY, DIVERTING LOOP COLOSTOMY, PERITONEAL BIOPSY (N/A ) Diagnosis: (ABDOMINAL PAIN)    Surgeons: Kadie So MD Responsible Provider:     Anesthesia Type: general ASA Status: 3 - Emergent          Anesthesia Type: general    Lani Phase I: Lani Score: 3    Lani Phase II:      Last vitals: Reviewed and per EMR flowsheets.    Vitals:    12/31/21 1335 12/31/21 1340 12/31/21 1346 12/31/21 1400   BP: (!) 174/74 (!) 170/80 (!) 163/71 (!) 163/71   Pulse: 75 74 74 71   Resp: 20 22 18 17   Temp:       TempSrc:       SpO2: 96% 97% 97% 99%   Weight:       Height:           Anesthesia Post Evaluation    Patient location during evaluation: bedside  Patient participation: complete - patient participated  Level of consciousness: awake and alert  Airway patency: patent  Nausea & Vomiting: no nausea  Complications: no  Cardiovascular status: hemodynamically stable  Respiratory status: acceptable  Hydration status: euvolemic

## 2021-12-31 NOTE — ED PROVIDER NOTES
Social History     Socioeconomic History    Marital status:      Spouse name: Not on file    Number of children: Not on file    Years of education: Not on file    Highest education level: Not on file   Occupational History    Not on file   Tobacco Use    Smoking status: Never Smoker    Smokeless tobacco: Never Used   Vaping Use    Vaping Use: Never used   Substance and Sexual Activity    Alcohol use: No    Drug use: No    Sexual activity: Yes     Partners: Male   Other Topics Concern    Not on file   Social History Narrative    Not on file     Social Determinants of Health     Financial Resource Strain: Low Risk     Difficulty of Paying Living Expenses: Not hard at all   Food Insecurity: No Food Insecurity    Worried About [address] in the Last Year: Never true    [address] in the Last Year: Never true   Transportation Needs:     Lack of Transportation (Medical): Not on file    Lack of Transportation (Non-Medical):  Not on file   Physical Activity:     Days of Exercise per Week: Not on file    Minutes of Exercise per Session: Not on file   Stress:     Feeling of Stress : Not on file   Social Connections:     Frequency of Communication with Friends and Family: Not on file    Frequency of Social Gatherings with Friends and Family: Not on file    Attends Congregational Services: Not on file    Active Member of [address] [place] [address] [employer] or Organizations: Not on file    Attends Club or Organization Meetings: Not on file    Marital Status: Not on file   Intimate Partner Violence:     Fear of Current or Ex-Partner: Not on file    Emotionally Abused: Not on file    Physically Abused: Not on file    Sexually Abused: Not on file   Housing Stability:     Unable to Pay for Housing in the Last Year: Not on file    Number of [place] in the Last Year: Not on file    Unstable Housing in the Last Year: Not on file     Current Facility-Administered Medications   Medication Dose Route Frequency Provider Last Rate Last Admin    ondansetron [facility] injection 4 mg  4 mg IntraVENous Q30 Min PRN [place] Collar, DO   4 mg at 12/30/21 1731    potassium chloride 10 mEq/100 mL IVPB (Peripheral Line)  10 mEq IntraVENous Q1H [place] Collar, DO        magnesium sulfate 1000 mg in dextrose 5% 100 mL IVPB  1,000 mg IntraVENous Once Agapito Collar, DO        0.9 % sodium chloride bolus  1,000 mL IntraVENous Once [place]ington Collar, DO         Current Outpatient Medications   Medication Sig Dispense Refill    glipiZIDE (GLUCOTROL XL) 5 MG extended release tablet Take 1 tablet by mouth daily 30 tablet 1    LANTUS SOLOSTAR 100 UNIT/ML injection pen INJECT 10 UNITS INTO THE SKIN NIGHTLY 15 mL 0    omeprazole (PRILOSEC) 40 MG delayed release capsule TAKE 1 CAPSULE BY MOUTH EVERY MORNING (BEFORE BREAKFAST) 90 capsule 0    hydroCHLOROthiazide (HYDRODIURIL) 25 MG tablet TAKE 1 TABLET BY MOUTH DAILY 90 tablet 1    levothyroxine (SYNTHROID) 100 MCG tablet TAKE 1 TABLET BY MOUTH DAILY 90 tablet 1    Insulin Pen Needle ([employer] PEN NEEDLES 29G) 29G X 12MM MISC 1 each by Does not apply route daily 100 each 3    blood glucose test strips (ASCENSIA AUTODISC VI;ONE TOUCH ULTRA TEST VI) strip 1 each by In Vitro route daily As needed. 100 each 11    vitamin D 25 MCG (1000 UT) CAPS Take by mouth      aspirin 81 MG tablet Take 81 mg by mouth (Patient not taking: Reported on 9/21/2021)       Allergies   Allergen Reactions    Food Color Pink Anaphylaxis    Shellfish Allergy Anaphylaxis     Heart scan    Atorvastatin      Other reaction(s): Weakness  Leg weakness    Ramipril      Other reaction(s): Cough    Metformin Nausea And Vomiting       REVIEW OF SYSTEMS  10 systems reviewed, pertinent positives per HPI otherwise noted to be negative. PHYSICAL EXAM  /72   Pulse 87   Temp 98.3 °F (36.8 °C) (Oral)   Resp 24   Ht 5' 7\" (1.702 m)   Wt 165 lb (74.8 kg)   SpO2 95%   BMI 25.84 kg/m²    General: Appears well. Alert  HEENT: Head atraumatic, Eyes normal inspection, PERRL. Normal ENT inspection, Pharynx normal. No signs of dehydration  NECK: Normal inspection  RESPIRATORY: Normal breath sounds. No chest wall tenderness. No respiratory distress  CVS: Heart rate and rhythm regular. No Murmurs  ABDOMEN/GI: Soft, right upper quadrant tenderness, No distention  BACK: Normal inspection  EXTREMITIES: Non-Tender. Full ROM. Normal appearance. No Pedal edema  NEURO: Alert and oriented. Sensation normal. Motor normal  PSYCH: Mood normal. Affect normal.  SKIN: Color normal. No rash. Warm, Dry    LABS  I have reviewed all labs for this visit.    Results for orders placed or performed during the hospital encounter of 12/30/21   CBC Auto Differential   Result Value Ref Range    WBC 7.7 4.0 - 11.0 K/uL    RBC 4.01 4.00 - 5.20 M/uL    Hemoglobin 12.1 12.0 - 16.0 g/dL    Hematocrit 36.5 36.0 - 48.0 %    MCV 91.0 80.0 - 100.0 fL    MCH 30.2 26.0 - 34.0 pg    MCHC 33.2 31.0 - 36.0 g/dL    RDW 13.7 12.4 - 15.4 %    Platelets 810 (H) 808 - 450 K/uL    MPV 7.0 5.0 - 10.5 fL    Neutrophils % 76.8 %    Lymphocytes % 13.8 %    Monocytes % 9.0 %    Eosinophils % 0.1 %    Basophils % 0.3 %    Neutrophils Absolute 5.9 1.7 - 7.7 K/uL    Lymphocytes Absolute 1.1 1.0 - 5.1 K/uL    Monocytes Absolute 0.7 0.0 - 1.3 K/uL    Eosinophils Absolute 0.0 0.0 - 0.6 K/uL    Basophils Absolute 0.0 0.0 - 0.2 K/uL   Comprehensive Metabolic Panel w/ Reflex to MG   Result Value Ref Range    Sodium 137 136 - 145 mmol/L    Potassium reflex Magnesium 2.6 (LL) 3.5 - 5.1 mmol/L    Chloride 95 (L) 99 - 110 mmol/L    CO2 28 21 - 32 mmol/L    Anion Gap 14 3 - 16    Glucose 207 (H) 70 - 99 mg/dL    BUN 25 (H) 7 - 20 mg/dL    CREATININE 1.3 (H) 0.6 - 1.2 mg/dL    GFR Non-African American 39 (A) >60    GFR  48 (A) >60    Calcium 9.0 8.3 - 10.6 mg/dL    Total Protein 7.2 6.4 - 8.2 g/dL    Albumin 3.5 3.4 - 5.0 g/dL    Albumin/Globulin Ratio 0.9 (L) 1.1 - 2.2    Total Bilirubin 0.6 0.0 - 1.0 mg/dL    Alkaline Phosphatase 104 40 - 129 U/L    ALT 8 (L) 10 - 40 U/L    AST 15 15 - 37 U/L   Troponin   Result Value Ref Range    Troponin <0.01 <0.01 ng/mL   Lactate, Sepsis   Result Value Ref Range    Lactic Acid, Sepsis 1.7 0.4 - 1.9 mmol/L   Magnesium   Result Value Ref Range    Magnesium 2.10 1.80 - 2.40 mg/dL   EKG 12 Lead   Result Value Ref Range    Ventricular Rate 87 BPM    Atrial Rate 87 BPM    P-R Interval 176 ms    QRS Duration 106 ms    Q-T Interval 408 ms    QTc Calculation (Bazett) 490 ms    P Axis 32 degrees    R Axis -11 degrees    T Axis 93 degrees    Diagnosis       Normal sinus rhythmNonspecific T wave abnormalityProlonged QTAbnormal ECGNo previous ECGs available       ECG  The Ekg interpreted by me shows  Sinus rhythm with a rate of 87 bpm.  Left axis deviation. No acute injury pattern. , , QTc 490. RADIOLOGY  CT CHEST ABDOMEN PELVIS WO CONTRAST   Preliminary Result   1. Multiple bilateral pulmonary nodules most consistent with metastatic   disease. No acute pulmonary infiltrate. 2. Moderate distention of the colon with retained stool. Abrupt termination   of the stool column in the sigmoid colon, likely an underlying malignancy. Nodularity in the adjacent mesenteric and retroperitoneal fat consistent with   carcinomatosis. 3. No other evidence of abdominal or pelvic metastatic disease. Evaluation   is limited by the lack of intravenous contrast.   4. Cholelithiasis with no acute features. 5. Colonic diverticulosis with no evidence of diverticulitis. 6. Large hiatal hernia. Mural thickening of the esophagus suggesting a   reflux esophagitis. XR CHEST PORTABLE   Final Result   Multiple pulmonary nodules are observed throughout the lungs, new since 2014. There are also interstitial opacities bilaterally. Recommend a follow-up CT   of the chest for further evaluation.   An acute infectious or inflammatory   process may be considered, but malignancy cannot be excluded. ED COURSE/MDM  Patient seen and evaluated. Old records reviewed. Labs and imaging reviewed and results discussed with patient. Cardiac work-up obtained and reveals hypokalemia which is replaced in the ED. Chest x-ray does show multiple pulmonary nodules. In light of this and my concern regarding her abdominal pain, will obtain CT of the chest/abdomen/pelvis. Due to an RASHAWN with GFR of 39, will obtain imaging without contrast.  CT reveals what appears to be a mass in the colon with peritoneal carcinomatosis and multiple mets throughout the lungs. I did discuss these findings with the patient and due to concern for possible obstruction with this mass she will be admitted for further treatment evaluation. Discussed with Dr. Bridget Hewitt, who agreed to admit the patient to his service. During the patient's ED course, the patient was given:  Medications   ondansetron (ZOFRAN) injection 4 mg (4 mg IntraVENous Given 12/30/21 1731)   potassium chloride 10 mEq/100 mL IVPB (Peripheral Line) (has no administration in time range)   magnesium sulfate 1000 mg in dextrose 5% 100 mL IVPB (has no administration in time range)   0.9 % sodium chloride bolus (has no administration in time range)        CLINICAL IMPRESSION  1. SBO (small bowel obstruction) ([address]    2. Hypokalemia    3. Carcinomatosis ([address]        Blood pressure 127/72, pulse 87, temperature 98.3 °F (36.8 °C), temperature source Oral, resp. rate 24, height 5' 7\" (1.702 m), weight 165 lb (74.8 kg), SpO2 95 %. Kristal Archibald was admitted in stable condition. Patient was given scripts for the following medications. I counseled patient how to take these medications. New Prescriptions    No medications on file       Follow-up with:  No follow-up provider specified. DISCLAIMER: This chart was created using Dragon dictation software.   Efforts were made by me to ensure accuracy, however some errors may be present due to limitations of this technology and occasionally words are not transcribed correctly.        Ruddy Rosales DO  12/31/21 0008

## 2021-12-31 NOTE — CONSULTS
Department of General Surgery Consult    PATIENT NAME: Sunita Lin   YOB: 1940    ADMISSION DATE: 12/30/2021  4:50 PM      TODAY'S DATE: 12/31/2021    Reason for Consult: Colon obstruction    Chief Complaint: Abdominal pain    Requesting Physician:  Anant Taveras    HISTORY OF PRESENT ILLNESS:              The patient is a [de-identified] y.o. female who presents with abdominal pain and distention. She states she has been having intermittent abdominal pain with alternating diarrhea and constipation for the past month. It got worse the past week. She states she has become very bloated. She has had some nausea but no vomiting. She denies fever or chills. She has never had pain like this before.     Past Medical History:        Diagnosis Date    CKD (chronic kidney disease) stage 3, GFR 30-59 ml/min ([facility] 9/17/2019    Gastroesophageal reflux disease without esophagitis 7/20/2021    Hypertension     Hypothyroidism     Mixed hyperlipidemia     Obesity     Thyrotoxicosis     Type 2 diabetes mellitus without complication ([facility])     Type II or unspecified type diabetes mellitus without mention of complication, not stated as uncontrolled        Past Surgical History:        Procedure Laterality Date    ANKLE FRACTURE SURGERY Right 2007    CARPAL TUNNEL RELEASE Left 1998    CATARACT REMOVAL WITH IMPLANT Right 03/01/2018     PHACO EMULSIFICATION OF CATARACT WITH INTRA OCULAR LENS IMPLANT RIGHT EYE    CATARACT REMOVAL WITH IMPLANT Left 06/14/2018    HYSTERECTOMY      KNEE ARTHROSCOPY Right 2015       Current Medications:   Current Facility-Administered Medications: insulin glargine (LANTUS) injection vial 10 Units, 10 Units, SubCUTAneous, Nightly  pantoprazole (PROTONIX) injection 40 mg, 40 mg, IntraVENous, Daily **AND** sodium chloride (PF) 0.9 % injection 10 mL, 10 mL, IntraVENous, Daily  glucose (GLUTOSE) 40 % oral gel 15 g, 15 g, Oral, PRN  dextrose 50 % IV solution, 12.5 g, IntraVENous, PRN  glucagon (rDNA) injection 1 mg, 1 mg, IntraMUSCular, PRN  dextrose 5 % solution, 100 mL/hr, IntraVENous, PRN  insulin lispro (HUMALOG) injection vial 0-6 Units, 0-6 Units, SubCUTAneous, Q4H  sodium chloride flush 0.9 % injection 5-40 mL, 5-40 mL, IntraVENous, 2 times per day  sodium chloride flush 0.9 % injection 5-40 mL, 5-40 mL, IntraVENous, PRN  0.9 % sodium chloride infusion, 25 mL, IntraVENous, PRN  enoxaparin (LOVENOX) injection 40 mg, 40 mg, SubCUTAneous, Daily  ondansetron (ZOFRAN-ODT) disintegrating tablet 4 mg, 4 mg, Oral, Q8H PRN **OR** ondansetron (ZOFRAN) injection 4 mg, 4 mg, IntraVENous, Q6H PRN  polyethylene glycol (GLYCOLAX) packet 17 g, 17 g, Oral, Daily PRN  acetaminophen (TYLENOL) tablet 650 mg, 650 mg, Oral, Q6H PRN **OR** acetaminophen (TYLENOL) suppository 650 mg, 650 mg, Rectal, Q6H PRN  0.9 % sodium chloride infusion, , IntraVENous, Continuous  potassium chloride 10 mEq/100 mL IVPB (Peripheral Line), 10 mEq, IntraVENous, PRN  Prior to Admission medications    Medication Sig Start Date End Date Taking?  Authorizing Provider   glipiZIDE (GLUCOTROL XL) 5 MG extended release tablet Take 1 tablet by mouth daily 12/2/21   PILY Frank CNP   LANTUS SOLOSTAR 100 UNIT/ML injection pen INJECT 10 UNITS INTO THE SKIN NIGHTLY 10/18/21   PILY Frank CNP   omeprazole (PRILOSEC) 40 MG delayed release capsule TAKE 1 CAPSULE BY MOUTH EVERY MORNING (BEFORE BREAKFAST) 10/18/21   PILY Frank CNP   hydroCHLOROthiazide (HYDRODIURIL) 25 MG tablet TAKE 1 TABLET BY MOUTH DAILY 7/28/21   PILY Frank CNP   levothyroxine (SYNTHROID) 100 MCG tablet TAKE 1 TABLET BY MOUTH DAILY 7/28/21   PILY Frank CNP   Insulin Pen Needle ([employer] PEN NEEDLES 29G) 29G X 12MM MISC 1 each by Does not apply route daily 7/21/21   PILY Frank CNP   blood glucose test strips (ASCENSIA AUTODISC VI;ONE TOUCH ULTRA TEST VI) strip 1 each by In Vitro route daily As needed. 7/21/21   Ashtyn Zamudio APRN - CNP   vitamin D 25 MCG (1000 UT) CAPS Take by mouth    Historical Provider, MD   aspirin 81 MG tablet Take 81 mg by mouth  Patient not taking: Reported on 9/21/2021 9/13/17   Historical Provider, MD        Allergies:  Food color pink, Shellfish allergy, Atorvastatin, Ramipril, and Metformin    Social History:   TOBACCO:   reports that she has never smoked. She has never used smokeless tobacco.  ETOH:   reports no history of alcohol use. DRUGS:   reports no history of drug use. Family History:        Problem Relation Age of Onset    Diabetes Sister     Heart Disease Sister     High Blood Pressure Sister     Diabetes Brother        REVIEW OF SYSTEMS:  CONSTITUTIONAL:  negative  HEENT:  negative  RESPIRATORY:  negative  CARDIOVASCULAR:  negative  GASTROINTESTINAL:  negative except for nausea, diarrhea, constipation, abdominal pain and abdominal distention  GENITOURINARY:  negative  HEMATOLOGIC/LYMPHATIC:  negative  NEUROLOGICAL:  Negative  * All other ROS reviewed and negative. PHYSICAL EXAM:  VITALS:  /60   Pulse 80   Temp 98.3 °F (36.8 °C) (Oral)   Resp 18   Ht 5' 7\" (1.702 m)   Wt 165 lb (74.8 kg)   SpO2 94%   BMI 25.84 kg/m²   24HR INTAKE/OUTPUT:    I/O last 3 completed shifts: In: 263.3 [IV Piggyback:263.3]  Out: -   No intake/output data recorded.       CONSTITUTIONAL:  alert, no apparent distress and normal weight  EYES:  PERRL, sclera clear  ENT:  Normocephalic,atraumatic, without obvious abnormality  NECK:  supple, symmetrical, trachea midline  LUNGS: Resp effort easy and unlabored, clear to auscultation  CARDIOVASCULAR:  NO JVD, regular rate and rhythm and no murmur noted  ABDOMEN:  normal bowel sounds, soft, distended, tenderness noted in the left lower quadrant, voluntary guarding absent, no masses palpated and hernia absent  MUSCULOSKELETAL: No clubbing or cyanosis, 1+ pitting edema lower extremities  NEUROLOGIC:  Mental Status Exam:  Level of Alertness:   awake  PSYCHIATRIC:   person, place, time  SKIN:  no bruising or bleeding, normal skin color, texture, turgor and no redness, warmth, or swelling    DATA:    CBC:   Recent Labs     12/30/21  1725 12/31/21  0631   WBC 7.7 6.5   HGB 12.1 10.7*   HCT 36.5 31.9*   * 448     BMP:    Recent Labs     12/30/21  1725 12/30/21  1725 12/30/21  2339 12/31/21  0532 12/31/21  0631     --   --   --  138   K 2.6*  --   --   --   --    CL 95*  --   --   --  102   CO2 28  --   --   --  27   BUN 25*  --   --   --  22*   CREATININE 1.3*  --   --   --  1.0   GLUCOSE 207*   < > 164 81 84    < > = values in this interval not displayed. Hepatic:   Recent Labs     12/30/21  1725 12/31/21  0631   AST 15 15   ALT 8* 6*   BILITOT 0.6 0.4   ALKPHOS 104 82     Mag:      Recent Labs     12/30/21  1725   MG 2.10      Phos:     Recent Labs     12/30/21  1725   PHOS 2.8      INR: No results for input(s): INR in the last 72 hours. Radiology Review: Images personally reviewed by me. CT shows obstructing colon mass with likely lung mets and peritoneal carcinomatosis      IMPRESSION/RECOMMENDATIONS:    Obstructing distal colon mass with signs concerning for peritoneal and distal metastatic disease. Given the obstruction, she needs surgery for possible resection but at least diversion to prevent perforation. Plan for exploratory laparotomy with possible bowel resection, possible ostomy. I did explain that the ostomy could possibly be permanent. I explained the procedure including risks, benefits, and alternatives. Questions were answered and the patient agrees to proceed.       Electronically signed by Meek Fraire MD     [address] [phone]

## 2021-12-31 NOTE — CARE COORDINATION
Case Management Assessment  Initial Evaluation      Patient Name: Sharyle Goltz  YOB: 1940  Diagnosis: Hypokalemia [E87.6]  SBO (small bowel obstruction) ([facility] [I69.941]  Carcinomatosis ([facility] [C80.0]  Date / Time: 12/30/2021  4:50 PM    Admission status/Date:  12/30/21  Chart Reviewed: Yes      Patient Interviewed: Yes   Family Interviewed:  No      Hospitalization in the last 30 days:  No      Health Care Decision Maker :  NONE    (CM - must 1st enter selection under Navigator - emergency contact- Health Care Decision Maker Relationship and pick relationship)   Who do you trust or have selected to make healthcare decisions for you    Current PCP: Evelia Luis MD    Financial  Medicare  Precert required for SNF : NA        3 night stay required - WAIVED    ADLS  Support Systems/Care Needs:    Transportation: self    Meal Preparation: self    Housing  Living Arrangements: Lives w/ handicapped son and provides his care  Steps: ramp  Intent for return to present living arrangements: Yes  Identified Issues: Likely will need rehab. OR today. Possible new ostomy. Pt's son is in hospital today needing respite placement (HS trach/vent and trach to trach collar during the day.)    [address] with [address] : No Agency:(Services)     Passport/Waiver : No  :                      Phone Number:    Passport/Waiver Services: NA          Durable Medical Equiptment   DME Provider: TYE  Equipment: [facility]YE  Walker___Cane___RTS___ BSC___Shower Chair___Hospital Bed___W/C____Other________  02 at ____Liter(s)---wears(frequency)_______ HHN ___ CPAP___ BiPap___   N/A____    Home O2 Use :  No      Community Service Affiliation  Dialysis:  No    · Agency:  · Location:  · Dialysis Schedule:  · Phone:   · Fax: Other Community Services: (ex:PT/OT,Mental Health,Wound Clinic, Cardio/Pul [address])    DISCHARGE PLAN: Explained Case Management role/services.  Chart reviewed. Met with pt and explained the role of the CM. Plan: TBD. She will go to OR today for possible colostomy. Likely will need rehab postop once recovered. Would want to go to the same facility as her son.  Referral called and faxed to [facility]

## 2021-12-31 NOTE — ANESTHESIA PRE PROCEDURE
Department of Anesthesiology  Preprocedure Note       Name:  Chasidy Schumacher   Age:  [de-identified] y.o.  :  1940                                          MRN:  [id number]         Date:  2021      Surgeon: Kelsea Wing):  Kacie Wing MD    Procedure: Procedure(s):  COLOSTOMY POSSIBLE COLECTOMY    Medications prior to admission:   Prior to Admission medications    Medication Sig Start Date End Date Taking? Authorizing Provider   glipiZIDE (GLUCOTROL XL) 5 MG extended release tablet Take 1 tablet by mouth daily 21   PILY Brooks CNP   LANTUS SOLOSTAR 100 UNIT/ML injection pen INJECT 10 UNITS INTO THE SKIN NIGHTLY 10/18/21   PILY Brooks CNP   omeprazole (PRILOSEC) 40 MG delayed release capsule TAKE 1 CAPSULE BY MOUTH EVERY MORNING (BEFORE BREAKFAST) 10/18/21   PILY Brooks CNP   hydroCHLOROthiazide (HYDRODIURIL) 25 MG tablet TAKE 1 TABLET BY MOUTH DAILY 21   PILY Brooks CNP   levothyroxine (SYNTHROID) 100 MCG tablet TAKE 1 TABLET BY MOUTH DAILY 21   PILY Brooks CNP   Insulin Pen Needle ([employer] PEN NEEDLES 29G) 29G X 12MM MISC 1 each by Does not apply route daily 21   PILY Brooks CNP   blood glucose test strips (ASCENSIA AUTODISC VI;ONE TOUCH ULTRA TEST VI) strip 1 each by In Vitro route daily As needed.  21   PILY Brooks CNP   vitamin D 25 MCG (1000 UT) CAPS Take by mouth    Historical Provider, MD   aspirin 81 MG tablet Take 81 mg by mouth  Patient not taking: Reported on 2021   Historical Provider, MD       Current medications:    Current Facility-Administered Medications   Medication Dose Route Frequency Provider Last Rate Last Admin    lactated ringers infusion   IntraVENous Continuous Ángel Valle MD        sodium chloride flush 0.9 % injection 10 mL  10 mL IntraVENous 2 times per day MD Wilner Krishnan chloride flush 0.9 % injection 10 mL  10 mL IntraVENous PRN Nini Lambert MD        0.9 % sodium chloride infusion  25 mL IntraVENous PRN Nini Lambert MD        famotidine (PEPCID) injection 20 mg  20 mg IntraVENous Once Nini Lambert MD        meperidine (DEMEROL) injection 12.5 mg  12.5 mg IntraVENous Q5 Min PRN Nini Lambert MD        HYDROmorphone (DILAUDID) injection 0.25 mg  0.25 mg IntraVENous Q5 Min PRN Nini Lambert MD        HYDROmorphone (DILAUDID) injection 0.5 mg  0.5 mg IntraVENous Q5 Min PRN Nini Lambert MD        morphine (PF) injection 1 mg  1 mg IntraVENous Q5 Min PRN Nini Lambert MD        morphine (PF) injection 2 mg  2 mg IntraVENous Q5 Min PRN Nini Lambert MD        oxyCODONE-acetaminophen (PERCOCET) 5-325 MG per tablet 1 tablet  1 tablet Oral PRN Nini Lambert MD        Or    oxyCODONE-acetaminophen (PERCOCET) 5-325 MG per tablet 2 tablet  2 tablet Oral PRN Nini Lambert MD        ondansetron [facility]) injection 4 mg  4 mg IntraVENous Once PRN Nini Lambert MD        ceFAZolin (ANCEF) 2000 mg in sterile water 20 mL IV syringe  2,000 mg IntraVENous Q8H Simba Mantilla MD        metronidazole (FLAGYL) 500 mg in NaCl 100 mL IVPB premix  500 mg IntraVENous Q8H Simba Mantilla MD        bupivacaine 0.5% (MARCAINE) elastomeric infusion 270 mL  270 mL Infiltration Continuous Simba Mantilla MD        insulin glargine (LANTUS) injection vial 10 Units  10 Units SubCUTAneous Nightly Kristy Alfaro MD   10 Units at 12/31/21 0030    pantoprazole (PROTONIX) injection 40 mg  40 mg IntraVENous Daily Kristy Alfaro MD   40 mg at 12/31/21 3910    And    sodium chloride (PF) 0.9 % injection 10 mL  10 mL IntraVENous Daily Kristy Alfaro MD        glucose (GLUTOSE) 40 % oral gel 15 g  15 g Oral PRN Kristy Alfaro MD        dextrose 50 % IV solution  12.5 g IntraVENous PRN Monique Rhoades MD        glucagon (rDNA) injection 1 mg  1 mg IntraMUSCular PRN Monique Rhoades MD        dextrose 5 % solution  100 mL/hr IntraVENous PRN Monique Rhoades MD        insulin lispro (HUMALOG) injection vial 0-6 Units  0-6 Units SubCUTAneous Q4H Monique Rhoades MD        sodium chloride flush 0.9 % injection 5-40 mL  5-40 mL IntraVENous 2 times per day Monique Rhoades MD   10 mL at 12/30/21 2341    sodium chloride flush 0.9 % injection 5-40 mL  5-40 mL IntraVENous PRN Monique Rhoades MD        0.9 % sodium chloride infusion  25 mL IntraVENous PRN Monique Rhoades MD        enoxaparin (LOVENOX) injection 40 mg  40 mg SubCUTAneous Daily Monique Rhoades MD   40 mg at 12/31/21 0733    ondansetron (ZOFRAN-ODT) disintegrating tablet 4 mg  4 mg Oral Q8H PRN Monique Rhoades MD        Or    ondansetron [facility]) injection 4 mg  4 mg IntraVENous Q6H PRN Monique Rhoades MD   4 mg at 12/31/21 0527    polyethylene glycol (GLYCOLAX) packet 17 g  17 g Oral Daily PRN Monique Rhoades MD        acetaminophen (TYLENOL) tablet 650 mg  650 mg Oral Q6H PRN Monique Rhoades MD   650 mg at 12/31/21 2124    Or    acetaminophen (TYLENOL) suppository 650 mg  650 mg Rectal Q6H PRN Monique Rhoades MD        0.9 % sodium chloride infusion   IntraVENous Continuous Monique Rhoades MD 75 mL/hr at 12/30/21 2340 New Bag at 12/30/21 2340    potassium chloride 10 mEq/100 mL IVPB (Peripheral Line)  10 mEq IntraVENous PRN Monique Rhoades  mL/hr at 12/31/21 0755 10 mEq at 12/31/21 0755       Allergies:     Allergies   Allergen Reactions    Food Color Pink Anaphylaxis    Shellfish Allergy Anaphylaxis     Heart scan    Atorvastatin      Other reaction(s): Weakness  Leg weakness    Ramipril      Other reaction(s): Cough    Metformin Nausea And Vomiting       Problem List:    Patient Active Problem List   Diagnosis Code    Type 2 diabetes mellitus with hyperglycemia ([address] E11.65    Essential hypertension I10    Hypothyroidism E03.9    Family history of early CAD Z80.55    Mixed hyperlipidemia E78.2    Precordial pain R07.2    Statin intolerance Z78.9    Thyrotoxicosis E05.90    Chronic pain of right knee M25.561, G89.29    Heart murmur R01.1    Vitamin D deficiency E55.9    Iron deficiency anemia D50.9    CKD (chronic kidney disease) stage 3, GFR 30-59 ml/min ([facility]) N18.30    Abnormal weight loss R63.4    Gastroesophageal reflux disease with esophagitis K21.00    SBO (small bowel obstruction) ([address] K56.609    Hypokalemia E87.6       Past Medical History:        Diagnosis Date    CKD (chronic kidney disease) stage 3, GFR 30-59 ml/min ([facility]) 9/17/2019    Gastroesophageal reflux disease without esophagitis 7/20/2021    Hypertension     Hypothyroidism     Mixed hyperlipidemia     Obesity     Thyrotoxicosis     Type 2 diabetes mellitus without complication ([facility])     Type II or unspecified type diabetes mellitus without mention of complication, not stated as uncontrolled        Past Surgical History:        Procedure Laterality Date    ANKLE FRACTURE SURGERY Right 2007    CARPAL TUNNEL RELEASE Left 1998    CATARACT REMOVAL WITH IMPLANT Right 03/01/2018     PHACO EMULSIFICATION OF CATARACT WITH INTRA OCULAR LENS IMPLANT RIGHT EYE    CATARACT REMOVAL WITH IMPLANT Left 06/14/2018    HYSTERECTOMY      KNEE ARTHROSCOPY Right 2015       Social History:    Social History     Tobacco Use    Smoking status: Never Smoker    Smokeless tobacco: Never Used   Substance Use Topics    Alcohol use:  No                                Counseling given: Not Answered      Vital Signs (Current):   Vitals:    12/31/21 0702 12/31/21 0801 12/31/21 0900 12/31/21 1000   BP: 126/60 118/65 127/88 (!) 127/59   Pulse: 80 79 80 75   Resp: 18 19 17 17   Temp:  98.6 °F (37 °C)     TempSrc:  Oral     SpO2:       Weight:       Height:                                                  BP Readings from Last 3 Encounters:   12/31/21 (!) 127/59   07/20/21 122/80   09/16/19 124/78       NPO Status: Time of last liquid consumption: 0800                        Time of last solid consumption: 1800                        Date of last liquid consumption: 12/31/21                        Date of last solid food consumption: 12/29/21    BMI:   Wt Readings from Last 3 Encounters:   12/30/21 165 lb (74.8 kg)   07/20/21 170 lb (77.1 kg)   09/16/19 199 lb (90.3 kg)     Body mass index is 25.84 kg/m².     CBC:   Lab Results   Component Value Date    WBC 6.5 12/31/2021    RBC 3.57 12/31/2021    HGB 10.7 12/31/2021    HCT 31.9 12/31/2021    MCV 89.4 12/31/2021    RDW 13.9 12/31/2021     12/31/2021       CMP:   Lab Results   Component Value Date     12/31/2021    K 2.4 12/31/2021     12/31/2021    CO2 27 12/31/2021    BUN 22 12/31/2021    CREATININE 1.0 12/31/2021    GFRAA >60 12/31/2021    AGRATIO 0.9 12/30/2021    LABGLOM 53 12/31/2021    LABGLOM 59 02/20/2013    GLUCOSE 84 12/31/2021    PROT 6.0 12/31/2021    PROT 7.3 02/20/2013    CALCIUM 8.2 12/31/2021    BILITOT 0.4 12/31/2021    ALKPHOS 82 12/31/2021    AST 15 12/31/2021    ALT 6 12/31/2021       POC Tests:   Recent Labs     12/31/21  0532   POCGLU 81       Coags: No results found for: PROTIME, INR, APTT    HCG (If Applicable): No results found for: PREGTESTUR, PREGSERUM, HCG, HCGQUANT     ABGs: No results found for: PHART, PO2ART, BIS9PBV, TWC1XEB, BEART, R5TGJXHL     Type & Screen (If Applicable):  No results found for: LABABO, LABRH    Drug/Infectious Status (If Applicable):  No results found for: HIV, HEPCAB    COVID-19 Screening (If Applicable):   Lab Results   Component Value Date    COVID19 NOT DETECTED 12/30/2021           Anesthesia Evaluation  Patient summary reviewed and Nursing notes reviewed no history of anesthetic complications:   Airway: Mallampati: II  TM distance: >3 FB   Neck ROM: limited  Mouth opening: > = 3 FB Dental:    (+) upper dentures and lower dentures Pulmonary:Negative Pulmonary ROS breath sounds clear to auscultation      (-) COPD, asthma, shortness of breath, recent URI, sleep apnea and not a current smoker          Patient did not smoke on day of surgery. Cardiovascular:    (+) hypertension:, valvular problems/murmurs (AORTIC SCLEROSIS W/O STEN): MR, murmur (1-2/6 SYST), hyperlipidemia    (-) pacemaker, CAD, CABG/stent, dysrhythmias,  angina,  CHF and no pulmonary hypertension    ECG reviewed  Rhythm: regular  Rate: normal  Echocardiogram reviewed         Beta Blocker:  Not on Beta Blocker         Neuro/Psych:   (+) neuromuscular disease (NEUROPATHY B GREAT TOES):,    (-) seizures, TIA, CVA and psychiatric history           GI/Hepatic/Renal:   (+) hiatal hernia, GERD:, renal disease (CKD3):,      (-) liver disease, bowel prep and no morbid obesity       Endo/Other:    (+) DiabetesType II DM, using insulin, hypothyroidism, blood dyscrasia: anemia, arthritis:., malignancy/cancer (SUSPECTED COLON W/ LUNG METS). Abdominal:       Abdomen: soft. Vascular: negative vascular ROS. Other Findings:           Anesthesia Plan      general     ASA 3 - emergent       Induction: intravenous. MIPS: Postoperative opioids intended and Prophylactic antiemetics administered. Anesthetic plan and risks discussed with patient. This pre-anesthesia assessment may be used as a history and physical.    DOS STAFF ADDENDUM:    Pt seen and examined, chart reviewed (including anesthesia, drug and allergy history). No interval changes to history and physical examination. Anesthetic plan, risks, benefits, alternatives, and personnel involved discussed with patient. Patient verbalized an understanding and agrees to proceed.       Brittny Whitney MD  December 31, 2021  12:01 PM      Brittny Whitney MD   12/31/2021

## 2021-12-31 NOTE — ED NOTES
Perfect Serve message sent to Dr. Jewel Boucher RN  12/30/21 7077    Dr. Belen Haney returned call to Dr. Latif Favorite.      Kari Cain RN  12/30/21 8341

## 2021-12-31 NOTE — BRIEF OP NOTE
Brief Postoperative Note      Patient:  Imani Menezes  YOB: 1940  MRN: [id number]    Date of Procedure: 12/31/2021    Pre-Op Diagnosis: OBSTRUCTING RECTOSIGMOID MASS    Post-Op Diagnosis: Same       Procedure(s):  EXPLORATORY LAPAROTOMY, DIVERTING LOOP COLOSTOMY, PERITONEAL BIOPSY    Surgeon(s):  Amaya Salazar MD    Assistant:  Surgical Assistant: Katlyn Billy    Anesthesia: General    Estimated Blood Loss (mL): less than 569     Complications: None    Specimens:   ID Type Source Tests Collected by Time Destination   A :  Tissue Tissue SURGICAL PATHOLOGY Amaya Salazar MD 12/31/2021 1243        Implants:  * No implants in log *      Drains:   Colostomy LLQ Loop (Active)   Stomal Appliance 1 piece 12/31/21 1324   Stoma  Assessment Red 12/31/21 1324       Urethral Catheter Non-latex 16 fr (Active)   $ Urethral catheter insertion Inserted for procedure 12/31/21 1324   Catheter Indications Perioperative use for selected surgical procedures 12/31/21 1324   Urine Color Yellow 12/31/21 1233       Findings: as above    Electronically signed by Nan Thompson MD on 12/31/2021 at 1:32 PM

## 2021-12-31 NOTE — PROGRESS NOTES
Surgery notified of pt bleeding through drsg, top and bottom of surgical drsg. Told to reinforce drsg, not to be changed.   Abd with tape applied to top and bottom of surgical abd drsg

## 2021-12-31 NOTE — H&P
Hospital Medicine History & Physical      PCP: Yvonne Cleveland APRN - CNP    Date of Admission: 12/30/2021    Date of Service: Pt seen/examined on 12/31/21     Chief Complaint:    Chief Complaint   Patient presents with    Abdominal Pain     Pt arrived via EMS c/o abdominal pain. Pt states her stools have been runny and she states that she feels a hard spot in her lower left abdomen. Rigid and tenderness noted per EMS         History Of Present Illness: The patient is a [de-identified] y.o. female past medical history chronic kidney disease, GERD, hypertension, hypothyroidism, hyperlipidemia, obesity, type 2 diabetes,  aortic valve sclerosis without stenosis who presents to [facility] with complaint of abdominal pain. History obtained from the patient and review of EMR. Patient stated she has had abdominal pain and intermittent diarrhea over the last month. Over the last week she has noted abdominal pain left lower quadrant as well as right upper quadrant. She has had some associated nausea over the last 2 days,  but no vomiting. Pt stated that she takes care of her son who is has a trach and is actually in the ED now and tested positive for COVID. Pt PCR tested negative. She denies any shortness of breath, chest pain, palpitations, dizziness, or orthopnea. Patient stated that she has been under anesthesia in the past with no complications. In ED she was noted to be hypokalemic. Chest x-ray shows multiple pulmonary nodules. 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 Patient admitted and general surgery consulted.     Past Medical History:        Diagnosis Date    CKD (chronic kidney disease) stage 3, GFR 30-59 ml/min ([facility]) 9/17/2019    Gastroesophageal reflux disease without esophagitis 7/20/2021    Hypertension     Hypothyroidism     Mixed hyperlipidemia     Obesity     Thyrotoxicosis     Type 2 diabetes mellitus without complication [facility] [address]     Type II or unspecified type diabetes mellitus without mention of complication, not stated as uncontrolled        Past Surgical History:        Procedure Laterality Date    ANKLE FRACTURE SURGERY Right 2007    CARPAL TUNNEL RELEASE Left 1998    CATARACT REMOVAL WITH IMPLANT Right 03/01/2018     PHACO EMULSIFICATION OF CATARACT WITH INTRA OCULAR LENS IMPLANT RIGHT EYE    CATARACT REMOVAL WITH IMPLANT Left 06/14/2018    HYSTERECTOMY      KNEE ARTHROSCOPY Right 2015       Medications Prior to Admission:    Prior to Admission medications    Medication Sig Start Date End Date Taking? Authorizing Provider   glipiZIDE (GLUCOTROL XL) 5 MG extended release tablet Take 1 tablet by mouth daily 12/2/21   PILY Xiong CNP   LANTUS SOLOSTAR 100 UNIT/ML injection pen INJECT 10 UNITS INTO THE SKIN NIGHTLY 10/18/21   PILY Xiong CNP   omeprazole (PRILOSEC) 40 MG delayed release capsule TAKE 1 CAPSULE BY MOUTH EVERY MORNING (BEFORE BREAKFAST) 10/18/21   PILY Xiong CNP   hydroCHLOROthiazide (HYDRODIURIL) 25 MG tablet TAKE 1 TABLET BY MOUTH DAILY 7/28/21   PILY Xiong CNP   levothyroxine (SYNTHROID) 100 MCG tablet TAKE 1 TABLET BY MOUTH DAILY 7/28/21   PILY Xiong CNP   Insulin Pen Needle ([employer] PEN NEEDLES 29G) 29G X 12MM MISC 1 each by Does not apply route daily 7/21/21   PILY Xiong CNP   blood glucose test strips (ASCENSIA AUTODISC VI;ONE TOUCH ULTRA TEST VI) strip 1 each by In Vitro route daily As needed. 7/21/21   PILY Xiong CNP   vitamin D 25 MCG (1000 UT) CAPS Take by mouth    Historical Provider, MD   aspirin 81 MG tablet Take 81 mg by mouth  Patient not taking: Reported on 9/21/2021 9/13/17   Historical Provider, MD       Allergies:  Food color pink, Shellfish allergy, Atorvastatin, Ramipril, and Metformin    Social History:  The patient currently lives home.     TOBACCO: reports that she has never smoked. She has never used smokeless tobacco.  ETOH:   reports no history of alcohol use. Family History:   Positive as follows:        Problem Relation Age of Onset    Diabetes Sister     Heart Disease Sister     High Blood Pressure Sister     Diabetes Brother        REVIEW OF SYSTEMS:       Constitutional: Negative for fever   HENT: Negative for sore throat   Eyes: Negative for redness   Respiratory: Negative  for dyspnea, cough   Cardiovascular: Negative for chest pain   Gastrointestinal: - vomiting. +nausea and diarrhea +abomdinal pain and distension   Genitourinary: Negative for hematuria   Musculoskeletal: Negative for arthralgias   Skin: Negative for rash   Neurological: Negative for syncope   Hematological: Negative for adenopathy   Psychiatric/Behavorial: Negative for anxiety    PHYSICAL EXAM:    /65   Pulse 79   Temp 98.6 °F (37 °C) (Oral)   Resp 19   Ht 5' 7\" (1.702 m)   Wt 165 lb (74.8 kg)   SpO2 94%   BMI 25.84 kg/m²     Gen: No distress. Alert. Eyes: PERRL. No sclera icterus. No conjunctival injection. ENT: No discharge. Pharynx clear. Neck: No JVD. Trachea midline. Resp: No accessory muscle use. No crackles. No wheezes. No rhonchi. CV: Regular rate. Regular rhythm. +2/6 TROY. No rub. Trace BLE edema. GI: mild TTP LLQ. +distended. No masses. No organomegaly. Normal bowel sounds. No hernia. Skin: Warm and dry. No nodule on exposed extremities. No rash on exposed extremities. M/S: No cyanosis. No joint deformity. No clubbing. Neuro: Awake. Grossly nonfocal    Psych: Oriented x 3. No anxiety or agitation.      CBC:   Recent Labs     12/30/21  1725 12/31/21  0631   WBC 7.7 6.5   HGB 12.1 10.7*   HCT 36.5 31.9*   MCV 91.0 89.4   * 448     BMP:   Recent Labs     12/30/21  1725 12/31/21  0631    138   K 2.6* 2.4*   CL 95* 102   CO2 28 27   PHOS 2.8  --    BUN 25* 22*   CREATININE 1.3* 1.0     LIVER PROFILE:   Recent Labs 12/30/21  1725 12/31/21  0631   AST 15 15   ALT 8* 6*   BILIDIR  --  <0.2   BILITOT 0.6 0.4   ALKPHOS 104 82       CARDIAC ENZYMES  Recent Labs     12/30/21  1725   TROPONINI <0.01         CULTURES   SARS-CoV-2 RNA, RT PCR NOT DETECTED      INFLUENZA A NOT DETECTED    INFLUENZA B NOT DETECTED         EKG:    Normal sinus rhythm  Nonspecific T wave abnormality  Prolonged     RADIOLOGY  CT CHEST ABDOMEN PELVIS WO CONTRAST   Final Result   1. Multiple bilateral pulmonary nodules most consistent with metastatic   disease. No acute pulmonary infiltrate. 2. Moderate distention of the colon with retained stool. Abrupt termination   of the stool column in the sigmoid colon, likely an underlying malignancy. Nodularity in the adjacent mesenteric and retroperitoneal fat consistent with   carcinomatosis. 3. No other evidence of abdominal or pelvic metastatic disease. Evaluation   is limited by the lack of intravenous contrast.   4. Cholelithiasis with no acute features. 5. Colonic diverticulosis with no evidence of diverticulitis. 6. Large hiatal hernia. Mural thickening of the esophagus suggesting a   reflux esophagitis. XR CHEST PORTABLE   Final Result   Multiple pulmonary nodules are observed throughout the lungs, new since 2014. There are also interstitial opacities bilaterally. Recommend a follow-up CT   of the chest for further evaluation. An acute infectious or inflammatory   process may be considered, but malignancy cannot be excluded. Echo 2/2020  Summary   Normal left ventricle size and systolic function with a visually estimated   ejection fraction of 55%. Mild septal hypertrophy. No regional wall motion abnormalities are seen. Normal left ventricular diastolic filling pressure. Aortic valve sclerosis without stenosis. Mild mitral regurgitation.    Systolic pulmonary artery pressure (SPAP) is normal and estimated at 25 mmHg   (right atrial pressure 8 mmHg). Active Problems:    Type 2 diabetes mellitus with hyperglycemia (HCC)    Hypothyroidism    Heart murmur    Iron deficiency anemia  Resolved Problems:    * No resolved hospital problems. *        ASSESSMENT/PLAN:  Abdominal Pain  N/V  Obstructin distal colon mass with possible metastatic disease  - CT as above  - General surgery consulted  - plan for exploratory laparotomy today with possible bowel resection, possible ostomy.  - NPO  - IVF  - Nausea and pain control   - patient medically cleared for surgery      Hypokalemia  - 2/2 nausea and diarrhea  - 2.4 today  - replace with 40 PO and 30 IV  - repeat BMP at 12    GERD   Large Hiatal Hernia  - CT showed possible reflux esphogitis  - continue IV PPI    Diabetes Mellitus type 2  -controlled  - NPO sliding scale  - Continue Lantus  - monitor blood sugars with NPO status    History of Iron deficiency anemia  - hemoglobin 10.7  - check iron studies  - patient is not on iron at home    HTN  - controlled holding HCTZ at this time  - monitor blood pressure      Hypothyroidism  - home dose of synthroid 100 mcg   - ok to start tomorrow      Aortic valve sclerosis without stenosis  - +murmur  - echo as above    #Prolonged QTc  - 490  - Avoid QT prolonging agents as able. DVT Prophylaxis: Lovenox   Diet: Diet NPO  Code Status: Full Code    Discussed code status and patient would like to remain a full code, she stated next of Kin would be Hilda Sol #[phone].       Seema Galvez, PILY - CNP 12/31/21

## 2021-12-31 NOTE — ED NOTES
Sent Dr. Jose Cantu perfect serve for Deepa Queen RN that pt's potassium 2.4     [facility]  12/31/21 [id number]

## 2021-12-31 NOTE — ED NOTES
Perfect Serve message sent to Dr. Vasques [place] for inpatient consult     Jose Francisco Hill RN  12/30/21 [id number]

## 2021-12-31 NOTE — PROGRESS NOTES
Dr Armand Garza called to see if pt needed telemetry or continuous pulse oximetry, pt stable, neither needed at this time

## 2022-01-01 LAB
ANION GAP SERPL CALCULATED.3IONS-SCNC: 12 MMOL/L (ref 3–16)
BASOPHILS ABSOLUTE: 0 K/UL (ref 0–0.2)
BASOPHILS RELATIVE PERCENT: 0.3 %
BUN BLDV-MCNC: 16 MG/DL (ref 7–20)
CALCIUM SERPL-MCNC: 7.7 MG/DL (ref 8.3–10.6)
CHLORIDE BLD-SCNC: 103 MMOL/L (ref 99–110)
CO2: 23 MMOL/L (ref 21–32)
CREAT SERPL-MCNC: 1 MG/DL (ref 0.6–1.2)
EOSINOPHILS ABSOLUTE: 0 K/UL (ref 0–0.6)
EOSINOPHILS RELATIVE PERCENT: 0.2 %
GFR AFRICAN AMERICAN: >60
GFR NON-AFRICAN AMERICAN: 53
GLUCOSE BLD-MCNC: 109 MG/DL (ref 70–99)
GLUCOSE BLD-MCNC: 114 MG/DL (ref 70–99)
GLUCOSE BLD-MCNC: 123 MG/DL (ref 70–99)
GLUCOSE BLD-MCNC: 137 MG/DL (ref 70–99)
GLUCOSE BLD-MCNC: 201 MG/DL (ref 70–99)
GLUCOSE BLD-MCNC: 94 MG/DL (ref 70–99)
GLUCOSE BLD-MCNC: 98 MG/DL (ref 70–99)
HCT VFR BLD CALC: 32.6 % (ref 36–48)
HEMOGLOBIN: 10.4 G/DL (ref 12–16)
LYMPHOCYTES ABSOLUTE: 1 K/UL (ref 1–5.1)
LYMPHOCYTES RELATIVE PERCENT: 14 %
MAGNESIUM: 1.8 MG/DL (ref 1.8–2.4)
MCH RBC QN AUTO: 30.1 PG (ref 26–34)
MCHC RBC AUTO-ENTMCNC: 32 G/DL (ref 31–36)
MCV RBC AUTO: 94.2 FL (ref 80–100)
MONOCYTES ABSOLUTE: 0.7 K/UL (ref 0–1.3)
MONOCYTES RELATIVE PERCENT: 9.7 %
NEUTROPHILS ABSOLUTE: 5.4 K/UL (ref 1.7–7.7)
NEUTROPHILS RELATIVE PERCENT: 75.8 %
PDW BLD-RTO: 13.7 % (ref 12.4–15.4)
PERFORMED ON: ABNORMAL
PERFORMED ON: NORMAL
PERFORMED ON: NORMAL
PLATELET # BLD: 405 K/UL (ref 135–450)
PMV BLD AUTO: 7 FL (ref 5–10.5)
POTASSIUM REFLEX MAGNESIUM: 3.3 MMOL/L (ref 3.5–5.1)
RBC # BLD: 3.47 M/UL (ref 4–5.2)
SODIUM BLD-SCNC: 138 MMOL/L (ref 136–145)
WBC # BLD: 7.2 K/UL (ref 4–11)

## 2022-01-01 PROCEDURE — 6370000000 HC RX 637 (ALT 250 FOR IP): Performed by: SURGERY

## 2022-01-01 PROCEDURE — 99024 POSTOP FOLLOW-UP VISIT: CPT | Performed by: SURGERY

## 2022-01-01 PROCEDURE — 2500000003 HC RX 250 WO HCPCS: Performed by: SURGERY

## 2022-01-01 PROCEDURE — 99231 SBSQ HOSP IP/OBS SF/LOW 25: CPT | Performed by: NURSE PRACTITIONER

## 2022-01-01 PROCEDURE — C9113 INJ PANTOPRAZOLE SODIUM, VIA: HCPCS | Performed by: SURGERY

## 2022-01-01 PROCEDURE — 85025 COMPLETE CBC W/AUTO DIFF WBC: CPT

## 2022-01-01 PROCEDURE — 6360000002 HC RX W HCPCS: Performed by: SURGERY

## 2022-01-01 PROCEDURE — 1200000000 HC SEMI PRIVATE

## 2022-01-01 PROCEDURE — 2580000003 HC RX 258: Performed by: SURGERY

## 2022-01-01 PROCEDURE — 80048 BASIC METABOLIC PNL TOTAL CA: CPT

## 2022-01-01 PROCEDURE — 83735 ASSAY OF MAGNESIUM: CPT

## 2022-01-01 PROCEDURE — 36415 COLL VENOUS BLD VENIPUNCTURE: CPT

## 2022-01-01 PROCEDURE — 6370000000 HC RX 637 (ALT 250 FOR IP): Performed by: NURSE PRACTITIONER

## 2022-01-01 RX ORDER — LEVOTHYROXINE SODIUM 0.05 MG/1
TABLET ORAL
Status: DISPENSED
Start: 2022-01-01 | End: 2022-01-01

## 2022-01-01 RX ADMIN — SODIUM CHLORIDE: 9 INJECTION, SOLUTION INTRAVENOUS at 09:51

## 2022-01-01 RX ADMIN — ENOXAPARIN SODIUM 40 MG: 100 INJECTION SUBCUTANEOUS at 09:35

## 2022-01-01 RX ADMIN — Medication 2000 MG: at 04:36

## 2022-01-01 RX ADMIN — PANTOPRAZOLE SODIUM 40 MG: 40 INJECTION, POWDER, FOR SOLUTION INTRAVENOUS at 09:35

## 2022-01-01 RX ADMIN — ACETAMINOPHEN 650 MG: 325 TABLET ORAL at 17:29

## 2022-01-01 RX ADMIN — METRONIDAZOLE 500 MG: 500 INJECTION, SOLUTION INTRAVENOUS at 04:36

## 2022-01-01 RX ADMIN — SODIUM CHLORIDE, PRESERVATIVE FREE 10 ML: 5 INJECTION INTRAVENOUS at 09:35

## 2022-01-01 RX ADMIN — POTASSIUM CHLORIDE 10 MEQ: 7.45 INJECTION INTRAVENOUS at 01:00

## 2022-01-01 RX ADMIN — LEVOTHYROXINE SODIUM 100 MCG: 50 TABLET ORAL at 07:07

## 2022-01-01 RX ADMIN — POTASSIUM CHLORIDE 10 MEQ: 7.45 INJECTION INTRAVENOUS at 02:02

## 2022-01-01 RX ADMIN — ACETAMINOPHEN 650 MG: 325 TABLET ORAL at 09:46

## 2022-01-01 RX ADMIN — SODIUM CHLORIDE, PRESERVATIVE FREE 10 ML: 5 INJECTION INTRAVENOUS at 22:08

## 2022-01-01 RX ADMIN — INSULIN GLARGINE 10 UNITS: 100 INJECTION, SOLUTION SUBCUTANEOUS at 22:08

## 2022-01-01 ASSESSMENT — PAIN SCALES - GENERAL
PAINLEVEL_OUTOF10: 7
PAINLEVEL_OUTOF10: 5

## 2022-01-01 NOTE — PROGRESS NOTES
Progress Note    Admit Date:  12/30/2021    The patient is a [de-identified] y.o. female past medical history chronic kidney disease, GERD, hypertension, hypothyroidism, hyperlipidemia, obesity, type 2 diabetes,  aortic valve sclerosis without stenosis who presents to [facility], [place] with complaint of abdominal pain. In ED she was noted to be hypokalemic. Chest x-ray shows multiple pulmonary nodules. 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 Patient admitted and general surgery consulted. Subjective:  Ms. Marie Ferrara feels okay. Wants catheter out. Pain controlled. Tolerating clear liquids. No nausea, vomiting. Objective:   Patient Vitals for the past 4 hrs:   BP Temp Temp src Pulse Resp SpO2   01/01/22 0745 120/62 98.3 °F (36.8 °C) Oral 82 18 93 %            Intake/Output Summary (Last 24 hours) at 1/1/2022 1119  Last data filed at 1/1/2022 0954  Gross per 24 hour   Intake 2631.45 ml   Output 2370 ml   Net 261.45 ml       Physical Exam:    Gen: No distress. Alert. Eyes: PERRL. No sclera icterus. No conjunctival injection. ENT: No discharge. Pharynx clear. Neck: No JVD. Trachea midline. Resp: No accessory muscle use. No crackles. No wheezes. No rhonchi. CV: Regular rate. Regular rhythm. +2/6 TROY. No rub. Trace BLE edema. GI: Tender, Non-distended. Active bowel sounds. No hernia. + colostomy, pain ball  Skin: Warm and dry. No nodule on exposed extremities. No rash on exposed extremities. M/S: No cyanosis. No joint deformity. No clubbing. Neuro: Awake. Grossly nonfocal    Psych: Oriented x 3. No anxiety or agitation.      Data:  CBC:   Recent Labs     12/30/21  1725 12/31/21  0631 01/01/22  0718   WBC 7.7 6.5 7.2   HGB 12.1 10.7* 10.4*   HCT 36.5 31.9* 32.6*   MCV 91.0 89.4 94.2   * 448 405     BMP:   Recent Labs     12/30/21  1725 12/30/21  1725 12/31/21  0631 12/31/21  2151 01/01/22  0718     --  138  --  138   K 2.6*   < > 2.4* 3.1* 3.3*   CL 95*  --  102  --  103   CO2 28  --  27  --  23   PHOS 2.8  --   --   --   --    BUN 25*  --  22*  --  16   CREATININE 1.3*  --  1.0  --  1.0    < > = values in this interval not displayed. LIVER PROFILE:   Recent Labs     12/30/21  1725 12/31/21  0631   AST 15 15   ALT 8* 6*   BILIDIR  --  <0.2   BILITOT 0.6 0.4   ALKPHOS 104 82     PT/INR: No results for input(s): PROTIME, INR in the last 72 hours. CULTURES  SARS-CoV-2 RNA, RT PCR NOT DETECTED        INFLUENZA A NOT DETECTED     INFLUENZA B NOT DETECTED           RADIOLOGY  CT CHEST ABDOMEN PELVIS WO CONTRAST   Final Result   1. Multiple bilateral pulmonary nodules most consistent with metastatic   disease. No acute pulmonary infiltrate. 2. Moderate distention of the colon with retained stool. Abrupt termination   of the stool column in the sigmoid colon, likely an underlying malignancy. Nodularity in the adjacent mesenteric and retroperitoneal fat consistent with   carcinomatosis. 3. No other evidence of abdominal or pelvic metastatic disease. Evaluation   is limited by the lack of intravenous contrast.   4. Cholelithiasis with no acute features. 5. Colonic diverticulosis with no evidence of diverticulitis. 6. Large hiatal hernia. Mural thickening of the esophagus suggesting a   reflux esophagitis. XR CHEST PORTABLE   Final Result   Multiple pulmonary nodules are observed throughout the lungs, new since 2014. There are also interstitial opacities bilaterally. Recommend a follow-up CT   of the chest for further evaluation. An acute infectious or inflammatory   process may be considered, but malignancy cannot be excluded.              Echo 2/2020  Summary   Normal left ventricle size and systolic function with a visually estimated   ejection fraction of 55%.   Mild septal hypertrophy.   No regional wall motion abnormalities are seen.   Normal left ventricular diastolic filling pressure.   Aortic valve sclerosis without stenosis.   Mild mitral regurgitation.   Systolic pulmonary artery pressure (SPAP) is normal and estimated at 25 mmHg   (right atrial pressure 8 mmHg). Procedures:  Exploratory laparotomy with diverting loop sigmoid colostomy  and peritoneal biopsy. Assessment/Plan:  Abdominal Pain  N/V  Obstructin distal colon mass with possible metastatic disease  - CT as above  - General surgery consulted  - S/p Exploratory laparotomy with diverting loop sigmoid colostomy  and peritoneal biopsy.  - NPO--> Clear liquids. - IVF  - Nausea and pain control      Hypokalemia  - 2/2 nausea and diarrhea  - 2.4  - replacing. Up to 3.3 today.      GERD   Large Hiatal Hernia  - CT showed possible reflux esphogitis  - continue IV PPI     Diabetes Mellitus type 2  -controlled  - Continue Lantus  - monitor blood sugars      History of Iron deficiency anemia  - hemoglobin 10.7  - check iron studies  - patient is not on iron at home     HTN  - controlled holding HCTZ at this time  - monitor blood pressure     Hypothyroidism  - home dose of synthroid 100 mcg       Aortic valve sclerosis without stenosis  - +murmur  - echo as above     #Prolonged QTc  - 490  - Avoid QT prolonging agents as able.        DVT Prophylaxis: Lovenox      Discussed code status and patient would like to remain a full code, she stated next of Kin would be Kathi Lexis #[phone]. Diet: ADULT DIET;  Clear Liquid  Code Status: Full Code    [facility]  1/1/2022

## 2022-01-01 NOTE — PLAN OF CARE
Problem: Discharge Planning:  Goal: Participates in care planning  Description: Participates in care planning  Outcome: Ongoing  Goal: Discharged to appropriate level of care  Description: Discharged to appropriate level of care  Outcome: Ongoing     Problem: Activity Intolerance:  Goal: Ability to tolerate increased activity will improve  Description: Ability to tolerate increased activity will improve  Outcome: Ongoing     Problem: Anxiety/Stress:  Goal: Level of anxiety will decrease  Description: Level of anxiety will decrease  Outcome: Ongoing     Problem:  Bowel Function - Altered:  Goal: Bowel elimination is within specified parameters  Description: Bowel elimination is within specified parameters  Outcome: Ongoing     Problem: Fluid Volume - Deficit:  Goal: Absence of fluid volume deficit signs and symptoms  Description: Absence of fluid volume deficit signs and symptoms  Outcome: Ongoing  Goal: Electrolytes within specified parameters  Description: Electrolytes within specified parameters  Outcome: Ongoing     Problem: Mental Status - Impaired:  Goal: Absence of continued neurological deterioration signs and symptoms  Description: Absence of continued neurological deterioration signs and symptoms  Outcome: Ongoing  Goal: Absence of physical injury  Description: Absence of physical injury  Outcome: Ongoing  Goal: Mental status will be restored to baseline  Description: Mental status will be restored to baseline  Outcome: Ongoing     Problem: Mobility - Impaired:  Goal: Mobility will improve to maximum level  Description: Mobility will improve to maximum level  Outcome: Ongoing     Problem: Nutrition Deficit:  Goal: Ability to achieve adequate nutritional intake will improve  Description: Ability to achieve adequate nutritional intake will improve  Outcome: Ongoing     Problem: Pain:  Goal: Pain level will decrease  Description: Pain level will decrease  Outcome: Ongoing  Goal: Ability to notify healthcare provider of pain before it becomes unmanageable or unbearable will improve  Description: Ability to notify healthcare provider of pain before it becomes unmanageable or unbearable will improve  Outcome: Ongoing  Goal: Control of acute pain  Description: Control of acute pain  Outcome: Ongoing  Goal: Control of chronic pain  Description: Control of chronic pain  Outcome: Ongoing     Problem: Serum Glucose Level - Abnormal:  Goal: Ability to maintain appropriate glucose levels will improve to within specified parameters  Description: Ability to maintain appropriate glucose levels will improve to within specified parameters  Outcome: Ongoing     Problem: Skin Integrity - Impaired:  Goal: Will show no infection signs and symptoms  Description: Will show no infection signs and symptoms  Outcome: Ongoing  Goal: Absence of new skin breakdown  Description: Absence of new skin breakdown  Outcome: Ongoing     Problem: Sleep Pattern Disturbance:  Goal: Appears well-rested  Description: Appears well-rested  Outcome: Ongoing

## 2022-01-01 NOTE — PROGRESS NOTES
Patient dressing no change, ostomy emptied, still putting out moderate amounts of stool, flatus noted in bag, patient denies needs for pain medication, tylenol effective, will continue to monitor, call light in reach, bed alarm on. Last bag of potassium infusing. AM labs ordered.

## 2022-01-01 NOTE — FLOWSHEET NOTE
12/31/21 2316   Vital Signs   Temp 98.1 °F (36.7 °C)   Temp Source Oral   Pulse 87   Heart Rate Source Monitor   Resp 16   BP (!) 150/69   BP Location Right upper arm   Patient Position Semi fowlers   Level of Consciousness Alert (0)   MEWS Score 1   Pain Assessment   Pain Level 3   Oxygen Therapy   SpO2 92 %   Pulse Oximeter Device Mode Intermittent   O2 Device None (Room air)   No further bleeding noted per abdominal dressing, VS stable, patient requests pain medication, did not want IV, wanted tylenol, tylenol given with sips of water, swallows well, output from ostomy remains high, soft. Patient denies needs, call light in reach. Potassium drawn late from prior to surgery, value was 3.1, potassium PRN replacement started.

## 2022-01-01 NOTE — OP NOTE
[address]                 [address] [address]                                OPERATIVE REPORT    PATIENT NAME: Orlin Gutierrez                    :        1940  MED REC NO:   [id number]                          ROOM:       [facility]  ACCOUNT NO:   [de-identified]                           ADMIT DATE: 2021  PROVIDER:     Mariangel Ya MD    DATE OF PROCEDURE:  2021    PREOPERATIVE DIAGNOSIS:  Obstructing rectosigmoid mass. POSTOPERATIVE DIAGNOSIS:  Obstructing rectosigmoid mass. PROCEDURE:  Exploratory laparotomy with diverting loop sigmoid colostomy  and peritoneal biopsy. ANESTHESIA:  General.    SURGEON:  Mariangel Ya MD    ESTIMATED BLOOD LOSS:  Less than 100 mL. INDICATIONS:  The patient is an 50-year-old woman who presented with  abdominal pain and distention. Imaging showed an obstructing  rectosigmoid mass with lung nodules suspicious for metastatic disease. She is brought for relief of the obstruction. OPERATIVE SUMMARY:  After preoperative evaluation, the patient was  brought in the operating suite and placed in a comfortable supine  position on the operating room table. Monitoring equipment was attached  and general anesthesia was induced. Her abdomen was sterilely prepped  and draped and a lower midline incision was made. The peritoneal cavity  was entered and the colon was seen to be markedly distended. There was  no sign of distention associated ischemia of the cecum. The sigmoid  colon was mobilized by incising the lateral peritoneal attachments. Pelvic visualization was difficult due to the massively distended  proximal bowel. However, I was able to palpate the mass. It was firm  and fixed in the pelvis. I attempted to get around it, but it  essentially filled the pelvis and could not be mobilized. There were  some adjacent peritoneal implants that I excised for pathology.   A site  was chosen for diverting colostomy and a disc of skin and subcutaneous  tissue was excised from the left lower quadrant. A cruciate incision  was made in the fascia and this was dilated up. The colon was delivered  through this. A small skin nick was created superior to the midline  incision. On-Q catheters were tunneled laterally on either side. The  fascia was closed with a running suture of #1 Prolene and the skin was  closed with staples. A longitudinal colotomy was made on the colostomy  and it was everted in a rosebud fashion with interrupted sutures of 3-0  Vicryl. A colostomy bag and dry dressing were applied. All sponge,  needle, and instrument counts were correct at the end of the case. The  patient tolerated the procedure well. She was taken to the recovery  area in stable condition.         Lianna Roger MD    D: 12/31/2021 14:05:04       T: 12/31/2021 14:08:41     [email or web address]  Job#: [id number]     Doc#: [id number]    CC:  aDny Dasilva CNP

## 2022-01-01 NOTE — PROGRESS NOTES
Patient has small brown stool from rectum, changed, repositions self, denies need for pain medication.

## 2022-01-01 NOTE — PROGRESS NOTES
[facility] GENERAL SURGERY    Surgery Progress Note           POD # 1    PATIENT NAME: Pedro Colin     TODAY'S DATE: 1/1/2022    INTERVAL HISTORY:    Pt  Resting comfortably, minimal pain, is thirsty but not really hungry. Ostomy functioning. OBJECTIVE:   VITALS:  /62   Pulse 82   Temp 98.3 °F (36.8 °C) (Oral)   Resp 18   Ht 5' 7\" (1.702 m)   Wt 165 lb (74.8 kg)   SpO2 93%   BMI 25.84 kg/m²     INTAKE/OUTPUT:    I/O last 3 completed shifts: In: 2631.5 [I.V.:1744; IV Piggyback:887.5]  Out: 2070 [Urine:595; Stool:1375; Blood:100]  I/O this shift:  In: -   Out: 200 [Stool:200]              CONSTITUTIONAL:  awake and alert  LUNGS:  clear to auscultation  ABDOMEN:   hypoactive bowel sounds, soft, non-distended,     INCISION: dry, healing, ostomy - copious loose brown stool in pouch    Data:  CBC: Recent Labs     12/30/21  1725 12/31/21  0631 01/01/22  0718   WBC 7.7 6.5 7.2   HGB 12.1 10.7* 10.4*   HCT 36.5 31.9* 32.6*   * 448 405     BMP:    Recent Labs     12/30/21  1725 12/30/21  1725 12/30/21  2339 12/31/21  0532 12/31/21  0631 12/31/21  2151 01/01/22  0718     --   --   --  138  --  138   K 2.6*   < >  --   --  2.4* 3.1* 3.3*   CL 95*  --   --   --  102  --  103   CO2 28  --   --   --  27  --  23   BUN 25*  --   --   --  22*  --  16   CREATININE 1.3*  --   --   --  1.0  --  1.0   GLUCOSE 207*  --    < > 81 84  --  109*    < > = values in this interval not displayed. Hepatic:   Recent Labs     12/30/21  1725 12/31/21  0631   AST 15 15   ALT 8* 6*   BILITOT 0.6 0.4   ALKPHOS 104 82     Mag:      Recent Labs     12/30/21  1725 12/31/21  0631 01/01/22  0718   MG 2.10 2.30 1.80      Phos:     Recent Labs     12/30/21  1725   PHOS 2.8      INR: No results for input(s): INR in the last 72 hours.       Radiology Review:       ASSESSMENT AND PLAN:  [de-identified] y.o. female status post exploratory laparotomy with diverting loop sigmoid colostomy and peritoneal biopsy   - GI - ok for clear liquids, will advance diet as tolerated   - Patients sanford catheter to remain in place after 1st post-operative day secondary to decreased functioning, pain and further need to accurately monitor output.    - pulmonary toilet, up to chair, PT/OT    - Prophy - cont Lovenox, Protonix    Await biopsy results to clarify stage of unresectable colorectal tumor           Electronically signed by Bar Chapa MD

## 2022-01-01 NOTE — FLOWSHEET NOTE
12/31/21 1930   Vital Signs   Temp 97 °F (36.1 °C)   Temp Source Oral   Pulse 83   Heart Rate Source Monitor   Resp 14   BP (!) 154/74   BP Location Right upper arm   Patient Position Semi fowlers   Level of Consciousness Alert (0)   MEWS Score 0   Oxygen Therapy   SpO2 93 %   O2 Device None (Room air)   VS complete, patient postop today, VS stable, abdominal dressing bloody, no new drainage on re-enforced ABD dressing, ostomy pouch full of loose stool, 400 ml emptied, bag leaking laterally opposite from incision, stool on gown and bed pad. Will replace bag. Patient alert and oriented, BS hypoactive. NPO, IVF started. Denies pain just states has a soreness, On-Q Pain Ball medication pump in place, explained pump to patient. Patient states her son has a urostomy and she has had experience changing his bag for 51 years, educated bags provided are a one piece bag, patient educated on size of stoma, site cleaned, new bag placed. Stoma beefy red and large. Other assessment, lung sounds clear on room air, no edema noted. Patient asking if she will need to have chemotherapy or radiation, advised to discuss with doctor. States that this is from a new diagnosis, they did do biopsy during surgery, patient educated it may take several days for those results to come back before the doctor knows a plan of care. Patient asking how soon the ostomy could be reversed, also advised to ask the doctor and that the biopsy results may be needed before a determination can be made. Call light in reach, denies further needs, will continue to monitor.

## 2022-01-01 NOTE — PLAN OF CARE
Problem:  Bowel Function - Altered:  Goal: Bowel elimination is within specified parameters  Description: Bowel elimination is within specified parameters  Outcome: Ongoing     Problem: Fluid Volume - Deficit:  Goal: Absence of fluid volume deficit signs and symptoms  Description: Absence of fluid volume deficit signs and symptoms  Outcome: Ongoing  Goal: Electrolytes within specified parameters  Description: Electrolytes within specified parameters  Outcome: Ongoing     Problem: Nutrition Deficit:  Goal: Ability to achieve adequate nutritional intake will improve  Description: Ability to achieve adequate nutritional intake will improve  Outcome: Ongoing     Problem: Pain:  Goal: Pain level will decrease  Description: Pain level will decrease  Outcome: Ongoing  Goal: Ability to notify healthcare provider of pain before it becomes unmanageable or unbearable will improve  Description: Ability to notify healthcare provider of pain before it becomes unmanageable or unbearable will improve  Outcome: Ongoing  Goal: Control of acute pain  Description: Control of acute pain  Outcome: Ongoing  Goal: Control of chronic pain  Description: Control of chronic pain  Outcome: Ongoing     Problem: Serum Glucose Level - Abnormal:  Goal: Ability to maintain appropriate glucose levels will improve to within specified parameters  Description: Ability to maintain appropriate glucose levels will improve to within specified parameters  Outcome: Ongoing     Problem: Skin Integrity - Impaired:  Goal: Will show no infection signs and symptoms  Description: Will show no infection signs and symptoms  Outcome: Ongoing  Goal: Absence of new skin breakdown  Description: Absence of new skin breakdown  Outcome: Ongoing     Problem: Sleep Pattern Disturbance:  Goal: Appears well-rested  Description: Appears well-rested  Outcome: Ongoing

## 2022-01-02 PROBLEM — E44.0 MODERATE PROTEIN-CALORIE MALNUTRITION (HCC): Status: ACTIVE | Noted: 2022-01-02

## 2022-01-02 LAB
ANION GAP SERPL CALCULATED.3IONS-SCNC: 9 MMOL/L (ref 3–16)
BASOPHILS ABSOLUTE: 0 K/UL (ref 0–0.2)
BASOPHILS RELATIVE PERCENT: 0.3 %
BUN BLDV-MCNC: 13 MG/DL (ref 7–20)
CALCIUM SERPL-MCNC: 7.7 MG/DL (ref 8.3–10.6)
CHLORIDE BLD-SCNC: 101 MMOL/L (ref 99–110)
CO2: 27 MMOL/L (ref 21–32)
CREAT SERPL-MCNC: 0.8 MG/DL (ref 0.6–1.2)
EOSINOPHILS ABSOLUTE: 0.1 K/UL (ref 0–0.6)
EOSINOPHILS RELATIVE PERCENT: 1.1 %
GFR AFRICAN AMERICAN: >60
GFR NON-AFRICAN AMERICAN: >60
GLUCOSE BLD-MCNC: 101 MG/DL (ref 70–99)
GLUCOSE BLD-MCNC: 171 MG/DL (ref 70–99)
GLUCOSE BLD-MCNC: 189 MG/DL (ref 70–99)
GLUCOSE BLD-MCNC: 227 MG/DL (ref 70–99)
GLUCOSE BLD-MCNC: 94 MG/DL (ref 70–99)
HCT VFR BLD CALC: 28.1 % (ref 36–48)
HEMOGLOBIN: 9.7 G/DL (ref 12–16)
LYMPHOCYTES ABSOLUTE: 1.1 K/UL (ref 1–5.1)
LYMPHOCYTES RELATIVE PERCENT: 14.9 %
MAGNESIUM: 1.6 MG/DL (ref 1.8–2.4)
MCH RBC QN AUTO: 30.6 PG (ref 26–34)
MCHC RBC AUTO-ENTMCNC: 34.4 G/DL (ref 31–36)
MCV RBC AUTO: 89.2 FL (ref 80–100)
MONOCYTES ABSOLUTE: 0.7 K/UL (ref 0–1.3)
MONOCYTES RELATIVE PERCENT: 10 %
NEUTROPHILS ABSOLUTE: 5.5 K/UL (ref 1.7–7.7)
NEUTROPHILS RELATIVE PERCENT: 73.7 %
PDW BLD-RTO: 13.9 % (ref 12.4–15.4)
PERFORMED ON: ABNORMAL
PERFORMED ON: NORMAL
PLATELET # BLD: 404 K/UL (ref 135–450)
PMV BLD AUTO: 6.5 FL (ref 5–10.5)
POTASSIUM REFLEX MAGNESIUM: 2.6 MMOL/L (ref 3.5–5.1)
RBC # BLD: 3.16 M/UL (ref 4–5.2)
SODIUM BLD-SCNC: 137 MMOL/L (ref 136–145)
WBC # BLD: 7.5 K/UL (ref 4–11)

## 2022-01-02 PROCEDURE — 2580000003 HC RX 258: Performed by: SURGERY

## 2022-01-02 PROCEDURE — 6370000000 HC RX 637 (ALT 250 FOR IP): Performed by: SURGERY

## 2022-01-02 PROCEDURE — 1200000000 HC SEMI PRIVATE

## 2022-01-02 PROCEDURE — 6360000002 HC RX W HCPCS: Performed by: NURSE PRACTITIONER

## 2022-01-02 PROCEDURE — C9113 INJ PANTOPRAZOLE SODIUM, VIA: HCPCS | Performed by: SURGERY

## 2022-01-02 PROCEDURE — 80048 BASIC METABOLIC PNL TOTAL CA: CPT

## 2022-01-02 PROCEDURE — 6370000000 HC RX 637 (ALT 250 FOR IP)

## 2022-01-02 PROCEDURE — 99231 SBSQ HOSP IP/OBS SF/LOW 25: CPT | Performed by: NURSE PRACTITIONER

## 2022-01-02 PROCEDURE — 6360000002 HC RX W HCPCS: Performed by: SURGERY

## 2022-01-02 PROCEDURE — 99024 POSTOP FOLLOW-UP VISIT: CPT | Performed by: SURGERY

## 2022-01-02 PROCEDURE — 97530 THERAPEUTIC ACTIVITIES: CPT

## 2022-01-02 PROCEDURE — 83735 ASSAY OF MAGNESIUM: CPT

## 2022-01-02 PROCEDURE — 97535 SELF CARE MNGMENT TRAINING: CPT

## 2022-01-02 PROCEDURE — 97161 PT EVAL LOW COMPLEX 20 MIN: CPT

## 2022-01-02 PROCEDURE — 85025 COMPLETE CBC W/AUTO DIFF WBC: CPT

## 2022-01-02 PROCEDURE — 97165 OT EVAL LOW COMPLEX 30 MIN: CPT

## 2022-01-02 PROCEDURE — 36415 COLL VENOUS BLD VENIPUNCTURE: CPT

## 2022-01-02 RX ORDER — LEVOTHYROXINE SODIUM 0.05 MG/1
TABLET ORAL
Status: COMPLETED
Start: 2022-01-02 | End: 2022-01-02

## 2022-01-02 RX ORDER — MAGNESIUM SULFATE IN WATER 40 MG/ML
2000 INJECTION, SOLUTION INTRAVENOUS ONCE
Status: COMPLETED | OUTPATIENT
Start: 2022-01-02 | End: 2022-01-02

## 2022-01-02 RX ADMIN — POTASSIUM CHLORIDE 10 MEQ: 7.45 INJECTION INTRAVENOUS at 16:38

## 2022-01-02 RX ADMIN — SODIUM CHLORIDE, PRESERVATIVE FREE 10 ML: 5 INJECTION INTRAVENOUS at 21:21

## 2022-01-02 RX ADMIN — INSULIN GLARGINE 10 UNITS: 100 INJECTION, SOLUTION SUBCUTANEOUS at 21:22

## 2022-01-02 RX ADMIN — SODIUM CHLORIDE, PRESERVATIVE FREE 10 ML: 5 INJECTION INTRAVENOUS at 10:07

## 2022-01-02 RX ADMIN — POTASSIUM CHLORIDE 10 MEQ: 7.45 INJECTION INTRAVENOUS at 21:45

## 2022-01-02 RX ADMIN — LEVOTHYROXINE SODIUM 100 MCG: 50 TABLET ORAL at 06:30

## 2022-01-02 RX ADMIN — ENOXAPARIN SODIUM 40 MG: 100 INJECTION SUBCUTANEOUS at 10:06

## 2022-01-02 RX ADMIN — PANTOPRAZOLE SODIUM 40 MG: 40 INJECTION, POWDER, FOR SOLUTION INTRAVENOUS at 10:07

## 2022-01-02 RX ADMIN — MAGNESIUM SULFATE HEPTAHYDRATE 2000 MG: 40 INJECTION, SOLUTION INTRAVENOUS at 12:00

## 2022-01-02 RX ADMIN — INSULIN LISPRO 1 UNITS: 100 INJECTION, SOLUTION INTRAVENOUS; SUBCUTANEOUS at 12:12

## 2022-01-02 RX ADMIN — POTASSIUM CHLORIDE 10 MEQ: 7.45 INJECTION INTRAVENOUS at 19:25

## 2022-01-02 RX ADMIN — POTASSIUM CHLORIDE 10 MEQ: 7.45 INJECTION INTRAVENOUS at 10:06

## 2022-01-02 RX ADMIN — POTASSIUM CHLORIDE 10 MEQ: 7.45 INJECTION INTRAVENOUS at 12:01

## 2022-01-02 RX ADMIN — POTASSIUM CHLORIDE 10 MEQ: 7.45 INJECTION INTRAVENOUS at 14:32

## 2022-01-02 RX ADMIN — INSULIN LISPRO 1 UNITS: 100 INJECTION, SOLUTION INTRAVENOUS; SUBCUTANEOUS at 16:35

## 2022-01-02 NOTE — PROGRESS NOTES
Inpatient Physical Therapy Evaluation and Treatment    Unit: Surge overflow  Date:  1/2/2022  Patient Name:    Trenton Noe  Admitting diagnosis:  Hypokalemia [E87.6]  SBO (small bowel obstruction) ([facility] [address] [K56.609]  Carcinomatosis ([facility] [C80.0]  Admit Date:  12/30/2021  Precautions/Restrictions/WB Status/ Lines/ Wounds/ Oxygen: Fall risk, Bed/chair alarm and Lines -IV, Tuttle catheter and colostomy,pain ball    Treatment Time:  8:15-8:50  Treatment Number:  1   Timed Code Treatment Minutes: 25 minutes  Total Treatment Minutes:  35  minutes    Patient Goals for Therapy: \" to go home \"          Discharge Recommendations: Home PRN assist and with home PT , assistance to care for son if son is at home  DME needs for discharge: may need RW       Therapy recommendation for EMS Transport: NA    Therapy recommendations for staff:   Assist of 1 with use of rolling walker (RW) and gait belt for all transfers to/from [facility]  to/from [facility]    History of Present Illness: ED note, 12/30/2021, [place]:   \" [de-identified] y.o. female  who presents to the ED complaining of abdominal pain and intermittent diarrhea and constipation for the last month.  Patient states this has been worsened over the last week, and particularly since yesterday.  She indicates her pain is in the left lower quadrant as well as in the right upper quadrant.  She does note that she has a knot in her stomach on the left side.  She does note that she has injected her Lantus in this area in the past.  She denies any fevers, chills.  She has had nausea for the last 2 days, but no vomiting.  \"      PMHx: CKD, gastroesophageal reflux disease without esophagitis, hypertension, hypothyroidism, mixed hyperlipidemia, obesity, thyrotoxicosis, type 2 DM      12/31   Exploratory laparotomy, diverting loop colostomy, peritoneal biopsy      \"In ED she was noted to be hypokalemic.  Chest x-ray shows multiple pulmonary nodules.  Abdominal CT showed a mass in the colon with peritoneal carcinomatosis multiple mets throughout the lung.  Patient admitted and general surgery consulted. \"     Home Health S4 Level Recommendation:  Level 3 Safety  AM-PAC Mobility Score    AM-PAC Inpatient Mobility Raw Score : 23     Shirley Colin scored a 19/24 on the AM-PAC short mobility form. Current research shows that an AM-PAC score of 18 or greater is typically associated with a discharge to the patient's home setting. If patient discharges prior to next session this note will serve as a discharge summary. Please see below for the latest assessment towards goals. Preadmission Environment    Pt. Lives with family ( son, patient is caregiver)  Home environment:  one story home  Steps to enter first floor: ramp  Steps to second floor: N/A  Bathroom: tub/shower unit and elevated toilet seat,no arms, shower chair with back  Equipment owned: [facility]), walker (Rollator) and wheelchair (manual w/c)    Preadmission Status:  Pt. Able to drive: Yes  Pt Fully independent with ADLs: Yes  Pt sleeps in flat bed  Pt. Required assistance from family for: Independent PTA   Provides total care to son, currently at [facility]   Pt. independent for transfers and gait and walked with No Device  History of falls No    Pain   No at rest    Cognition    A&O Person, Place, Time and Situation   Able to follow 2 step commands    Subjective  Patient lying supine in bed with no family present. Pt agreeable to this PT eval & tx. Upper Extremity ROM/Strength  Please see OT evaluation. Lower Extremity ROM / Strength   AROM WFL: Yes  ROM limitations:     WFL to complete bed mobility and transfers.     Lower Extremity Sensation    NT    Lower Extremity Proprioception:   WFL    Coordination and Tone  WFL    Balance  Sitting:  Normal; Independent  Comments:     Standing: Good ; CGA  Comments: used RW      Bed mobility:    Supine to sit:                           supervision  Sit to supine:                           Not Tested  Rolling: restrictive assistive device)  5). Tolerate B LE exercises 3 sets of 10-15 reps      Rehabilitation Potential: Good  Strengths for achieving goals include:   Pt motivated, PLOF and Pt cooperative   Barriers to achieving goals include:    No Barriers    Plan    To be seen 3-5 x / week  while in acute care setting for therapeutic exercises, bed mobility, transfers, progressive gait training, balance training, and family/patient education. Signature: Jessee Suarez, PT #[id number]     If patient discharges from this facility prior to next visit, this note will serve as the Discharge Summary.

## 2022-01-02 NOTE — PROGRESS NOTES
[facility] GENERAL SURGERY    Surgery Progress Note           POD # 2    PATIENT NAME: Bev Colin     TODAY'S DATE: 1/2/2022    INTERVAL HISTORY:    Pt  Is up in chair, good pain control, having ostomy output. Taking clears well, hungry for food. Tuttle just removed. OBJECTIVE:   VITALS:  /64   Pulse 78   Temp 97.9 °F (36.6 °C) (Oral)   Resp 16   Ht 5' 7\" (1.702 m)   Wt 165 lb (74.8 kg)   SpO2 96%   BMI 25.84 kg/m²     INTAKE/OUTPUT:    I/O last 3 completed shifts: In: 79 [P.O.:60; I.V.:10]  Out: 7919 [Urine:1075; Stool:700]  I/O this shift:  In: -   Out: 700 [Urine:700]              CONSTITUTIONAL:  awake and alert  LUNGS:  clear to auscultation  ABDOMEN:   hypoactive bowel sounds, soft, non-distended, non-tender   INCISION: clean, dry, no drainage, healing, staples intact. Ostomy pink w/ mild edema    Data:  CBC: Recent Labs     12/31/21  0631 01/01/22  0718 01/02/22  0556   WBC 6.5 7.2 7.5   HGB 10.7* 10.4* 9.7*   HCT 31.9* 32.6* 28.1*    405 404     BMP:    Recent Labs     12/31/21  0631 12/31/21  2151 01/01/22  0718 01/02/22  0556     --  138 137   K 2.4* 3.1* 3.3* 2.6*     --  103 101   CO2 27  --  23 27   BUN 22*  --  16 13   CREATININE 1.0  --  1.0 0.8   GLUCOSE 84  --  109* 101*     Hepatic:   Recent Labs     12/30/21  1725 12/31/21  0631   AST 15 15   ALT 8* 6*   BILITOT 0.6 0.4   ALKPHOS 104 82     Mag:      Recent Labs     12/31/21  0631 01/01/22  0718 01/02/22  0556   MG 2.30 1.80 1.60*      Phos:     Recent Labs     12/30/21  1725   PHOS 2.8      INR: No results for input(s): INR in the last 72 hours.       Radiology Review:       ASSESSMENT AND PLAN:  [de-identified] y.o. female status post ex lap, diverting loop transverse colostomy for obstructing distal sigmoid/rectal tumor   - advance to soft diet   - cont PT/OT   - replace Mg, K   - await Pathology results         Electronically signed by Yadira Mata MD

## 2022-01-02 NOTE — PROGRESS NOTES
Progress Note    Admit Date:  12/30/2021    The patient is a [de-identified] y.o. female past medical history chronic kidney disease, GERD, hypertension, hypothyroidism, hyperlipidemia, obesity, type 2 diabetes,  aortic valve sclerosis without stenosis who presents to [facility] with complaint of abdominal pain. In ED she was noted to be hypokalemic. Chest x-ray shows multiple pulmonary nodules. 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 Patient admitted and general surgery consulted. Subjective:  Ms. Mari Farr feels okay. Wants catheter out. Pain controlled. Tolerating clear liquids. No nausea, vomiting. Objective:   Patient Vitals for the past 4 hrs:   BP Temp Temp src Pulse Resp SpO2   01/02/22 0805 (!) 155/66 98.7 °F (37.1 °C) Oral 76 18 91 %            Intake/Output Summary (Last 24 hours) at 1/2/2022 1001  Last data filed at 1/2/2022 0933  Gross per 24 hour   Intake 70 ml   Output 2175 ml   Net -2105 ml       Physical Exam:    Gen: No distress. Alert. Eyes: PERRL. No sclera icterus. No conjunctival injection. ENT: No discharge. Pharynx clear. Neck: No JVD. Trachea midline. Resp: No accessory muscle use. No crackles. No wheezes. No rhonchi. CV: Regular rate. Regular rhythm. +2/6 TROY. No rub. Trace BLE edema. GI: Tender, Non-distended. Active bowel sounds. No hernia. + colostomy, pain ball  Skin: Warm and dry. Surgical incision mid abdomen, staples. Surgical dressing. M/S: No cyanosis. No joint deformity. No clubbing. Neuro: Awake. Grossly nonfocal    Psych: Oriented x 3. No anxiety or agitation.      Data:  CBC:   Recent Labs     12/31/21  0631 01/01/22  0718 01/02/22  0556   WBC 6.5 7.2 7.5   HGB 10.7* 10.4* 9.7*   HCT 31.9* 32.6* 28.1*   MCV 89.4 94.2 89.2    405 404     BMP:   Recent Labs     12/30/21  1725 12/30/21  1725 12/31/21  0631 12/31/21  2151 01/01/22  0718 01/02/22  0556      < > 138  --  138 137 K 2.6*   < > 2.4* 3.1* 3.3* 2.6*   CL 95*   < > 102  --  103 101   CO2 28   < > 27  --  23 27   PHOS 2.8  --   --   --   --   --    BUN 25*   < > 22*  --  16 13   CREATININE 1.3*   < > 1.0  --  1.0 0.8    < > = values in this interval not displayed. LIVER PROFILE:   Recent Labs     12/30/21  1725 12/31/21  0631   AST 15 15   ALT 8* 6*   BILIDIR  --  <0.2   BILITOT 0.6 0.4   ALKPHOS 104 82     PT/INR: No results for input(s): PROTIME, INR in the last 72 hours. CULTURES  SARS-CoV-2 RNA, RT PCR NOT DETECTED        INFLUENZA A NOT DETECTED     INFLUENZA B NOT DETECTED           RADIOLOGY  CT CHEST ABDOMEN PELVIS WO CONTRAST   Final Result   1. Multiple bilateral pulmonary nodules most consistent with metastatic   disease. No acute pulmonary infiltrate. 2. Moderate distention of the colon with retained stool. Abrupt termination   of the stool column in the sigmoid colon, likely an underlying malignancy. Nodularity in the adjacent mesenteric and retroperitoneal fat consistent with   carcinomatosis. 3. No other evidence of abdominal or pelvic metastatic disease. Evaluation   is limited by the lack of intravenous contrast.   4. Cholelithiasis with no acute features. 5. Colonic diverticulosis with no evidence of diverticulitis. 6. Large hiatal hernia. Mural thickening of the esophagus suggesting a   reflux esophagitis. XR CHEST PORTABLE   Final Result   Multiple pulmonary nodules are observed throughout the lungs, new since 2014. There are also interstitial opacities bilaterally. Recommend a follow-up CT   of the chest for further evaluation. An acute infectious or inflammatory   process may be considered, but malignancy cannot be excluded.              Echo 2/2020  Summary   Normal left ventricle size and systolic function with a visually estimated   ejection fraction of 55%.   Mild septal hypertrophy.   No regional wall motion abnormalities are seen.   Normal left ventricular diastolic filling pressure.   Aortic valve sclerosis without stenosis.   Mild mitral regurgitation.   Systolic pulmonary artery pressure (SPAP) is normal and estimated at 25 mmHg   (right atrial pressure 8 mmHg). Procedures:  Exploratory laparotomy with diverting loop sigmoid colostomy  and peritoneal biopsy. Assessment/Plan:  Abdominal Pain  N/V  Obstructin distal colon mass with possible metastatic disease  - CT as above  - General surgery consulted  - S/p Exploratory laparotomy with diverting loop sigmoid colostomy  and peritoneal biopsy.  - NPO--> Clear liquids. - IVF stopped. - Nausea and pain control   POD#2     Hypokalemia  Hypomagnesemia   - 2/2 nausea and diarrhea  - replacing.     GERD   Large Hiatal Hernia  - CT showed possible reflux esphogitis  - continue IV PPI     Diabetes Mellitus type 2  -controlled  - Continue Lantus  - monitor blood sugars      History of Iron deficiency anemia  - hemoglobin 10.7  - check iron studies  - patient is not on iron at home     HTN  - controlled holding HCTZ at this time  - monitor blood pressure     Hypothyroidism  - home dose of synthroid 100 mcg       Aortic valve sclerosis without stenosis  - +murmur  - echo as above     #Prolonged QTc  - 490  - Avoid QT prolonging agents as able.        DVT Prophylaxis: Lovenox      Discussed code status and patient would like to remain a full code, she stated next of Kin would be Patsylauryn Hodges #[phone]. Diet: ADULT DIET;  Clear Liquid  Code Status: Full Code    Oren Valdivia [facility]  1/2/2022

## 2022-01-02 NOTE — PROGRESS NOTES
Comprehensive Nutrition Assessment    Type and Reason for Visit:  Initial,Positive Nutrition Screen (+MST=4, malnutrition)    Nutrition Recommendations/Plan:   1. Continue ADULT DIET; Full Liquid  2. Add Ensure Clear with all meals (pt dislikes milk based options)  3. Provided Colostomy diet education folder + review written materials with pt.  4. Monitor diet advancement/tolerance, appetite, meal intake, + acceptance/intake of ONS. 5. Monitor weight trends, GI status/ostomy output, and nutrition related labs. (Please obtain an updated actual weight for this patient, thanks!)    Nutrition Assessment:  Patient is nutritionally compromised AEB abd pain with alternating diarrhea and constipation x 1 month PTA and worsening x 1 week PTA, poor appetite/intake and reported weight loss x few weeks PTA, NPO/CL x 2 days admission r/t GI dysfunction d/t SBO, and patient is at risk for further compromise d/t altered GI structure POD#2 s/p ex lap with diverting loop colostomy, + obstructing rectosigmoid mass s/p biopsy and altered nutrition related labs; Will continue ADULT DIET; Full Liquid and add Ensure Clear with all meals (pt dislikes milk based options) + monitor diet advancement per surgery    Malnutrition Assessment:  Malnutrition Status: Moderate malnutrition    Context:  Acute Illness     Findings of the 6 clinical characteristics of malnutrition:  Energy Intake:  1 - 75% or less of estimated energy requirements for 7 or more days (x few weeks PTA per pt + NPO/CL x 2 days admission)  Weight Loss:  Unable to assess (d/t CBW is stated)     Body Fat Loss:  No significant body fat loss     Muscle Mass Loss:  1 - Mild muscle mass loss Temples (temporalis),Clavicles (pectoralis & deltoids),Hand (interosseous)  Fluid Accumulation:  No significant fluid accumulation     Strength:  Not Performed    Estimated Daily Nutrient Needs:  Energy (kcal):  0882-3010 kcals based on 23-25 kcals/kg/CBW;  Weight Used for Energy Requirements:  Current     Protein (g):  80-92 g protein based on 1.3-1.5 g/kg/IBW (using 61.4 kg); Weight Used for Protein Requirements:  Ideal        Fluid (ml/day):  2968-1561 ml; Method Used for Fluid Requirements:  1 ml/kcal      Nutrition Related Findings:  pt in bedside chair at time of assessment; patient is alert; reports that she used to consume 3 meals per day however pt reports consuming ~50% of her usual intake for a few weeks now r/t altered GI function; reports weight loss over the past few weeks however pt is unsure of how much; CBW is stated; Abd rigid, tender, BS active, +passing flatus; +colostomy with output today- loose, brown; POD#2 s/p ex lap with diverting loop colostomy + peritoneal biposy of obstructing rectosigmoid mass; provided pt with colostomy diet education folder + reviewed written materials with pt; pt with dentures however prefers to eat without dentures; K+, MG, Ca, and H/H are low; patient has Marcaine, lovenox, lantus, humalog, synthroid and protonix ordered at this time; Wounds:  Surgical Incision (abdominal surgical incision)       Current Nutrition Therapies:    ADULT DIET; Full Liquid    Anthropometric Measures:  · Height: 5' 7\" (170.2 cm)  · Current Body Weight: 165 lb (74.8 kg) (obtained 12/30; stated weight)   · Admission Body Weight: 165 lb (74.8 kg) (obtained 12/30; stated weight)    · Usual Body Weight: 170 lb (77.1 kg) (obtained 7/20/21 per past encounters; limited recent actual weight hx)     · Ideal Body Weight: 135 lbs; % Ideal Body Weight 122.2 %   · BMI: 25.8  · BMI Categories: Overweight (BMI 25.0-29. 9)       Nutrition Diagnosis:   · Moderate malnutrition related to inadequate protein-energy intake,altered GI function,altered GI structure as evidenced by poor intake prior to admission,weight loss,lab values,GI abnormality,nausea,other (comment),NPO or clear liquid status due to medical condition,mild muscle loss (POD#2 s/p ex lap with diverting loop colostomy + peritoneal biopsy of obstructing retrosigmoid mass)    Nutrition Interventions:   Food and/or Nutrient Delivery:  Continue Current Diet,Start Oral Nutrition Supplement  Nutrition Education/Counseling:  No recommendation at this time   Coordination of Nutrition Care:  Continue to monitor while inpatient    Goals:  patient will accept and consume 75% or greater of meals and supplements offered on ADULT DIET; Full Liquid without s/s of GI distress and without additional lab disturbances       Nutrition Monitoring and Evaluation:   Behavioral-Environmental Outcomes:  None Identified   Food/Nutrient Intake Outcomes:  Diet Advancement/Tolerance,Food and Nutrient Intake,Supplement Intake,IVF Intake  Physical Signs/Symptoms Outcomes:  Biochemical Data,GI Status,Nausea or Vomiting,Nutrition Focused Physical Findings,Skin,Weight     Discharge Planning:     Too soon to determine     Electronically signed by Bettie Templeton RD, LD on 1/2/22 at 3:47 PM EST    Contact: [phone]

## 2022-01-02 NOTE — PLAN OF CARE
Nutrition Problem #1: Moderate malnutrition  Intervention: Food and/or Nutrient Delivery: Continue Current Diet,Start Oral Nutrition Supplement  Nutritional Goals: patient will accept and consume 75% or greater of meals and supplements offered on ADULT DIET;  Full Liquid without s/s of GI distress and without additional lab disturbances

## 2022-01-02 NOTE — PROGRESS NOTES
Inpatient Occupational Therapy  Evaluation and Treatment    Unit: SURGE OVERFLOW  Date:  1/2/2022  Patient Name:    Cheng Calabrese  Admitting diagnosis:  Hypokalemia [E87.6]  SBO (small bowel obstruction) ([facility] [K56.609]  [facility]arcinomatosis [facility] [C80.0]  Admit Date:  12/30/2021  Precautions/Restrictions/WB Status/ Lines/ Wounds/ Oxygen: fall risk, IV, bed/chair alarm, sanford catheter, colostomy, Pain ball and code status: Full    Treatment Time:  815-850  Treatment Number: 1     Billable Treatment Time: 25 minutes   Total Treatment Time:   35   minutes    Patient Goals for Therapy:  \" to go home \"      Discharge Recommendations: Home with PRN assistance  and HHOT, assistance for her son, if he is home  DME needs for discharge: needs met       Therapy recommendations for staff:  Assist of 1 with use of rolling walker for all transfers to/from BSC/chair    History of Present Illness: ED note, 12/30/2021, Yanique:   \" [de-identified] y.o. female  who presents to the ED complaining of abdominal pain and intermittent diarrhea and constipation for the last month. Patient states this has been worsened over the last week, and particularly since yesterday. She indicates her pain is in the left lower quadrant as well as in the right upper quadrant. She does note that she has a knot in her stomach on the left side. She does note that she has injected her Lantus in this area in the past.  She denies any fevers, chills. She has had nausea for the last 2 days, but no vomiting.  \"     PMHx: CKD, gastroesophageal reflux disease without esophagitis, hypertension, hypothyroidism, mixed hyperlipidemia, obesity, thyrotoxicosis, type 2 DM     12/31 Exploratory laparotomy, diverting loop colostomy, peritoneal biopsy     \"In ED she was noted to be hypokalemic.  Chest x-ray shows multiple pulmonary nodules.  Abdominal CT showed a mass in the colon with peritoneal carcinomatosis multiple mets throughout the lung.  Patient admitted and general surgery consulted. \"     Home Health S4 Level Recommendation:  Level 1 Standard    AM-PAC Score: AM-PAC Inpatient Daily Activity Raw Score: 19  Pt scored a 19/24 on the AM-PAC ADL Inpatient form. Current research shows that an AM-PAC score of 18 or greater is associated with a discharge to the patient's home setting. Preadmission Environment    Pt. Lives with family ( son, patient is caregiver)  Home environment:            one story home  Steps to enter first floor: ramp  Steps to second floor:         N/A  Bathroom: tub/shower unit and elevated toilet seat,no arms, shower chair with back  Equipment owned: [facility]), walker (Rollator) and wheelchair (manual w/c)     Preadmission Status:  Pt. Able to drive: Yes  Pt Fully independent with ADLs: Yes  Pt sleeps in flat bed  Pt. Required assistance from family for: Independent PTA   Provides total care to son, currently at [facility]   Pt. independent for transfers and gait and walked with No Device  History of falls          No    Pain:  None at rest   Rating:  mild  Location:  Abdomen with movement   Pain Medicine Status:  No request made      Cognition:    A&O Person, Place, Time and Situation   Able to follow multi step directions, consistently. Subjective:  Pt supine in bed upon therapist arrival. Pt agreeable to work with therapy this date. Upper Extremity ROM:   WFL,  pt able to perform all bed mobility, transfers, and gait without ROM limitation. Upper Extremity Strength:    BUE strength WFL, but not formally assessed w/ MMT    Upper Extremity Sensation:    WNL    Upper Extremity Proprioception:  WNL    Coordination and Tone:  WNL    Balance:  Functional Sitting Balance: WNL   Comments: Good  Functional Standing Balance: WNL   Comments: With RW     Bed mobility:    Supine to sit:   supervision  Sit to supine:   Not Tested  Rolling:    Independent  Scooting in sitting:  supervision  Scooting to head of bed:    Independent   Bridging:   Not Tested    Transfers:

## 2022-01-03 LAB
ANION GAP SERPL CALCULATED.3IONS-SCNC: 8 MMOL/L (ref 3–16)
BASOPHILS ABSOLUTE: 0 K/UL (ref 0–0.2)
BASOPHILS RELATIVE PERCENT: 0.2 %
BUN BLDV-MCNC: 9 MG/DL (ref 7–20)
CALCIUM SERPL-MCNC: 8 MG/DL (ref 8.3–10.6)
CHLORIDE BLD-SCNC: 101 MMOL/L (ref 99–110)
CO2: 30 MMOL/L (ref 21–32)
CREAT SERPL-MCNC: 0.7 MG/DL (ref 0.6–1.2)
EOSINOPHILS ABSOLUTE: 0.1 K/UL (ref 0–0.6)
EOSINOPHILS RELATIVE PERCENT: 1.1 %
GFR AFRICAN AMERICAN: >60
GFR NON-AFRICAN AMERICAN: >60
GLUCOSE BLD-MCNC: 102 MG/DL (ref 70–99)
GLUCOSE BLD-MCNC: 110 MG/DL (ref 70–99)
GLUCOSE BLD-MCNC: 136 MG/DL (ref 70–99)
GLUCOSE BLD-MCNC: 147 MG/DL (ref 70–99)
GLUCOSE BLD-MCNC: 179 MG/DL (ref 70–99)
GLUCOSE BLD-MCNC: 97 MG/DL (ref 70–99)
HCT VFR BLD CALC: 30.6 % (ref 36–48)
HEMOGLOBIN: 10.2 G/DL (ref 12–16)
LYMPHOCYTES ABSOLUTE: 1.3 K/UL (ref 1–5.1)
LYMPHOCYTES RELATIVE PERCENT: 16.2 %
MAGNESIUM: 1.8 MG/DL (ref 1.8–2.4)
MCH RBC QN AUTO: 29.9 PG (ref 26–34)
MCHC RBC AUTO-ENTMCNC: 33.3 G/DL (ref 31–36)
MCV RBC AUTO: 89.8 FL (ref 80–100)
MONOCYTES ABSOLUTE: 0.7 K/UL (ref 0–1.3)
MONOCYTES RELATIVE PERCENT: 9.1 %
NEUTROPHILS ABSOLUTE: 5.8 K/UL (ref 1.7–7.7)
NEUTROPHILS RELATIVE PERCENT: 73.4 %
PDW BLD-RTO: 13.8 % (ref 12.4–15.4)
PERFORMED ON: ABNORMAL
PERFORMED ON: NORMAL
PHOSPHORUS: 1.8 MG/DL (ref 2.5–4.9)
PLATELET # BLD: 446 K/UL (ref 135–450)
PMV BLD AUTO: 6.2 FL (ref 5–10.5)
POTASSIUM REFLEX MAGNESIUM: 3.2 MMOL/L (ref 3.5–5.1)
RBC # BLD: 3.4 M/UL (ref 4–5.2)
SODIUM BLD-SCNC: 139 MMOL/L (ref 136–145)
WBC # BLD: 7.9 K/UL (ref 4–11)

## 2022-01-03 PROCEDURE — 36415 COLL VENOUS BLD VENIPUNCTURE: CPT

## 2022-01-03 PROCEDURE — 83735 ASSAY OF MAGNESIUM: CPT

## 2022-01-03 PROCEDURE — 80048 BASIC METABOLIC PNL TOTAL CA: CPT

## 2022-01-03 PROCEDURE — 2580000003 HC RX 258: Performed by: SURGERY

## 2022-01-03 PROCEDURE — 6370000000 HC RX 637 (ALT 250 FOR IP): Performed by: SURGERY

## 2022-01-03 PROCEDURE — 84100 ASSAY OF PHOSPHORUS: CPT

## 2022-01-03 PROCEDURE — 99231 SBSQ HOSP IP/OBS SF/LOW 25: CPT | Performed by: NURSE PRACTITIONER

## 2022-01-03 PROCEDURE — 6370000000 HC RX 637 (ALT 250 FOR IP): Performed by: NURSE PRACTITIONER

## 2022-01-03 PROCEDURE — 97110 THERAPEUTIC EXERCISES: CPT

## 2022-01-03 PROCEDURE — C9113 INJ PANTOPRAZOLE SODIUM, VIA: HCPCS | Performed by: SURGERY

## 2022-01-03 PROCEDURE — 97112 NEUROMUSCULAR REEDUCATION: CPT

## 2022-01-03 PROCEDURE — 6360000002 HC RX W HCPCS: Performed by: SURGERY

## 2022-01-03 PROCEDURE — 1200000000 HC SEMI PRIVATE

## 2022-01-03 PROCEDURE — 85025 COMPLETE CBC W/AUTO DIFF WBC: CPT

## 2022-01-03 RX ORDER — OXYCODONE HYDROCHLORIDE 5 MG/1
5 TABLET ORAL EVERY 4 HOURS PRN
Status: DISCONTINUED | OUTPATIENT
Start: 2022-01-03 | End: 2022-01-06 | Stop reason: HOSPADM

## 2022-01-03 RX ORDER — OXYCODONE HYDROCHLORIDE 5 MG/1
10 TABLET ORAL EVERY 4 HOURS PRN
Status: DISCONTINUED | OUTPATIENT
Start: 2022-01-03 | End: 2022-01-06 | Stop reason: HOSPADM

## 2022-01-03 RX ORDER — LEVOTHYROXINE SODIUM 0.05 MG/1
TABLET ORAL
Status: DISPENSED
Start: 2022-01-03 | End: 2022-01-03

## 2022-01-03 RX ADMIN — SODIUM CHLORIDE, PRESERVATIVE FREE 10 ML: 5 INJECTION INTRAVENOUS at 09:00

## 2022-01-03 RX ADMIN — ENOXAPARIN SODIUM 40 MG: 100 INJECTION SUBCUTANEOUS at 08:57

## 2022-01-03 RX ADMIN — SODIUM CHLORIDE, PRESERVATIVE FREE 10 ML: 5 INJECTION INTRAVENOUS at 08:57

## 2022-01-03 RX ADMIN — INSULIN GLARGINE 10 UNITS: 100 INJECTION, SOLUTION SUBCUTANEOUS at 21:27

## 2022-01-03 RX ADMIN — ACETAMINOPHEN 650 MG: 325 TABLET ORAL at 13:59

## 2022-01-03 RX ADMIN — INSULIN LISPRO 1 UNITS: 100 INJECTION, SOLUTION INTRAVENOUS; SUBCUTANEOUS at 13:29

## 2022-01-03 RX ADMIN — LEVOTHYROXINE SODIUM 100 MCG: 50 TABLET ORAL at 05:22

## 2022-01-03 RX ADMIN — SODIUM CHLORIDE, PRESERVATIVE FREE 10 ML: 5 INJECTION INTRAVENOUS at 22:12

## 2022-01-03 RX ADMIN — ONDANSETRON HYDROCHLORIDE 4 MG: 2 INJECTION, SOLUTION INTRAMUSCULAR; INTRAVENOUS at 21:32

## 2022-01-03 RX ADMIN — INSULIN LISPRO 1 UNITS: 100 INJECTION, SOLUTION INTRAVENOUS; SUBCUTANEOUS at 16:46

## 2022-01-03 RX ADMIN — PANTOPRAZOLE SODIUM 40 MG: 40 INJECTION, POWDER, FOR SOLUTION INTRAVENOUS at 08:57

## 2022-01-03 ASSESSMENT — PAIN DESCRIPTION - LOCATION: LOCATION: ABDOMEN

## 2022-01-03 ASSESSMENT — PAIN DESCRIPTION - PAIN TYPE: TYPE: SURGICAL PAIN

## 2022-01-03 ASSESSMENT — PAIN DESCRIPTION - DESCRIPTORS: DESCRIPTORS: DISCOMFORT

## 2022-01-03 ASSESSMENT — PAIN SCALES - GENERAL: PAINLEVEL_OUTOF10: 6

## 2022-01-03 NOTE — CONSULTS
Ostomy Referral Progress Note      NAME:  Sunita Lin  MEDICAL RECORD NUMBER:  [id number]  AGE: [de-identified] y.o. GENDER:  female  :  1940  TODAY'S DATE:  1/3/2022    Subjective Pt is alert and oriented, pleasant and attentive to teaching     Sunita Lin is a [de-identified] y.o. female referred by:   [x] Physician  [x] Nursing  [] Other:       Pt seen for ostomy care and teaching. Pt is s/p Exploratory Lap, diverting loop colostomy with peritoneal biopsy. Pt cares for her son who has spina bifida and a urostomy x 50 years. She is familiar with ostomy care, has also worked as an LPN in the past.      PAST MEDICAL HISTORY:        Diagnosis Date    Carcinomatosis ([facility]     CKD (chronic kidney disease) stage 3, GFR 30-59 ml/min ([facility] 2019    Gastroesophageal reflux disease without esophagitis 2021    Hypertension     Hypothyroidism     Mixed hyperlipidemia     Obesity     Thyrotoxicosis     Type 2 diabetes mellitus without complication (HCC)     Type II or unspecified type diabetes mellitus without mention of complication, not stated as uncontrolled        MEDICATIONS:    No current facility-administered medications on file prior to encounter.      Current Outpatient Medications on File Prior to Encounter   Medication Sig Dispense Refill    glipiZIDE (GLUCOTROL XL) 5 MG extended release tablet Take 1 tablet by mouth daily 30 tablet 1    LANTUS SOLOSTAR 100 UNIT/ML injection pen INJECT 10 UNITS INTO THE SKIN NIGHTLY 15 mL 0    omeprazole (PRILOSEC) 40 MG delayed release capsule TAKE 1 CAPSULE BY MOUTH EVERY MORNING (BEFORE BREAKFAST) 90 capsule 0    hydroCHLOROthiazide (HYDRODIURIL) 25 MG tablet TAKE 1 TABLET BY MOUTH DAILY 90 tablet 1    levothyroxine (SYNTHROID) 100 MCG tablet TAKE 1 TABLET BY MOUTH DAILY 90 tablet 1    Insulin Pen Needle ([employer] PEN NEEDLES 29G) 29G X 12MM MISC 1 each by Does not apply route daily 100 each 3    blood glucose test strips (ASCENSIA AUTODISC VI;ONE TOUCH ULTRA TEST VI) strip 1 each by In Vitro route daily As needed. 100 each 11    vitamin D 25 MCG (1000 UT) CAPS Take by mouth      aspirin 81 MG tablet Take 81 mg by mouth (Patient not taking: Reported on 9/21/2021)         ALLERGIES:    Allergies   Allergen Reactions    Food Color Pink Anaphylaxis    Shellfish Allergy Anaphylaxis     Heart scan    Atorvastatin      Other reaction(s): Weakness  Leg weakness    Ramipril      Other reaction(s): Cough    Metformin Nausea And Vomiting       PAST SURGICAL HISTORY:    Past Surgical History:   Procedure Laterality Date    ANKLE FRACTURE SURGERY Right 2007    CARPAL TUNNEL RELEASE Left 1998    CATARACT REMOVAL WITH IMPLANT Right 03/01/2018     PHACO EMULSIFICATION OF CATARACT WITH INTRA OCULAR LENS IMPLANT RIGHT EYE    CATARACT REMOVAL WITH IMPLANT Left 06/14/2018    HYSTERECTOMY      KNEE ARTHROSCOPY Right 2015       FAMILY HISTORY:    family history includes Diabetes in her brother and sister; Heart Disease in her sister; High Blood Pressure in her sister.     SOCIAL HISTORY:    Social History     Tobacco Use    Smoking status: Never Smoker    Smokeless tobacco: Never Used   Vaping Use    Vaping Use: Never used   Substance Use Topics    Alcohol use: No    Drug use: No       LABS:  WBC:    Lab Results   Component Value Date    WBC 7.9 01/03/2022     H/H:    Lab Results   Component Value Date    HGB 10.2 01/03/2022    HCT 30.6 01/03/2022     BMP:    Lab Results   Component Value Date     01/03/2022    K 3.2 01/03/2022     01/03/2022    CO2 30 01/03/2022    BUN 9 01/03/2022    LABALBU 2.9 12/31/2021    CREATININE 0.7 01/03/2022    CALCIUM 8.0 01/03/2022    GFRAA >60 01/03/2022    LABGLOM >60 01/03/2022    LABGLOM 59 02/20/2013    GLUCOSE 110 01/03/2022     PTT:  No results found for: APTT, PTT[APTT}  PT/INR:  No results found for: PROTIME, INR    Objective    BP (!) 155/74   Pulse 83   Temp 98.4 °F (36.9 °C) (Oral)   Resp 16   Ht 5' 7\" (1.702 m)   Wt 165 lb (74.8 kg)   SpO2 94%   BMI 25.84 kg/m²     Juan Manuel Risk Score Juan Manuel Scale Score: 19    Patient Active Problem List   Diagnosis Code    Type 2 diabetes mellitus with hyperglycemia ([facility]) E11.65    Essential hypertension I10    Hypothyroidism E03.9    Family history of early CAD Z80.55    Mixed hyperlipidemia E78.2    Precordial pain R07.2    Statin intolerance Z78.9    Thyrotoxicosis E05.90    Chronic pain of right knee M25.561, G89.29    Heart murmur R01.1    Vitamin D deficiency E55.9    Iron deficiency anemia D50.9    CKD (chronic kidney disease) stage 3, GFR 30-59 ml/min ([facility]) N18.30    Abnormal weight loss R63.4    Gastroesophageal reflux disease with esophagitis K21.00    SBO (small bowel obstruction) ([facility]) K56.609    Hypokalemia E87.6    Colonic mass K63.89    Carcinomatosis ([facility]) C80.0    Moderate protein-calorie malnutrition ([facility]) E44.0       Assessment              Colostomy LLQ Loop (Active)   Stomal Appliance 2 piece 01/02/22 1005   Stoma  Assessment Red; Swelling 01/03/22 0852   Mucocutaneous Junction Intact 01/03/22 0852   Peristomal Assessment Clean 01/03/22 0852   Treatment Liquid skin barrier;Bag change;Site care;Stoma powder 01/02/22 1005   Stool Appearance Loose 01/03/22 0852   Stool Color Brown 01/03/22 0852   Stool Amount Medium 01/03/22 0852   Output (mL) 100 ml 01/02/22 2302   Number of days: 3     Stoma is red, beefy, moist and well budded measuring 2 1/2\" round. peristomal skin is intact. Midline incision is CAROLYNN with staples, well approximated, no soi. Abdomen is soft. Stoma functioning loose brown stools and +flatus. Intake/Output Summary (Last 24 hours) at 1/3/2022 1317  Last data filed at 1/3/2022 1224  Gross per 24 hour   Intake 120 ml   Output 450 ml   Net -330 ml         Plan Skin cleansed with water and patted dry. Pt repouched using 2 3/4\" flat wafer and drainable pouch. Good seal obtained. Discussed odor, emptying, cleaning end, demonstrated burping and opening and closing end. Plan for Ostomy Care:        Ostomy Plan of Care  [x] Supplies/Instructions left in room  [] Patient using home supplies  [x] Brand/supplies at bedside [employer]  [] Current pouching system     Current Diet: ADULT DIET;  Full Liquid  ADULT ORAL NUTRITION SUPPLEMENT; Dinner, Lunch, Breakfast; Clear Liquid Oral Supplement  Dietician consult:  N/A    Discharge Plan:  Placement for patient upon discharge: home with support    Outpatient visit plan No discussed if needed  Supplies given No   Samples requested No    Referrals:  []   [] [facility]  [] Supplies  [x] Other discussed with care coordinator      Patient/Caregiver Teaching:  Written Instructions given to patient/family  Teaching provided:  [x] Reviewed GI and A&P        [x] Supplies  [x] Pouch emptying      [x] Manipulate closure  [x] Routine Care         [] Comment  [x] Pouch maintenance           Level of patient/caregiver understanding able to:  [] Indicates understanding       [] Needs reinforcement  [] Unsuccessful      [x] Verbal Understanding  [] Demonstrated understanding       [] No evidence of learning  [] Refused teaching         [] N/A    Electronically signed by Maritza Espinosa RN, CWOCN on 1/3/2022 at 1:17 PM

## 2022-01-03 NOTE — PROGRESS NOTES
Progress Note    Admit Date:  12/30/2021    The patient is a [de-identified] y.o. female past medical history chronic kidney disease, GERD, hypertension, hypothyroidism, hyperlipidemia, obesity, type 2 diabetes,  aortic valve sclerosis without stenosis who presents to [facility] with complaint of abdominal pain. In ED she was noted to be hypokalemic. Chest x-ray shows multiple pulmonary nodules. 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 Patient admitted and general surgery consulted. Subjective:  Ms. James Fofana feels okay. Wants catheter out. Pain controlled. Tolerating clear liquids. No nausea, vomiting. Objective:   No data found. Intake/Output Summary (Last 24 hours) at 1/3/2022 1409  Last data filed at 1/3/2022 1224  Gross per 24 hour   Intake 120 ml   Output 450 ml   Net -330 ml       Physical Exam:    Gen: No distress. Alert. Eyes: PERRL. No sclera icterus. No conjunctival injection. ENT: No discharge. Pharynx clear. Neck: No JVD. Trachea midline. Resp: No accessory muscle use. No crackles. No wheezes. No rhonchi. CV: Regular rate. Regular rhythm. +2/6 TROY. No rub. Trace BLE edema. GI: Tender, Non-distended. Active bowel sounds. No hernia. + colostomy, pain ball  Skin: Warm and dry. Surgical incision mid abdomen, staples. Surgical dressing. M/S: No cyanosis. No joint deformity. No clubbing. Neuro: Awake. Grossly nonfocal    Psych: Oriented x 3. No anxiety or agitation.      Data:  CBC:   Recent Labs     01/01/22  0718 01/02/22  0556 01/03/22  0608   WBC 7.2 7.5 7.9   HGB 10.4* 9.7* 10.2*   HCT 32.6* 28.1* 30.6*   MCV 94.2 89.2 89.8    404 446     BMP:   Recent Labs     01/01/22  0718 01/02/22  0556 01/03/22  0605 01/03/22  0608    137  --  139   K 3.3* 2.6*  --  3.2*    101  --  101   CO2 23 27  --  30   PHOS  --   --  1.8*  --    BUN 16 13  --  9   CREATININE 1.0 0.8  --  0.7     LIVER PROFILE: colostomy  and peritoneal biopsy. Assessment/Plan:  Abdominal Pain  N/V  Obstructin distal colon mass with possible metastatic disease  - CT as above  - General surgery consulted  - S/p Exploratory laparotomy with diverting loop sigmoid colostomy  and peritoneal biopsy.  - NPO--> Clear liquids--> Full liquids  - IVF stopped. - Nausea and pain control   POD#3     Hypokalemia  Hypomagnesemia   - 2/2 nausea and diarrhea  - replacing.     GERD   Large Hiatal Hernia  - CT showed possible reflux esphogitis  - continue IV PPI     Diabetes Mellitus type 2  -controlled  - Continue Lantus  - monitor blood sugars      History of Iron deficiency anemia  - hemoglobin 10.7  - check iron studies  - patient is not on iron at home     HTN  - controlled holding HCTZ at this time  - monitor blood pressure     Hypothyroidism  - home dose of synthroid 100 mcg       Aortic valve sclerosis without stenosis  - +murmur  - echo as above     #Prolonged QTc  - 490  - Avoid QT prolonging agents as able.        DVT Prophylaxis: Lovenox      Discussed code status and patient would like to remain a full code, she stated next of Kin would be Manuelblossom Adhikari #[phone]. Diet: ADULT DIET;  Full Liquid  ADULT ORAL NUTRITION SUPPLEMENT; Dinner, Lunch, Breakfast; Clear Liquid Oral Supplement  Code Status: Full Code     PT/OT recommending SNF    Leopoldo Guillaume [facility]  1/3/2022

## 2022-01-03 NOTE — PROGRESS NOTES
Shift assessment completed, see flowsheet. Patient resting in bed at this time. All needs met. Respirations easy and even at rest. No c/o pain at this time. Bed in lowest position and locked, SR up x2. Call light and bedside table within reach.

## 2022-01-03 NOTE — CARE COORDINATION
INTERDISCIPLINARY PLAN OF CARE CONFERENCE    Date/Time: 1/3/2022 5:16 PM  Completed by: Andre Burnette RN, Case Management      Patient Name:  Melody Riddle  YOB: 1940  Admitting Diagnosis: Hypokalemia [E87.6]  SBO (small bowel obstruction) ([facility] [address] [S15.276]  Carcinomatosis ([facility] [address] [C80.0]     Admit Date/Time:  12/30/2021  4:50 PM    Chart reviewed. Interdisciplinary team contacted or reviewed plan related to patient progress and discharge plans. Disciplines included Case Management, Nursing, and Dietitian. Current Status: IP 12/30/2021  PT/OT recommendation for discharge plan of care: SNF    Expected D/C Disposition:  Home    Discharge Plan Comments: Spoke with pt this am. She plans to return home and connect with [facility] for [facility]. Her son will go to [facility] for respite care while she recovers. She does not have 24/7 assistance for herself.  CM will follow up with patient tomorrow to see if she will reconsider SNF given todays PT/OT eval and recs for rehab    Home O2 in place on admit: No  Pt informed of need to bring portable home O2 tank on day of discharge for nursing to connect prior to leaving:  Not Indicated  Verbalized agreement/Understanding:  Not Indicated

## 2022-01-03 NOTE — PROGRESS NOTES
General Surgery - Mary Maldonado, APRN - CNP, CNP  Daily Progress Note    Pt Name: Stephanie Pfeiffer Record Number: [id number]  Date of Birth 1940   Today's Date: 1/3/2022    ASSESSMENT  1. POD #3 s/p ex lap with diverting loop sigmoid colostomy and peritoneal biopsy  2. ABD: soft, + mild distention, staples look good some ecchymosis, stoma: beefy red with edema noted, + flatus, + small amount of liquid stool, no N/V  3. K+ 3.2: on protocol replacement  4. Phos 1.8  5. VSS  6. Pt states \"I feel good and I change my son's urostomy bag all the time. \"     PLAN  1. Ostomy RN  2. PT: ambulate pt  3. DVT proph: Lovenox  4. Decrease IV pain meds and start oral  5. Full liquids with Ensure  6. Pt looks good POD #3 hopefully home in 2 days. Paola Kimball has improved from yesterday. Pain is well controlled. She has no nausea and no vomiting. She has passed flatus and has had a bowel movement. She is tolerating full liquids. Current activity is up with assistance    OBJECTIVE  VITALS:  height is 5' 7\" (1.702 m) and weight is 165 lb (74.8 kg). Her oral temperature is 98.2 °F (36.8 °C). Her blood pressure is 124/57 (abnormal) and her pulse is 85. Her respiration is 16 and oxygen saturation is 95%. VITALS:  BP (!) 124/57   Pulse 85   Temp 98.2 °F (36.8 °C) (Oral)   Resp 16   Ht 5' 7\" (1.702 m)   Wt 165 lb (74.8 kg)   SpO2 95%   BMI 25.84 kg/m²   INTAKE/OUTPUT:    Intake/Output Summary (Last 24 hours) at 1/3/2022 1708  Last data filed at 1/3/2022 1544  Gross per 24 hour   Intake 120 ml   Output 550 ml   Net -430 ml     GENERAL: alert, cooperative, no distress  I/O last 3 completed shifts:   In: 120 [P.O.:120]  Out: 450 [Urine:350; Stool:100]  I/O this shift:  In: -   Out: 100 [Stool:100]    LABS  Recent Labs     01/02/22  0556 01/03/22  0605 01/03/22  0608   WBC   < >  --  7.9   HGB   < >  --  10.2*   HCT   < >  --  30.6*   PLT   < >  --  446   NA   < >  --  139   K   < >  --  3.2*   CL   < > --  101   CO2   < >  --  30   BUN   < >  --  9   CREATININE   < >  --  0.7   MG   < >  --  1.80   PHOS  --  1.8*  --    CALCIUM   < >  --  8.0*    < > = values in this interval not displayed.      CBC with Differential:    Lab Results   Component Value Date    WBC 7.9 01/03/2022    RBC 3.40 01/03/2022    HGB 10.2 01/03/2022    HCT 30.6 01/03/2022     01/03/2022    MCV 89.8 01/03/2022    MCH 29.9 01/03/2022    MCHC 33.3 01/03/2022    RDW 13.8 01/03/2022    SEGSPCT 61.2 05/22/2013    LYMPHOPCT 16.2 01/03/2022    MONOPCT 9.1 01/03/2022    BASOPCT 0.2 01/03/2022    MONOSABS 0.7 01/03/2022    LYMPHSABS 1.3 01/03/2022    EOSABS 0.1 01/03/2022    BASOSABS 0.0 01/03/2022     CMP:    Lab Results   Component Value Date     01/03/2022    K 3.2 01/03/2022     01/03/2022    CO2 30 01/03/2022    BUN 9 01/03/2022    CREATININE 0.7 01/03/2022    GFRAA >60 01/03/2022    AGRATIO 0.9 12/30/2021    LABGLOM >60 01/03/2022    LABGLOM 59 02/20/2013    GLUCOSE 110 01/03/2022    PROT 6.0 12/31/2021    PROT 7.3 02/20/2013    LABALBU 2.9 12/31/2021    CALCIUM 8.0 01/03/2022    BILITOT 0.4 12/31/2021    ALKPHOS 82 12/31/2021    AST 15 12/31/2021    ALT 6 12/31/2021         PILY Gonzalez - CNP  Electronically signed 1/3/2022 at 5:04 PM

## 2022-01-03 NOTE — PROGRESS NOTES
Occupational Therapy Daily Treatment Note    Unit: Med/surge  Date:  1/3/2022  Patient Name:    Paul Milian  Admitting diagnosis:  Hypokalemia [E87.6]  SBO (small bowel obstruction) ([facility] [address] [K56.609]  Carcinomatosis ([facility] [address] [C80.0]  Admit Date:  12/30/2021  Precautions/Restrictions:  fall risk IV, bed/chair alarm, sanford catheter, colostomy, Pain ball and code status: Full        Discharge Recommendations: SNF  DME needs for discharge: defer to facility       Therapy recommendations for staff:   Assist of 1 (minimal assist) with use of rolling walker (RW) for all transfers within room    AM-PAC Score: AM-PAC Inpatient Daily Activity Raw Score: 19  Home Health S4 Level: NA       Treatment Time:  11:50-12:20  Treatment number:  2   Total Treatment Time:   30 minutes    History of Present Illness: ED note, 12/30/2021, Yanique:   \" 81 y.o. female  who presents to the ED complaining of abdominal pain and intermittent diarrhea and constipation for the last month.  Patient states this has been worsened over the last week, and particularly since yesterday.  She indicates her pain is in the left lower quadrant as well as in the right upper quadrant.  She does note that she has a knot in her stomach on the left side.  She does note that she has injected her Lantus in this area in the past.  She denies any fevers, chills.  She has had nausea for the last 2 days, but no vomiting. \"      PMHx: CKD, gastroesophageal reflux disease without esophagitis, hypertension, hypothyroidism, mixed hyperlipidemia, obesity, thyrotoxicosis, type 2 DM      12/31   Exploratory laparotomy, diverting loop colostomy, peritoneal biopsy      \"In ED she was noted to be hypokalemic.  Chest x-ray shows multiple pulmonary nodules.  Abdominal CT showed a mass in the colon with peritoneal carcinomatosis multiple mets throughout the lung.  Patient admitted and general surgery consulted. \"     Subjective:  Pt agreeable to tx     Pain   No  Rating: NA  Location:NA  Pain Medicine Status: Denies need and No request made      Bed Mobility:   Supine to Sit:  Supervision  Sit to Supine:  N/A  Rolling:           Supervision  Scooting:        Supervision    Transfer Training:   Sit to stand:   CGA  Stand to sit:  CGA  Bed to Chair:  Min A and with use of RW with gait belt, pt has multiple LOB with bed to chair transfer   Bed to [facility]:   N/A  Standard toilet:   N/A    Activity Tolerance   Pt completed therapy session with No adverse symptoms noted w/activity  SpO2: 90% on room air after transfer    ADL Training:   Upper body dressing:  N/A  Upper body bathing:  N/A  Lower body dressing:  Independent don/doff socks   Lower body bathing:  N/A  Toileting:   N/A  Grooming/Hygiene:  N/A    Therapeutic Exercise: Pt completed exercises in standing to further work on balance. Pt had multiple LOB while standing needing Min A to correct, mainly leaning/falling to L side  Shoulder flex/ext:  x10  Shoulder abd/add:  x10  Shoulder horizontal abd/add:  x10  Scapular retraction:  x10  Completed exercises within full available AROM    Patient Education:   Role of OT  Energy conservation techniques  Safe RW use/hand placement    Positioning Needs:   Up in chair, call light and needs in reach. Family Present:  No    Assessment: Pt would greatly benefit from cont'd OT tx and SNF placement at D/C from hospital due to her poor balance. Pt has a high falls risk at this time and pt would benefit from cont tx to work toward Ind with ADL's, IADL's, transfers, endurance, balance, safety and overall strength to return home to be caregiver for son. Pt stated her son is currently in the hospital with COVID and he will be going to a facility for respite care while she is getting rehab. Her goal is for him to come back home and to get increased assistance at home from a nurse, however pt stated they have not had a nurse recently due to staffing shortages. GOALS  1).  Bed to toilet: Independent     To be met in 5 Visits:  1). Supine to/from Sit:             Independent  2). Upper Body Bathing:         Independent  3). Lower Body Bathing:         Independent  4). Upper Body Dressing:       Independent  5). Lower Body Dressing:       Independent  6).  Pt to demonstrate UE exs x 15 reps with minimal cues      Plan: cont with POC      Angelo Charter OTR/L #[id number]        If patient discharges from this facility prior to next visit, this note will serve as the Discharge Summary

## 2022-01-04 LAB
ANION GAP SERPL CALCULATED.3IONS-SCNC: 7 MMOL/L (ref 3–16)
BASOPHILS ABSOLUTE: 0 K/UL (ref 0–0.2)
BASOPHILS RELATIVE PERCENT: 0.4 %
BUN BLDV-MCNC: 9 MG/DL (ref 7–20)
CALCIUM SERPL-MCNC: 8 MG/DL (ref 8.3–10.6)
CHLORIDE BLD-SCNC: 101 MMOL/L (ref 99–110)
CO2: 31 MMOL/L (ref 21–32)
CREAT SERPL-MCNC: 0.7 MG/DL (ref 0.6–1.2)
EOSINOPHILS ABSOLUTE: 0.1 K/UL (ref 0–0.6)
EOSINOPHILS RELATIVE PERCENT: 1.2 %
GFR AFRICAN AMERICAN: >60
GFR NON-AFRICAN AMERICAN: >60
GLUCOSE BLD-MCNC: 131 MG/DL (ref 70–99)
GLUCOSE BLD-MCNC: 141 MG/DL (ref 70–99)
GLUCOSE BLD-MCNC: 147 MG/DL (ref 70–99)
GLUCOSE BLD-MCNC: 156 MG/DL (ref 70–99)
GLUCOSE BLD-MCNC: 159 MG/DL (ref 70–99)
GLUCOSE BLD-MCNC: 160 MG/DL (ref 70–99)
HCT VFR BLD CALC: 31.5 % (ref 36–48)
HEMOGLOBIN: 10.4 G/DL (ref 12–16)
LYMPHOCYTES ABSOLUTE: 1.3 K/UL (ref 1–5.1)
LYMPHOCYTES RELATIVE PERCENT: 17.5 %
MAGNESIUM: 1.5 MG/DL (ref 1.8–2.4)
MCH RBC QN AUTO: 30.1 PG (ref 26–34)
MCHC RBC AUTO-ENTMCNC: 33.2 G/DL (ref 31–36)
MCV RBC AUTO: 90.6 FL (ref 80–100)
MONOCYTES ABSOLUTE: 0.7 K/UL (ref 0–1.3)
MONOCYTES RELATIVE PERCENT: 9.2 %
NEUTROPHILS ABSOLUTE: 5.3 K/UL (ref 1.7–7.7)
NEUTROPHILS RELATIVE PERCENT: 71.7 %
PDW BLD-RTO: 14.4 % (ref 12.4–15.4)
PERFORMED ON: ABNORMAL
PHOSPHORUS: 2.7 MG/DL (ref 2.5–4.9)
PLATELET # BLD: 490 K/UL (ref 135–450)
PMV BLD AUTO: 6.2 FL (ref 5–10.5)
POTASSIUM REFLEX MAGNESIUM: 3.2 MMOL/L (ref 3.5–5.1)
RBC # BLD: 3.47 M/UL (ref 4–5.2)
SODIUM BLD-SCNC: 139 MMOL/L (ref 136–145)
WBC # BLD: 7.4 K/UL (ref 4–11)

## 2022-01-04 PROCEDURE — 97110 THERAPEUTIC EXERCISES: CPT

## 2022-01-04 PROCEDURE — 85025 COMPLETE CBC W/AUTO DIFF WBC: CPT

## 2022-01-04 PROCEDURE — 6360000002 HC RX W HCPCS: Performed by: INTERNAL MEDICINE

## 2022-01-04 PROCEDURE — 6370000000 HC RX 637 (ALT 250 FOR IP): Performed by: SURGERY

## 2022-01-04 PROCEDURE — 6370000000 HC RX 637 (ALT 250 FOR IP): Performed by: NURSE PRACTITIONER

## 2022-01-04 PROCEDURE — 6360000002 HC RX W HCPCS: Performed by: SURGERY

## 2022-01-04 PROCEDURE — 84100 ASSAY OF PHOSPHORUS: CPT

## 2022-01-04 PROCEDURE — C9113 INJ PANTOPRAZOLE SODIUM, VIA: HCPCS | Performed by: SURGERY

## 2022-01-04 PROCEDURE — 83735 ASSAY OF MAGNESIUM: CPT

## 2022-01-04 PROCEDURE — 2580000003 HC RX 258: Performed by: SURGERY

## 2022-01-04 PROCEDURE — 97530 THERAPEUTIC ACTIVITIES: CPT

## 2022-01-04 PROCEDURE — 36415 COLL VENOUS BLD VENIPUNCTURE: CPT

## 2022-01-04 PROCEDURE — 1200000000 HC SEMI PRIVATE

## 2022-01-04 PROCEDURE — 80048 BASIC METABOLIC PNL TOTAL CA: CPT

## 2022-01-04 PROCEDURE — 99024 POSTOP FOLLOW-UP VISIT: CPT | Performed by: NURSE PRACTITIONER

## 2022-01-04 RX ORDER — MAGNESIUM SULFATE IN WATER 40 MG/ML
2000 INJECTION, SOLUTION INTRAVENOUS ONCE
Status: COMPLETED | OUTPATIENT
Start: 2022-01-04 | End: 2022-01-04

## 2022-01-04 RX ORDER — LEVOTHYROXINE SODIUM 0.05 MG/1
TABLET ORAL
Status: DISPENSED
Start: 2022-01-04 | End: 2022-01-04

## 2022-01-04 RX ADMIN — POTASSIUM CHLORIDE 10 MEQ: 7.45 INJECTION INTRAVENOUS at 16:27

## 2022-01-04 RX ADMIN — ONDANSETRON 4 MG: 4 TABLET, ORALLY DISINTEGRATING ORAL at 06:20

## 2022-01-04 RX ADMIN — POTASSIUM CHLORIDE 10 MEQ: 7.45 INJECTION INTRAVENOUS at 12:44

## 2022-01-04 RX ADMIN — LEVOTHYROXINE SODIUM 100 MCG: 50 TABLET ORAL at 06:20

## 2022-01-04 RX ADMIN — POTASSIUM CHLORIDE 10 MEQ: 7.45 INJECTION INTRAVENOUS at 14:59

## 2022-01-04 RX ADMIN — ONDANSETRON 4 MG: 4 TABLET, ORALLY DISINTEGRATING ORAL at 17:14

## 2022-01-04 RX ADMIN — PANTOPRAZOLE SODIUM 40 MG: 40 INJECTION, POWDER, FOR SOLUTION INTRAVENOUS at 09:04

## 2022-01-04 RX ADMIN — INSULIN GLARGINE 10 UNITS: 100 INJECTION, SOLUTION SUBCUTANEOUS at 21:48

## 2022-01-04 RX ADMIN — SODIUM CHLORIDE, PRESERVATIVE FREE 10 ML: 5 INJECTION INTRAVENOUS at 21:42

## 2022-01-04 RX ADMIN — SODIUM CHLORIDE, PRESERVATIVE FREE 10 ML: 5 INJECTION INTRAVENOUS at 09:14

## 2022-01-04 RX ADMIN — POTASSIUM CHLORIDE 10 MEQ: 7.45 INJECTION INTRAVENOUS at 09:15

## 2022-01-04 RX ADMIN — ACETAMINOPHEN 650 MG: 325 TABLET ORAL at 09:04

## 2022-01-04 RX ADMIN — INSULIN LISPRO 1 UNITS: 100 INJECTION, SOLUTION INTRAVENOUS; SUBCUTANEOUS at 09:04

## 2022-01-04 RX ADMIN — ENOXAPARIN SODIUM 40 MG: 100 INJECTION SUBCUTANEOUS at 09:03

## 2022-01-04 RX ADMIN — MAGNESIUM SULFATE HEPTAHYDRATE 2000 MG: 40 INJECTION, SOLUTION INTRAVENOUS at 11:01

## 2022-01-04 RX ADMIN — INSULIN LISPRO 1 UNITS: 100 INJECTION, SOLUTION INTRAVENOUS; SUBCUTANEOUS at 17:15

## 2022-01-04 ASSESSMENT — PAIN SCALES - GENERAL: PAINLEVEL_OUTOF10: 5

## 2022-01-04 NOTE — CONSULTS
Pt seen for ostomy care and teaching. S/P Exploratory laparotomy with diverting loop sigmoid colostomy and peritoneal biopsy. Pt lying in bed, not feeling as well today. Complaints of nausea. States she has been getting out of bed to the chair with therapy but \"falling to the left\". Concerned with going home and falling. Current ostomy appliance is intact with loose brown stool present. Will bring supplies to her room of current pouching system. Will continue to follow for teaching. Pt denies any questions at this time.

## 2022-01-04 NOTE — PROGRESS NOTES
Occupational Therapy Daily Treatment Note    Unit: Med/surge  Date:  1/4/2022  Patient Name:    Alisa Dillard  Admitting diagnosis:  Hypokalemia [E87.6]  SBO (small bowel obstruction) ([facility] [address] [K56.609]  Carcinomatosis ([facility] [address] [C80.0]  Admit Date:  12/30/2021  Precautions/Restrictions:  fall risk, IV, bed/chair alarm, pure wick catheter, colostomy, code status: Full        Discharge Recommendations: SNF -pt does not have 24hr care  DME needs for discharge: defer to facility       Therapy recommendations for staff:   Assist of 1 (minimal assist) with use of rolling walker (RW) for all transfers within room    AM-PAC Score: AM-PAC Inpatient Daily Activity Raw Score: [address] S4 Level: NA       Treatment Time:  1028-1882  Treatment number:  3  Total Treatment Time:  31  minutes    History of Present Illness: ED note, 12/30/2021, Yanique:   \" 81 y.o. female  who presents to the ED complaining of abdominal pain and intermittent diarrhea and constipation for the last month.  Patient states this has been worsened over the last week, and particularly since yesterday.  She indicates her pain is in the left lower quadrant as well as in the right upper quadrant.  She does note that she has a knot in her stomach on the left side.  She does note that she has injected her Lantus in this area in the past.  She denies any fevers, chills.  She has had nausea for the last 2 days, but no vomiting. \"   PMHx: CKD, gastroesophageal reflux disease without esophagitis, hypertension, hypothyroidism, mixed hyperlipidemia, obesity, thyrotoxicosis, type 2 DM   12/31   Exploratory laparotomy, diverting loop colostomy, peritoneal biopsy   \"In ED she was noted to be hypokalemic.  Chest x-ray shows multiple pulmonary nodules.  Abdominal CT showed a mass in the colon with peritoneal carcinomatosis multiple mets throughout the lung.  Patient admitted and general surgery consulted. \"     Subjective:  Pt agreeable to tx.  Was found sitting in recliner. Pain   No  Rating: NA  Location:NA  Pain Medicine Status: Denies need and No request made      Bed Mobility:   Supine to Sit:  N/A  Sit to Supine:  N/A  Rolling:           NA  Scooting:        Supervision    Transfer Training:   Sit to stand:   CGA  Stand to sit:  CGA  Bed to Chair:  Not tested   Bed to [facility]:   N/A  Standard toilet:   N/A    Activity Tolerance   Pt completed therapy session with No adverse symptoms noted w/activity    ADL Training:   Upper body dressing:  N/A  Upper body bathing:  N/A  Lower body dressing:  Independent don/doff socks   Lower body bathing:  N/A  Toileting:   N/A  Grooming/Hygiene:  N/A    Therapeutic Exercise:   Shoulder flex/ext:  x15  Bench press:  x15  Upward press:  x15  Scapular retraction:  x15  Bicep curls: x15  Completed exercises within full available AROM    Patient Education:   Role of OT  Energy conservation techniques  Safe RW use/hand placement, transfer technique    Positioning Needs:   Up in chair, call light and needs in reach. Family Present:  No    Assessment: Improved balance noted today. Pt would greatly benefit from cont'd OT tx and SNF placement at D/C from hospital due to her Impaired balance and endurance. Pt has a high falls risk at this time and pt would benefit from cont tx to work toward Ind with ADL's, IADL's, transfers, endurance, balance, safety and overall strength to return home to be caregiver for son. Pt stated her son is currently in the hospital with COVID and he will be going to a facility for respite care while she is getting rehab. Her goal is for him to come back home and to get increased assistance at home from a nurse, however pt stated they have not had a nurse recently due to staffing shortages. GOALS  1). Bed to toilet: Independent     To be met in 5 Visits:  1). Supine to/from Sit:             Independent  2). Upper Body Bathing:         Independent  3). Lower Body Bathing:         Independent  4).  Upper Body Dressing:       Independent  5). Lower Body Dressing:       Independent  6).  Pt to demonstrate UE exs x 15 reps with minimal cues (goal met 1/4/21-increase to 20 reps)      Plan: cont with [facility] MS, OTR/L  #[id number]        If patient discharges from this facility prior to next visit, this note will serve as the Discharge Summary

## 2022-01-04 NOTE — PLAN OF CARE
Problem: Discharge Planning:  Goal: Participates in care planning  Description: Participates in care planning  Outcome: Ongoing  Goal: Discharged to appropriate level of care  Description: Discharged to appropriate level of care  Outcome: Ongoing     Problem: Activity Intolerance:  Goal: Ability to tolerate increased activity will improve  Description: Ability to tolerate increased activity will improve  Outcome: Ongoing     Problem: Anxiety/Stress:  Goal: Level of anxiety will decrease  Description: Level of anxiety will decrease  Outcome: Ongoing     Problem:  Bowel Function - Altered:  Goal: Bowel elimination is within specified parameters  Description: Bowel elimination is within specified parameters  Outcome: Ongoing     Problem: Fluid Volume - Deficit:  Goal: Absence of fluid volume deficit signs and symptoms  Description: Absence of fluid volume deficit signs and symptoms  Outcome: Ongoing  Goal: Electrolytes within specified parameters  Description: Electrolytes within specified parameters  Outcome: Ongoing     Problem: Mental Status - Impaired:  Goal: Absence of continued neurological deterioration signs and symptoms  Description: Absence of continued neurological deterioration signs and symptoms  Outcome: Ongoing  Goal: Absence of physical injury  Description: Absence of physical injury  Outcome: Ongoing  Goal: Mental status will be restored to baseline  Description: Mental status will be restored to baseline  Outcome: Ongoing     Problem: Mobility - Impaired:  Goal: Mobility will improve to maximum level  Description: Mobility will improve to maximum level  Outcome: Ongoing     Problem: Nutrition Deficit:  Goal: Ability to achieve adequate nutritional intake will improve  Description: Ability to achieve adequate nutritional intake will improve  Outcome: Ongoing     Problem: Pain:  Goal: Pain level will decrease  Description: Pain level will decrease  Outcome: Ongoing  Goal: Ability to notify healthcare provider of pain before it becomes unmanageable or unbearable will improve  Description: Ability to notify healthcare provider of pain before it becomes unmanageable or unbearable will improve  Outcome: Ongoing  Goal: Control of acute pain  Description: Control of acute pain  Outcome: Ongoing  Goal: Control of chronic pain  Description: Control of chronic pain  Outcome: Ongoing     Problem: Serum Glucose Level - Abnormal:  Goal: Ability to maintain appropriate glucose levels will improve to within specified parameters  Description: Ability to maintain appropriate glucose levels will improve to within specified parameters  Outcome: Ongoing     Problem: Skin Integrity - Impaired:  Goal: Will show no infection signs and symptoms  Description: Will show no infection signs and symptoms  Outcome: Ongoing  Goal: Absence of new skin breakdown  Description: Absence of new skin breakdown  Outcome: Ongoing     Problem: Sleep Pattern Disturbance:  Goal: Appears well-rested  Description: Appears well-rested  Outcome: Ongoing     Problem: Nutrition  Goal: Optimal nutrition therapy  Outcome: Ongoing  Goal: Understanding of nutritional guidelines  Outcome: Ongoing     Problem: Falls - Risk of:  Goal: Absence of physical injury  Description: Absence of physical injury  Outcome: Ongoing  Goal: Will remain free from falls  Description: Will remain free from falls  Outcome: Ongoing

## 2022-01-04 NOTE — PROGRESS NOTES
IM Progress Note    Admit Date:  12/30/2021    The patient is a [de-identified] y.o. female past medical history chronic kidney disease, GERD, hypertension, hypothyroidism, hyperlipidemia, obesity, type 2 diabetes,  aortic valve sclerosis without stenosis who presents to [facility] with complaint of abdominal pain. In ED she was noted to be hypokalemic. Chest x-ray shows multiple pulmonary nodules. 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 Patient admitted and general surgery consulted. S/P Exploratory laparotomy with diverting loop sigmoid colostomy  and peritoneal biopsy. POD #4    Subjective:  Ms. Davida Orozco feels okay. She is tolerating full liquid diet. Good ostomy output. She still very weak. Needs SNF placement. -I have discussed with patient's nurse. Nicole Records Biopsy results still pending, patient will need oncology consultation. Pain controlled. Patient had episode of emesis yesterday. No nausea, vomiting today    Objective:   Patient Vitals for the past 4 hrs:   BP Temp Temp src Pulse Resp SpO2   01/04/22 0845 (!) 153/70 96.5 °F (35.8 °C) Oral 79 16 95 %            Intake/Output Summary (Last 24 hours) at 1/4/2022 1133  Last data filed at 1/4/2022 0620  Gross per 24 hour   Intake 120 ml   Output 500 ml   Net -380 ml       Physical Exam:  Gen: No distress. Alert. Eyes: PERRL. No sclera icterus. No conjunctival injection. ENT: No discharge. Pharynx clear. Neck: No JVD. Trachea midline. Resp: No accessory muscle use. No crackles. No wheezes. No rhonchi. CV: Regular rate. Regular rhythm. +2/6 TROY. No rub. Trace BLE edema. GI: Tender, Non-distended. Active bowel sounds. No hernia. + colostomy, pain ball  Skin: Warm and dry. Surgical incision mid abdomen, staples. Surgical dressing. M/S: No cyanosis. No joint deformity. No clubbing. Neuro: Awake. Grossly nonfocal    Psych: Oriented x 3. No anxiety or agitation. Data:  CBC:   Recent Labs     01/02/22  0556 01/03/22  0608 01/04/22  0629   WBC 7.5 7.9 7.4   HGB 9.7* 10.2* 10.4*   HCT 28.1* 30.6* 31.5*   MCV 89.2 89.8 90.6    446 490*     BMP:   Recent Labs     01/02/22  0556 01/03/22  0605 01/03/22  0608 01/04/22  0629     --  139 139   K 2.6*  --  3.2* 3.2*     --  101 101   CO2 27  --  30 31   PHOS  --  1.8*  --   --    BUN 13  --  9 9   CREATININE 0.8  --  0.7 0.7     LIVER PROFILE:   No results for input(s): AST, ALT, LIPASE, BILIDIR, BILITOT, ALKPHOS in the last 72 hours. Invalid input(s): AMYLASE,  ALB  PT/INR: No results for input(s): PROTIME, INR in the last 72 hours. CULTURES  SARS-CoV-2 RNA, RT PCR NOT DETECTED        INFLUENZA A NOT DETECTED     INFLUENZA B NOT DETECTED           RADIOLOGY  CT CHEST ABDOMEN PELVIS WO CONTRAST   Final Result   1. Multiple bilateral pulmonary nodules most consistent with metastatic   disease. No acute pulmonary infiltrate. 2. Moderate distention of the colon with retained stool. Abrupt termination   of the stool column in the sigmoid colon, likely an underlying malignancy. Nodularity in the adjacent mesenteric and retroperitoneal fat consistent with   carcinomatosis. 3. No other evidence of abdominal or pelvic metastatic disease. Evaluation   is limited by the lack of intravenous contrast.   4. Cholelithiasis with no acute features. 5. Colonic diverticulosis with no evidence of diverticulitis. 6. Large hiatal hernia. Mural thickening of the esophagus suggesting a   reflux esophagitis. XR CHEST PORTABLE   Final Result   Multiple pulmonary nodules are observed throughout the lungs, new since 2014. There are also interstitial opacities bilaterally. Recommend a follow-up CT   of the chest for further evaluation. An acute infectious or inflammatory   process may be considered, but malignancy cannot be excluded.              Echo 2/2020  Summary   Normal left ventricle size and systolic function with a visually estimated   ejection fraction of 55%.   Mild septal hypertrophy.   No regional wall motion abnormalities are seen.   Normal left ventricular diastolic filling pressure.   Aortic valve sclerosis without stenosis.   Mild mitral regurgitation.   Systolic pulmonary artery pressure (SPAP) is normal and estimated at 25 mmHg   (right atrial pressure 8 mmHg). Procedures:  Exploratory laparotomy with diverting loop sigmoid colostomy  and peritoneal biopsy. Biopsy results pending        Assessment/Plan:  Abdominal Pain  N/V  Obstructin distal colon mass with possible metastatic disease  - CT as above  - General surgery consulted  - S/p Exploratory laparotomy with diverting loop sigmoid colostomy  and peritoneal biopsy on 12/31. POD #4.  -Biopsy results pending  We will consult oncology  - NPO--> Clear liquids--> Full liquids. Tolerating well  - IVF stopped. - Nausea and pain control        Hypokalemia  Hypomagnesemia   - 2/2 nausea and diarrhea  Continue to replace. Recheck phosphorus levels. GERD   Large Hiatal Hernia  - CT showed possible reflux esphogitis  - continue IV PPI     Diabetes Mellitus type 2  -controlled  - Continue Lantus  - monitor blood sugars      History of Iron deficiency anemia  - hemoglobin 10.7  - check iron studies  - patient is not on iron at home     HTN  - controlled holding HCTZ at this time  - monitor blood pressure     Hypothyroidism  - home dose of synthroid 100 mcg       Aortic valve sclerosis without stenosis  - +murmur  - echo as above     Prolonged QTc  - 490  - Avoid QT prolonging agents as able.       Weakness and debility  - PT/OT recommending SNF  DVT Prophylaxis: Lovenox       code status was addressed with patient this admission. Patient would like to remain a full code, she stated next of Kin would be Giuliano Fernandez #[phone]. Diet: ADULT DIET;  Full Liquid  ADULT ORAL NUTRITION SUPPLEMENT; Dinner, Lunch, Breakfast; Clear Liquid Oral Supplement  Code Status: Full Code     DC planning for SNF        Cecilio Elliott MD  1/4/2022

## 2022-01-04 NOTE — PROGRESS NOTES
Inpatient Physical Therapy Daily Treatment Note    Unit: Med surge overflow  Date:  1/4/2022  Patient Name:    Willem Medina  Admitting diagnosis:  Hypokalemia [E87.6]  SBO (small bowel obstruction) ([facility] [address] [K56.609]  Carcinomatosis ([facility] [address] [C80.0]  Admit Date:  12/30/2021  Precautions/Restrictions:  Fall risk, Bed/chair alarm, Lines -IV, Tuttle catheter and colostomy and pain ball      Discharge Recommendations: SNF - pt does not have 24hr care  DME needs for discharge: RW       Therapy recommendation for EMS Transport: can transport by wheelchair    Therapy recommendations for staff:   Assist of 1 with use of rolling walker (RW) and gait belt for all transfers and ambulation to/from [facility]  to/from chair    History of Present Illness: ED note, 12/30/2021, [place]:   \" 81 y.o. female  who presents to the ED complaining of abdominal pain and intermittent diarrhea and constipation for the last month.  Patient states this has been worsened over the last week, and particularly since yesterday.  She indicates her pain is in the left lower quadrant as well as in the right upper quadrant.  She does note that she has a knot in her stomach on the left side.  She does note that she has injected her Lantus in this area in the past.  She denies any fevers, chills.  She has had nausea for the last 2 days, but no vomiting. \"      PMHx: CKD, gastroesophageal reflux disease without esophagitis, hypertension, hypothyroidism, mixed hyperlipidemia, obesity, thyrotoxicosis, type 2 DM      12/31   Exploratory laparotomy, diverting loop colostomy, peritoneal biopsy      \"In ED she was noted to be hypokalemic.  Chest x-ray shows multiple pulmonary nodules.  Abdominal CT showed a mass in the colon with peritoneal carcinomatosis multiple mets throughout the lung.  Patient admitted and general surgery consulted. \"     Home Health S4 Level Recommendation: NA  AM-PAC Mobility Score   AM-PAC Inpatient Mobility Raw Score : 16       Treatment Time: 13:38 - 14:05  Treatment number: 2  Timed Code Treatment Minutes: 27 minutes  Total Treatment Minutes:  27  minutes    Cognition    A&O x4   Able to follow 2 step commands    Subjective  Patient lying supine in bed with no family present   Pt agreeable to this PT tx. Pt stating that she feels she will fall if she goes home and she wants to go to SNF. Pain   Yes  Location: abdomen  Rating:    mild/10  Pain Medicine Status: Denies need     Bed Mobility   Supine to Sit:    Supervision  Sit to Supine:   Not Tested  Rolling:   Supervision  Scooting:   Supervision    Transfer Training     Sit to stand:   CGA  Stand to sit:   CGA  Bed to Chair:   Min A  with use of gait belt and rolling walker (RW)    Gait Training gait completed as indicated below  Distance:      5 ft  Deviations (firm surface/linoleum):  decreased aundrea, increased AMY, forward flexed posture, step through pattern and decreased step length bilaterally  Assistive Device Used:    gait belt and rolling walker (RW)  Level of Assist:    Min A   Comment: assist with safe RW navigation. Pt declined further ambulation. Stair Training deferred, pt unsafe/not appropriate to complete stairs at this time  # of Steps:   N/A  Level of Assist:  Not Tested  UE Support:  NA  Assistive Device:  N/A  Pattern:   N/A  Comments:     Therapeutic Exercise all completed bilaterally unless indicated  Ankle Pumps x 20 reps  Glut sets x 10 reps  Quad sets x 10 reps  LAQ x 20 reps  Hip abduction: x 20 reps    Balance  Sitting:  Good ; Supervision  Comments:     Standing: Fair +; CGA and Min A   Comments: with RW    Patient Education      Role of PT, POC, Discharge recommendations, DC recommendations, safety awareness, transfer techniques, pacing activity and calling for assist with mobility. Positioning Needs       Pt reclined in chair, no alarm needed, positioned in proper neutral alignment and pressure relief provided.    Call light provided and all needs within reach RN aware    Activity Tolerance   Pt completed therapy session with No adverse symptoms noted w/activity. Other    Assessment :  Patient with fair tolerance for tx. Pt agreeable to OOB but declining gait training. Pt declining gait 2/2 fear of falling. PT reassured pt that ambulation is very important in her recovery and PT is there to help with this and ensure pt will not fall. Pt agreeable to gait tomorrow. Pt wanting to D/C to SNF as she does not have 24hr care at home and is currently unable to care for her disabled son who needs total A. Recommending SNF upon discharge as patient functioning well below baseline, demonstrates good rehab potential and unable to return home due to limited or no family support, home environment not conducive to patient recovery and limited safety awareness. Goals (all goals ongoing unless otherwise indicated)  To be met in 3 visits:  1). Independent with LE Ex x 10 reps  2.) Bed to chair: Independent     To be met in 6 visits:  1). Supine to/from sit: Independent  2). Sit to/from stand: Independent  3). Bed to chair: Independent  4). Gait: Ambulate 150 ft.  with  SBA and use of LRAD (least restrictive assistive device)  5). Tolerate B LE exercises 3 sets of 10-15 reps    Plan   Continue with plan of care. Signature: Luis Oakes PT, DPT, OMT-C  #[id number]      If patient discharges from this facility prior to next visit, this note will serve as the Discharge Summary.

## 2022-01-04 NOTE — FLOWSHEET NOTE
01/04/22 0845   Vital Signs   Temp 96.5 °F (35.8 °C)   Temp Source Oral   Pulse 79   Heart Rate Source Monitor   Resp 16   BP (!) 153/70   BP Location Right upper arm   Patient Position Semi fowlers   Level of Consciousness Alert (0)   MEWS Score 1   Oxygen Therapy   SpO2 95 %   O2 Device None (Room air)   Shift assessment complete. See flow sheet. Scheduled medications given, See MAR. Head to toe complete. Vital signs logged and active bowel sounds in all 4 quadrants. Pt awake in bed at this time. Pt states she was able to get some rest at this time. Pt states she is currently not having any nausea or vomiting or pain. No further needs noted at this time. Call light and bedside table within reach. Bed in lowest position, wheels locked and side rails up x2.      Remi Alonso RN

## 2022-01-04 NOTE — PROGRESS NOTES
Shift assessment completed. Pt is alert and oriented. Vital signs are WNL. Respirations are even & easy. Pt complains of nausea. Prn meds given. Bowel sounds present. Midline Ruthe Sallies is c,d and I. Ostomy in tact with minimal OP noted. Pt reports some gas. Denies pain to abd. Pt denies needs at this time. SR up x 2 and bed in low position. Call light is within reach. Will monitor.

## 2022-01-04 NOTE — PROGRESS NOTES
General Surgery - Mary Ramirez, PILY - CNP, CNP  Daily Progress Note    Pt Name: Stephanie Pfeiffre Record Number: [id number]  Date of Birth 1940   Today's Date: 1/4/2022    ASSESSMENT  1. POD #3 s/p ex lap with diverting loop sigmoid colostomy and peritoneal biopsy  2. ABD: soft, + mild distention, staples look good some ecchymosis, stoma:red, moist, + flatus, + small amount of liquid stool, Pt reports V x 2 last night, none recorded or documented, no N/V now  3. K+ 3.2: on protocol replacement  4. Mg 1.5  5. Ostomy with 1.3L out  6. VSS  7. Pt states \"I feel ok but, if you send me home I am going to fall right away. \" Pt is requesting to be discharged to the same SNF as her son. PLAN  1. Ostomy RN  2. PT/OT: ambulate pt  3. DVT proph: Lovenox  4. Oral pain meds  5. Full liquids with Ensure  6. Pt looks good POD #4: she is requesting SNF: discharge planning for placement per primary team    Julianna Brush has improved from yesterday. Pain is well controlled. She has no nausea and no vomiting. She has passed flatus and has had a bowel movement. She is tolerating full liquids. Current activity is up with assistance    OBJECTIVE  VITALS:  height is 5' 7\" (1.702 m) and weight is 165 lb (74.8 kg). Her oral temperature is 96.5 °F (35.8 °C). Her blood pressure is 153/70 (abnormal) and her pulse is 79. Her respiration is 16 and oxygen saturation is 95%. VITALS:  BP (!) 153/70   Pulse 79   Temp 96.5 °F (35.8 °C) (Oral)   Resp 16   Ht 5' 7\" (1.702 m)   Wt 165 lb (74.8 kg)   SpO2 95%   BMI 25.84 kg/m²   INTAKE/OUTPUT:      Intake/Output Summary (Last 24 hours) at 1/4/2022 1353  Last data filed at 1/4/2022 0620  Gross per 24 hour   Intake --   Output 500 ml   Net -500 ml     GENERAL: alert, cooperative, no distress  I/O last 3 completed shifts: In: 120 [P.O.:120]  Out: 500 [Urine:300; Stool:200]  No intake/output data recorded.     [facility]  Recent Labs     01/03/22  1247 01/03/22  9238 01/04/22 0629   WBC  --    < > 7.4   HGB  --    < > 10.4*   HCT  --    < > 31.5*   PLT  --    < > 490*   NA  --    < > 139   K  --    < > 3.2*   CL  --    < > 101   CO2  --    < > 31   BUN  --    < > 9   CREATININE  --    < > 0.7   MG  --    < > 1.50*   PHOS 1.8*  --   --    CALCIUM  --    < > 8.0*    < > = values in this interval not displayed.      CBC with Differential:    Lab Results   Component Value Date    WBC 7.4 01/04/2022    RBC 3.47 01/04/2022    HGB 10.4 01/04/2022    HCT 31.5 01/04/2022     01/04/2022    MCV 90.6 01/04/2022    MCH 30.1 01/04/2022    MCHC 33.2 01/04/2022    RDW 14.4 01/04/2022    SEGSPCT 61.2 05/22/2013    LYMPHOPCT 17.5 01/04/2022    MONOPCT 9.2 01/04/2022    BASOPCT 0.4 01/04/2022    MONOSABS 0.7 01/04/2022    LYMPHSABS 1.3 01/04/2022    EOSABS 0.1 01/04/2022    BASOSABS 0.0 01/04/2022     CMP:    Lab Results   Component Value Date     01/04/2022    K 3.2 01/04/2022     01/04/2022    CO2 31 01/04/2022    BUN 9 01/04/2022    CREATININE 0.7 01/04/2022    GFRAA >60 01/04/2022    AGRATIO 0.9 12/30/2021    LABGLOM >60 01/04/2022    LABGLOM 59 02/20/2013    GLUCOSE 156 01/04/2022    PROT 6.0 12/31/2021    PROT 7.3 02/20/2013    LABALBU 2.9 12/31/2021    CALCIUM 8.0 01/04/2022    BILITOT 0.4 12/31/2021    ALKPHOS 82 12/31/2021    AST 15 12/31/2021    ALT 6 12/31/2021         PILY Espitia - CNP  Electronically signed 1/4/2022 at 1:53 PM

## 2022-01-04 NOTE — CONSULTS
HEMATOLOGY/ONCOLOGY CONSULTATION:     1/5/2022 8:16 AM    REASON FOR CONSULT: Carcinomatosis    PROVIDERS:  PILY Nascimento CNP    CHIEF COMPLAINT:     Chief Complaint   Patient presents with    Abdominal Pain     Pt arrived via EMS c/o abdominal pain. Pt states her stools have been runny and she states that she feels a hard spot in her lower left abdomen. Rigid and tenderness noted per EMS       HISTORY OF PRESENT ILLNESS:     HPI:  [de-identified] WF with pmh CKD, HTN, hypothyroidism and DM2. The patient was admitted 12/31. She presented with abdominal pain and distension. She was also having diarrhea alternating with constipation. CT scan showed an obstructing sigmoid colon mass and carcinomatosis and lung metastasis. She underwent a diverting colostomy and peritoneal biopsy and is now POD 4. Pathology is pending.      PAST MEDICAL HISTORY:     Past Medical History:   Diagnosis Date    Carcinomatosis ([address]     CKD (chronic kidney disease) stage 3, GFR 30-59 ml/min ([facility]) 9/17/2019    Gastroesophageal reflux disease without esophagitis 7/20/2021    Hypertension     Hypothyroidism     Mixed hyperlipidemia     Obesity     Thyrotoxicosis     Type 2 diabetes mellitus without complication ([facility])     Type II or unspecified type diabetes mellitus without mention of complication, not stated as uncontrolled        PAST SURGICAL HISTORY:        Past Surgical History:   Procedure Laterality Date    ANKLE FRACTURE SURGERY Right 2007    CARPAL TUNNEL RELEASE Left 1998    CATARACT REMOVAL WITH IMPLANT Right 03/01/2018     PHACO EMULSIFICATION OF CATARACT WITH INTRA OCULAR LENS IMPLANT RIGHT EYE    CATARACT REMOVAL WITH IMPLANT Left 06/14/2018    COLOSTOMY N/A 12/31/2021    EXPLORATORY LAPAROTOMY, DIVERTING LOOP COLOSTOMY, PERITONEAL BIOPSY performed by Inder Washington MD at [address] ARTHROSCOPY Right 2015       SOCIAL HISTORY:     Social History     Socioeconomic History    Marital status:  Spouse name: Not on file    Number of children: Not on file    Years of education: Not on file    Highest education level: Not on file   Occupational History    Not on file   Tobacco Use    Smoking status: Never Smoker    Smokeless tobacco: Never Used   Vaping Use    Vaping Use: Never used   Substance and Sexual Activity    Alcohol use: No    Drug use: No    Sexual activity: Yes     Partners: Male   Other Topics Concern    Not on file   Social History Narrative    Not on file     Social Determinants of Health     Financial Resource Strain: Low Risk     Difficulty of Paying Living Expenses: Not hard at all   Food Insecurity: No Food Insecurity    Worried About [address] in the Last Year: Never true    [address] [facility] [address] in the Last Year: Never true   Transportation Needs:     Lack of Transportation (Medical): Not on file    Lack of Transportation (Non-Medical):  Not on file   Physical Activity:     Days of Exercise per Week: Not on file    Minutes of Exercise per Session: Not on file   Stress:     Feeling of Stress : Not on file   Social Connections:     Frequency of Communication with Friends and Family: Not on file    Frequency of Social Gatherings with Friends and Family: Not on file    Attends Restorationism Services: Not on file    Active Member of Clubs or Organizations: Not on file    Attends Club or Organization Meetings: Not on file    Marital Status: Not on file   Intimate Partner Violence:     Fear of Current or Ex-Partner: Not on file    Emotionally Abused: Not on file    Physically Abused: Not on file    Sexually Abused: Not on file   Housing Stability:     Unable to Pay for Housing in the Last Year: Not on file    Number of [place] in the Last Year: Not on file    Unstable Housing in the Last Year: Not on file       FAMILY HISTORY:     Family History   Problem Relation Age of Onset    Diabetes Sister     Heart Disease Sister     High Blood Pressure Sister     Diabetes Brother        ALLERGIES: Allergies as of 12/30/2021 - Fully Reviewed 12/30/2021   Allergen Reaction Noted    Food color pink Anaphylaxis 12/05/2014    Shellfish allergy Anaphylaxis 11/10/2016    Atorvastatin  07/02/2014    Ramipril  12/06/2012    Metformin Nausea And Vomiting 06/15/2017       MEDICATIONS:     No current facility-administered medications on file prior to encounter. Current Outpatient Medications on File Prior to Encounter   Medication Sig Dispense Refill    glipiZIDE (GLUCOTROL XL) 5 MG extended release tablet Take 1 tablet by mouth daily 30 tablet 1    LANTUS SOLOSTAR 100 UNIT/ML injection pen INJECT 10 UNITS INTO THE SKIN NIGHTLY 15 mL 0    omeprazole (PRILOSEC) 40 MG delayed release capsule TAKE 1 CAPSULE BY MOUTH EVERY MORNING (BEFORE BREAKFAST) 90 capsule 0    hydroCHLOROthiazide (HYDRODIURIL) 25 MG tablet TAKE 1 TABLET BY MOUTH DAILY 90 tablet 1    levothyroxine (SYNTHROID) 100 MCG tablet TAKE 1 TABLET BY MOUTH DAILY 90 tablet 1    Insulin Pen Needle (Appsee[employer] PEN NEEDLES 29G) 29G X 12MM MISC 1 each by Does not apply route daily 100 each 3    blood glucose test strips (ASCENSIA AUTODISC VI;ONE TOUCH ULTRA TEST VI) strip 1 each by In Vitro route daily As needed. 100 each 11    vitamin D 25 MCG (1000 UT) CAPS Take by mouth      aspirin 81 MG tablet Take 81 mg by mouth (Patient not taking: Reported on 9/21/2021)       REVIEW OF SYSTEMS:       10 point ROS completed. Pertinent positives in HPI, otherwise negative.      PHYSICAL EXAM:       Vitals:    01/05/22 0245   BP: (!) 171/80   Pulse: 82   Resp: 16   Temp: 96.5 °F (35.8 °C)   SpO2: 93%       General appearance: alert and cooperative  Head: Normocephalic, without obvious abnormality, atraumatic  Neck: No palpable lymphadenopathy in supraclavicular or cervical chains  Lungs: Clear to auscultation bilaterally, no audible rales, wheezes or crackles  Heart: Regular rate and rhythm, S1, S2 normal  Abdomen: Soft, non-tender; bowel sounds normal; no masses,  no organomegaly  Extremities: without cyanosis, clubbing, edema or asymmetry  Skin: No jaundice, purpura or petechiae      LABS:     Lab Results   Component Value Date    WBC 6.5 01/05/2022    HGB 10.1 (L) 01/05/2022    HCT 30.6 (L) 01/05/2022    MCV 92.2 01/05/2022     (H) 01/05/2022       Lab Results   Component Value Date    GLUCOSE 87 01/05/2022    BUN 7 01/05/2022    CREATININE 0.7 01/05/2022    K 2.9 (LL) 01/05/2022    BCR 19 02/20/2013    PHOS 2.7 01/04/2022       Lab Results   Component Value Date    ALKPHOS 82 12/31/2021    ALT 6 (L) 12/31/2021    AST 15 12/31/2021    BILITOT 0.4 12/31/2021    BILIDIR <0.2 12/31/2021    PROT 6.0 (L) 12/31/2021       IMAGING:     XR CHEST PORTABLE  Result Date: 12/30/2021  Multiple pulmonary nodules are observed throughout the lungs, new since 2014. There are also interstitial opacities bilaterally. Recommend a follow-up CT of the chest for further evaluation. An acute infectious or inflammatory process may be considered, but malignancy cannot be excluded. CT CHEST ABDOMEN PELVIS WO CONTRAST  Result Date: 12/31/2021  1. Multiple bilateral pulmonary nodules most consistent with metastatic disease. No acute pulmonary infiltrate. 2. Moderate distention of the colon with retained stool. Abrupt termination of the stool column in the sigmoid colon, likely an underlying malignancy. Nodularity in the adjacent mesenteric and retroperitoneal fat consistent with carcinomatosis. 3. No other evidence of abdominal or pelvic metastatic disease. Evaluation is limited by the lack of intravenous contrast. 4. Cholelithiasis with no acute features. 5. Colonic diverticulosis with no evidence of diverticulitis. 6. Large hiatal hernia. Mural thickening of the esophagus suggesting a reflux esophagitis.      PATHOLOGY:       Surgical Pathology 12/31/21 - pending    STAGING:     Cancer Staging  IV    ASSESSMENT:     Problem List Items Addressed This Visit       SBO (small bowel obstruction) ([facility] - Primary    Hypokalemia    * (Principal) Carcinomatosis (HCC)    Relevant Medications    acetaminophen (TYLENOL) tablet 650 mg    acetaminophen (TYLENOL) suppository 650 mg    HYDROmorphone (DILAUDID) injection 0.5 mg    oxyCODONE (ROXICODONE) immediate release tablet 5 mg    oxyCODONE (ROXICODONE) immediate release tablet 10 mg                  PLAN:     1. Sigmoid colon mass  2. Peritoneal carcinomatosis  3. Lung metastasis    - clinical picture suggests metastatic colon cancer  - POD 4 s/p emergent diverting colostomy and peritoneal biopsy for obstruction  - await pathology  - check CEA  - will need outpatient PET scan and Caris testing  - will need time to recover from surgery prior to any consideration of systemic therapy  - we did discuss treatment vs. Hospice - she seems certain she would like to try treatment and understands this is advanced disease    4.  Anemia    - suspected from colon cancer  - check iron studies    Ronnie Joseph MD

## 2022-01-05 LAB
ANION GAP SERPL CALCULATED.3IONS-SCNC: 8 MMOL/L (ref 3–16)
BASOPHILS ABSOLUTE: 0 K/UL (ref 0–0.2)
BASOPHILS RELATIVE PERCENT: 0.4 %
BUN BLDV-MCNC: 7 MG/DL (ref 7–20)
CALCIUM SERPL-MCNC: 8.1 MG/DL (ref 8.3–10.6)
CEA: 30.9 NG/ML (ref 0–5)
CHLORIDE BLD-SCNC: 102 MMOL/L (ref 99–110)
CO2: 31 MMOL/L (ref 21–32)
CREAT SERPL-MCNC: 0.7 MG/DL (ref 0.6–1.2)
EOSINOPHILS ABSOLUTE: 0.2 K/UL (ref 0–0.6)
EOSINOPHILS RELATIVE PERCENT: 2.7 %
FERRITIN: 70.8 NG/ML (ref 15–150)
GFR AFRICAN AMERICAN: >60
GFR NON-AFRICAN AMERICAN: >60
GLUCOSE BLD-MCNC: 127 MG/DL (ref 70–99)
GLUCOSE BLD-MCNC: 167 MG/DL (ref 70–99)
GLUCOSE BLD-MCNC: 78 MG/DL (ref 70–99)
GLUCOSE BLD-MCNC: 87 MG/DL (ref 70–99)
GLUCOSE BLD-MCNC: 96 MG/DL (ref 70–99)
HCT VFR BLD CALC: 30.6 % (ref 36–48)
HEMOGLOBIN: 10.1 G/DL (ref 12–16)
IRON SATURATION: 16 % (ref 15–50)
IRON: 23 UG/DL (ref 37–145)
LYMPHOCYTES ABSOLUTE: 1.1 K/UL (ref 1–5.1)
LYMPHOCYTES RELATIVE PERCENT: 17.2 %
MAGNESIUM: 1.6 MG/DL (ref 1.8–2.4)
MCH RBC QN AUTO: 30.3 PG (ref 26–34)
MCHC RBC AUTO-ENTMCNC: 32.9 G/DL (ref 31–36)
MCV RBC AUTO: 92.2 FL (ref 80–100)
MONOCYTES ABSOLUTE: 0.7 K/UL (ref 0–1.3)
MONOCYTES RELATIVE PERCENT: 10.1 %
NEUTROPHILS ABSOLUTE: 4.5 K/UL (ref 1.7–7.7)
NEUTROPHILS RELATIVE PERCENT: 69.6 %
PDW BLD-RTO: 13.7 % (ref 12.4–15.4)
PERFORMED ON: ABNORMAL
PERFORMED ON: ABNORMAL
PERFORMED ON: NORMAL
PERFORMED ON: NORMAL
PLATELET # BLD: 466 K/UL (ref 135–450)
PMV BLD AUTO: 6.9 FL (ref 5–10.5)
POTASSIUM REFLEX MAGNESIUM: 2.9 MMOL/L (ref 3.5–5.1)
RBC # BLD: 3.32 M/UL (ref 4–5.2)
SODIUM BLD-SCNC: 141 MMOL/L (ref 136–145)
TOTAL IRON BINDING CAPACITY: 144 UG/DL (ref 260–445)
WBC # BLD: 6.5 K/UL (ref 4–11)

## 2022-01-05 PROCEDURE — 6360000002 HC RX W HCPCS: Performed by: INTERNAL MEDICINE

## 2022-01-05 PROCEDURE — 97535 SELF CARE MNGMENT TRAINING: CPT

## 2022-01-05 PROCEDURE — C9113 INJ PANTOPRAZOLE SODIUM, VIA: HCPCS | Performed by: SURGERY

## 2022-01-05 PROCEDURE — 1200000000 HC SEMI PRIVATE

## 2022-01-05 PROCEDURE — 6370000000 HC RX 637 (ALT 250 FOR IP)

## 2022-01-05 PROCEDURE — 85025 COMPLETE CBC W/AUTO DIFF WBC: CPT

## 2022-01-05 PROCEDURE — 99024 POSTOP FOLLOW-UP VISIT: CPT | Performed by: NURSE PRACTITIONER

## 2022-01-05 PROCEDURE — 2580000003 HC RX 258: Performed by: SURGERY

## 2022-01-05 PROCEDURE — 6360000002 HC RX W HCPCS: Performed by: SURGERY

## 2022-01-05 PROCEDURE — 6370000000 HC RX 637 (ALT 250 FOR IP): Performed by: SURGERY

## 2022-01-05 PROCEDURE — 97530 THERAPEUTIC ACTIVITIES: CPT

## 2022-01-05 PROCEDURE — 83735 ASSAY OF MAGNESIUM: CPT

## 2022-01-05 PROCEDURE — 6370000000 HC RX 637 (ALT 250 FOR IP): Performed by: INTERNAL MEDICINE

## 2022-01-05 PROCEDURE — 80048 BASIC METABOLIC PNL TOTAL CA: CPT

## 2022-01-05 PROCEDURE — 36415 COLL VENOUS BLD VENIPUNCTURE: CPT

## 2022-01-05 RX ORDER — POTASSIUM CHLORIDE 750 MG/1
40 TABLET, EXTENDED RELEASE ORAL DAILY
Status: DISCONTINUED | OUTPATIENT
Start: 2022-01-05 | End: 2022-01-06 | Stop reason: HOSPADM

## 2022-01-05 RX ORDER — MAGNESIUM SULFATE IN WATER 40 MG/ML
2000 INJECTION, SOLUTION INTRAVENOUS ONCE
Status: COMPLETED | OUTPATIENT
Start: 2022-01-05 | End: 2022-01-05

## 2022-01-05 RX ORDER — LEVOTHYROXINE SODIUM 0.05 MG/1
TABLET ORAL
Status: COMPLETED
Start: 2022-01-05 | End: 2022-01-05

## 2022-01-05 RX ADMIN — SODIUM CHLORIDE, PRESERVATIVE FREE 10 ML: 5 INJECTION INTRAVENOUS at 09:39

## 2022-01-05 RX ADMIN — POTASSIUM CHLORIDE 10 MEQ: 7.45 INJECTION INTRAVENOUS at 15:48

## 2022-01-05 RX ADMIN — PANTOPRAZOLE SODIUM 40 MG: 40 INJECTION, POWDER, FOR SOLUTION INTRAVENOUS at 09:38

## 2022-01-05 RX ADMIN — INSULIN GLARGINE 10 UNITS: 100 INJECTION, SOLUTION SUBCUTANEOUS at 20:43

## 2022-01-05 RX ADMIN — SODIUM CHLORIDE, PRESERVATIVE FREE 10 ML: 5 INJECTION INTRAVENOUS at 09:40

## 2022-01-05 RX ADMIN — ENOXAPARIN SODIUM 40 MG: 100 INJECTION SUBCUTANEOUS at 09:37

## 2022-01-05 RX ADMIN — POTASSIUM CHLORIDE 10 MEQ: 7.45 INJECTION INTRAVENOUS at 12:07

## 2022-01-05 RX ADMIN — MAGNESIUM SULFATE HEPTAHYDRATE 2000 MG: 40 INJECTION, SOLUTION INTRAVENOUS at 16:08

## 2022-01-05 RX ADMIN — POTASSIUM CHLORIDE 10 MEQ: 7.45 INJECTION INTRAVENOUS at 18:48

## 2022-01-05 RX ADMIN — LEVOTHYROXINE SODIUM 100 MCG: 50 TABLET ORAL at 06:05

## 2022-01-05 RX ADMIN — POTASSIUM CHLORIDE 10 MEQ: 7.45 INJECTION INTRAVENOUS at 13:42

## 2022-01-05 RX ADMIN — POTASSIUM CHLORIDE 10 MEQ: 7.45 INJECTION INTRAVENOUS at 09:47

## 2022-01-05 RX ADMIN — MAGNESIUM GLUCONATE 500 MG ORAL TABLET 400 MG: 500 TABLET ORAL at 16:03

## 2022-01-05 RX ADMIN — SODIUM CHLORIDE, PRESERVATIVE FREE 10 ML: 5 INJECTION INTRAVENOUS at 20:43

## 2022-01-05 RX ADMIN — POTASSIUM CHLORIDE 10 MEQ: 7.45 INJECTION INTRAVENOUS at 10:55

## 2022-01-05 RX ADMIN — POTASSIUM CHLORIDE 40 MEQ: 750 TABLET, EXTENDED RELEASE ORAL at 15:49

## 2022-01-05 NOTE — PROGRESS NOTES
lab called with pt. critical level of K was 2.9   I will replace per PRN orders.     also mag was 1.6 no PRN orders noted for that perfect serve sent to MD.

## 2022-01-05 NOTE — PROGRESS NOTES
Shift assessment completed. Pt is alert and oriented. Vital signs are WNL. Respirations are even & easy. Pt complains of slight nausea but declines prn medication at this time. Colostomy with watery/loose brown stool noted. Midline is c,d and I and CAROLYNN. Bowel sounds present. Pt denies needs at this time. SR up x 2 and bed in low position. Call light is within reach. Will monitor.

## 2022-01-05 NOTE — CONSULTS
Pt seen for ostomy care and teaching. Pt now feeling more confident for discharge to home. Per pt, possible discharge tomorrow. Sitting up in chair. Agreeable to pouch change lesson. Pt able to remove old appliance, cleanse skin, and prepare new wafer. Verbalizes understanding of resizing stoma. Pt able to repouch self with some assistance due to inability to visualize distal peristomal skin in chair. Pt able to apply flex pouch to wafer. Pt is familiar with [place] supplies due to son. Provided with samples of shirley. Pt requesting to be enrolled into shirley secure start and Coloplast care for additional samples of both. Discussed supplies and suppliers. Pt provided with Edmond Santillan. Also provided with step by step typed instructions for pouch change with coloplast (below). Will order samples. Pt verbalizes an understanding of instructions provided. [facility]  Colostomy Care Instructions    Gather supplies listed below:  Scissors Throw away bag   [employer] Flex cut to fit wafer, stock #: [id number]   Pattern or measuring guide Coloplast Flex drainable pouch, stock #: [id number]            1. Using pattern provided, cut wafer to appropriate size. Remember to remeasure stoma for the first 4-6 weeks after surgery and adjust size of hole as needed. 2.  Remove old appliance and place in disposable bag, throw away. 3. Cleanse skin around stoma using plain water and a washcloth. If you choose to use soap, use Dial or [place] and rinse all residue off skin. Pat dry. 4. Remove backing on wafer apply over stoma to skin. Rub finger around stoma on wafer to help seal.    5. Remove backing on pouch. Line up adhesive to landing zone on wafer and attach.     6. Hold hand over wafer x 5 minutes to assist with seal.          Call Ostomy Nurse at [facility] for questions or problems  Willa Gomes [phone]

## 2022-01-05 NOTE — PLAN OF CARE
Problem: Discharge Planning:  Goal: Participates in care planning  Description: Participates in care planning  1/5/2022 0426 by Vinay Vo RN  Outcome: Ongoing  1/4/2022 1851 by Marge Murrell RN  Outcome: Ongoing  Goal: Discharged to appropriate level of care  Description: Discharged to appropriate level of care  1/5/2022 0426 by Vinay Vo RN  Outcome: Ongoing  1/4/2022 1851 by Marge Murrell RN  Outcome: Ongoing     Problem: Activity Intolerance:  Goal: Ability to tolerate increased activity will improve  Description: Ability to tolerate increased activity will improve  1/5/2022 0426 by Vinay Vo RN  Outcome: Ongoing  1/4/2022 1851 by Marge Murrell RN  Outcome: Ongoing     Problem: Anxiety/Stress:  Goal: Level of anxiety will decrease  Description: Level of anxiety will decrease  1/5/2022 0426 by Vinay Vo RN  Outcome: Ongoing  1/4/2022 1851 by Marge Murrell RN  Outcome: Ongoing     Problem:  Bowel Function - Altered:  Goal: Bowel elimination is within specified parameters  Description: Bowel elimination is within specified parameters  1/5/2022 0426 by Vinay Vo RN  Outcome: Ongoing  1/4/2022 1851 by Marge Murrell RN  Outcome: Ongoing     Problem: Fluid Volume - Deficit:  Goal: Absence of fluid volume deficit signs and symptoms  Description: Absence of fluid volume deficit signs and symptoms  1/5/2022 0426 by Vinay Vo RN  Outcome: Ongoing  1/4/2022 1851 by Marge Murrell RN  Outcome: Ongoing  Goal: Electrolytes within specified parameters  Description: Electrolytes within specified parameters  1/5/2022 0426 by Vinay Vo RN  Outcome: Ongoing  1/4/2022 1851 by Marge Murrell RN  Outcome: Ongoing     Problem: Mental Status - Impaired:  Goal: Absence of continued neurological deterioration signs and symptoms  Description: Absence of continued neurological deterioration signs and symptoms  1/5/2022 0426 by Vinay Vo RN  Outcome: Ongoing  1/4/2022 1851 by Marge Murrell specified parameters  Description: Ability to maintain appropriate glucose levels will improve to within specified parameters  1/5/2022 0426 by Niesha Oviedo RN  Outcome: Ongoing  1/4/2022 1851 by Macie Hernandes RN  Outcome: Ongoing     Problem: Skin Integrity - Impaired:  Goal: Will show no infection signs and symptoms  Description: Will show no infection signs and symptoms  1/5/2022 0426 by Niesha Oviedo RN  Outcome: Ongoing  1/4/2022 1851 by Macie Hernandes RN  Outcome: Ongoing  Goal: Absence of new skin breakdown  Description: Absence of new skin breakdown  1/5/2022 0426 by Niesha Oviedo RN  Outcome: Ongoing  1/4/2022 1851 by Macie Hernandes RN  Outcome: Ongoing     Problem: Sleep Pattern Disturbance:  Goal: Appears well-rested  Description: Appears well-rested  1/5/2022 0426 by Niesha Oviedo RN  Outcome: Ongoing  1/4/2022 1851 by Macie Hernandes RN  Outcome: Ongoing     Problem: Nutrition  Goal: Optimal nutrition therapy  1/5/2022 0426 by Niesha Oviedo RN  Outcome: Ongoing  1/4/2022 1851 by Macie Hernandes RN  Outcome: Ongoing  Goal: Understanding of nutritional guidelines  1/5/2022 0426 by Niesha Oviedo RN  Outcome: Ongoing  1/4/2022 1851 by Macie Hernandes RN  Outcome: Ongoing     Problem: Falls - Risk of:  Goal: Absence of physical injury  Description: Absence of physical injury  1/5/2022 0426 by Niesha Oviedo RN  Outcome: Ongoing  1/4/2022 1851 by Macie Hernandes RN  Outcome: Ongoing  Goal: Will remain free from falls  Description: Will remain free from falls  1/5/2022 0426 by Niesha Oviedo RN  Outcome: Ongoing  1/4/2022 1851 by Macie Hernandes RN  Outcome: Ongoing

## 2022-01-05 NOTE — PROGRESS NOTES
bed    Pain   No  Rating: NA  Location:NA  Pain Medicine Status: Denies need and No request made      Bed Mobility:   Supine to Sit:  Modified IND   Sit to Supine:  N/A  Rolling:           NA  Scooting:        IND    Transfer Training:   Sit to stand:   CGA, SBA  Stand to sit:  CGA, SBA  Bed to Chair:  CGA then SBA with RW  Bed to BSC:   CGA initially without AD. SBA with instruction later in session. Standard toilet:   N/A    Activity Tolerance   Pt completed therapy session with No adverse symptoms noted w/activity    ADL Training:   Upper body dressing:  N/A  Upper body bathing:  N/A  Lower body dressing:  Independent don/doff socks, setup to don/doff brief  Lower body bathing:  N/A  Toileting:   Supervision/SBA using [facility]  Grooming/Hygiene:  N/A    Therapeutic Exercise:   NA    Patient Education:   Role of OT  Safe RW use/hand placement, transfer technique    Positioning Needs:   Up in chair, call light and needs in reach. Family Present:  No    Assessment:Pt has made significant progress over the past few days. She ambulated approximately 150 with RW and CGA/SBA, without loss of balance noted. Reported fatigue with ambulation. Plans to keep son in respite for now until she is able to care for him. Is agreeable to home therapy at d/c. GOALS  1). Bed to toilet: Independent     To be met in 5 Visits:  1). Supine to/from Sit:             Independent  2). Upper Body Bathing:         Independent  3). Lower Body Bathing:         Independent  4). Upper Body Dressing:       Independent  5). Lower Body Dressing:       Independent  6).  Pt to demonstrate UE exs x 15 reps with minimal cues (goal met 1/4/21-increase to 20 reps)      Plan: cont with [facility] MS, OTR/L  #[id number]        If patient discharges from this facility prior to next visit, this note will serve as the Discharge Summary

## 2022-01-05 NOTE — DISCHARGE INSTR - COC
Continuity of Care Form    Patient Name: Luli Miles   :  1940  MRN:  [id number]    Admit date:  2021  Discharge date:  ***    Code Status Order: Full Code   Advance Directives:   Advance Care Flowsheet Documentation       Date/Time Healthcare Directive Type of Healthcare Directive Copy in [address] Agent's Name Healthcare Agent's Phone Number    21 1050 No, patient does not have an advance directive for healthcare treatment -- -- -- -- --            Admitting Physician:  Yvrose Kaminski MD  PCP: Saurabh Lomeli, APRN - CNP    Discharging Nurse: [facility] Unit/Room#: SD08/SD-08  Discharging Unit Phone Number: ***    Emergency Contact:   Extended Emergency Contact Information  Primary Emergency Contact: Pino Colin  Address: [address] [place] [address] Phone: [phone]  Relation: Child    Past Surgical History:  Past Surgical History:   Procedure Laterality Date    ANKLE FRACTURE SURGERY Right     [address] Right 2018     PHACO EMULSIFICATION OF CATARACT WITH INTRA OCULAR LENS IMPLANT RIGHT EYE    CATARACT REMOVAL WITH IMPLANT Left 2018    COLOSTOMY N/A 2021    EXPLORATORY LAPAROTOMY, DIVERTING LOOP COLOSTOMY, PERITONEAL BIOPSY performed by Shelly Son MD at [address] [place] [address] ARTHROSCOPY Right        Immunization History:   Immunization History   Administered Date(s) Administered    COVID-19, Pfizer, PF, 30mcg/0.3mL 2021, 2021    Influenza Virus Vaccine 2012    Influenza, High Dose (Fluzone 65 yrs and older) 2014, 11/10/2016    Influenza, Quadv, IM, (6 mo and older Fluzone, Flulaval, Fluarix and 3 yrs and older Afluria) 10/18/2018    Influenza, Quadv, IM, PF (6 mo and older Fluzone, Flulaval, Fluarix, and 3 yrs and PHLO:620229488}  Dressing  {CHP DME WKLN:803533473}  Toileting  {CHP DME UNTW:387965028}  Feeding  {CHP DME ZZBL:112774092}  Med Admin  {CHP DME YXEX:075021034}  Med Delivery   { CASANDRA MED Delivery:908291309}    Wound Care Documentation and Therapy:        Elimination:  Continence: Bowel: {YES / BE:60369}  Bladder: {YES / NB:49840}  Urinary Catheter: {Urinary Catheter:563095288}   Colostomy/Ileostomy/Ileal Conduit: {YES / TO:60748}  Colostomy LLQ Loop-Stomal Appliance: 2 piece  Colostomy LLQ Loop-Stoma  Assessment: Red  Colostomy LLQ Loop-Mucocutaneous Junction: Intact  Colostomy LLQ Loop-Peristomal Assessment: Clean  Colostomy LLQ Loop-Treatment: Liquid skin barrier,Bag change,Site care,Stoma powder  Colostomy LLQ Loop-Stool Appearance: Loose  Colostomy LLQ Loop-Stool Color: Brown  Colostomy LLQ Loop-Stool Amount: Medium  Colostomy LLQ Loop-Output (mL): 50 ml      [facility]  Colostomy Care Instructions    Gather supplies listed below:  Scissors Throw away bag   [employer] Flex cut to fit wafer, stock #: [id number]   Pattern or measuring guide Coloplast Flex drainable pouch, stock #: [id number]            Using pattern provided, cut wafer to appropriate size. Remember to remeasure stoma for the first 4-6 weeks after surgery and adjust size of hole as needed. Remove old appliance and place in disposable bag, throw away. Cleanse skin around stoma using plain water and a washcloth. If you choose to use soap, use Dial or [place] and rinse all residue off skin. Pat dry. Remove backing on wafer apply over stoma to skin. Rub finger around stoma on wafer to help seal.    Remove backing on pouch. Line up adhesive to landing zone on wafer and attach.     Hold hand over wafer x 5 minutes to assist with seal.          Call Ostomy Nurse at [facility] for questions or problems  Mickeal Kristyn [phone]    Intake/Output Summary (Last 24 hours) at 1/5/2022 1408  Last data filed at 1/5/2022 1013  Gross per 24 hour   Intake 360 ml   Output 275 ml   Net 85 ml     I/O last 3 completed shifts:  In: -   Out: 400 [Urine:300; Stool:100]    Safety Concerns:     [address] [employer] Safety Concerns:460675408}    Impairments/Disabilities:      [address] [employer] Impairments/Disabilities:677976053}    Nutrition Therapy:  Current Nutrition Therapy:   [address] [employer] Diet List:850760550}    Routes of Feeding: {CHP DME Other Feedings:230261532}  Liquids: {Slp liquid thickness:82515}  Daily Fluid Restriction: {CHP DME Yes amt example:252866471}  Last Modified Barium Swallow with Video (Video Swallowing Test): {Done Not Done YBPE:990627415}    Treatments at the Time of Hospital Discharge:   Respiratory Treatments: ***  Oxygen Therapy:  {Therapy; copd oxygen:94407}  Ventilator:    { CC Vent YXSM:765936340}    Rehab Therapies: {THERAPEUTIC INTERVENTION:1688721646}  Weight Bearing Status/Restrictions: { CC Weight Bearin}  Other Medical Equipment (for information only, NOT a DME order):  {EQUIPMENT:887890422}  Other Treatments: ***    Patient's personal belongings (please select all that are sent with patient):  {[facility] DME Belongings:685847886}    RN SIGNATURE:  {Esignature:642382980}    CASE MANAGEMENT/SOCIAL WORK SECTION    Inpatient Status Date: 22    Readmission Risk Assessment Score:  Readmission Risk              Risk of Unplanned Readmission:  21           Discharging to Facility/ Agency   Name: Vonda Mcclellan  Phone: [phone]  Fax:[phone]    / signature: Electronically signed by Lauren Logan RN on 22 at 10:17 AM EST    PHYSICIAN SECTION    Prognosis: Good    Condition at Discharge: Stable    Rehab Potential (if transferring to Rehab): Good    Recommended Labs or Other Treatments After Discharge: ostomy home care.      Physician Certification: I certify the above information and transfer of Ana Moran  is necessary for the continuing treatment of the diagnosis listed and that she requires Home Care for less 30 days.      Update Admission H&P: {CHP DME Changes in LMRFF:049394410}    PHYSICIAN SIGNATURE:  Electronically signed by VO Dr. Juanita Bright RN on 1/6/22 at 11:48 AM EST

## 2022-01-05 NOTE — PROGRESS NOTES
Progress Note    Admit Date:  12/30/2021    The patient is a [de-identified] y.o. female past medical history chronic kidney disease, GERD, hypertension, hypothyroidism, hyperlipidemia, obesity, type 2 diabetes,  aortic valve sclerosis without stenosis who presents to [facility] with complaint of abdominal pain. In ED she was noted to be hypokalemic. Chest x-ray shows multiple pulmonary nodules. 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 Patient admitted and general surgery consulted. S/P Exploratory laparotomy with diverting loop sigmoid colostomy  and peritoneal biopsy. POD #5    Subjective:  Ms. Adam Chapa feels okay. She is tolerating full liquid diet. ->  Diet being advanced to regular diet today. Good ostomy output. She is starting to feel better, would like to work more with therapy. Biopsy results still pending  -patient has been seen by oncology . Pain controlled. Electrolytes being replaced. Objective:   Patient Vitals for the past 4 hrs:   BP Temp Temp src Pulse Resp SpO2   01/05/22 0924 137/86 96.9 °F (36.1 °C) Oral 84 18 94 %            Intake/Output Summary (Last 24 hours) at 1/5/2022 1112  Last data filed at 1/5/2022 1013  Gross per 24 hour   Intake 360 ml   Output 275 ml   Net 85 ml       Physical Exam:  Gen: No distress. Alert. Eyes: PERRL. No sclera icterus. No conjunctival injection. ENT: No discharge. Pharynx clear. Neck: No JVD. Trachea midline. Resp: No accessory muscle use. No crackles. No wheezes. No rhonchi. CV: Regular rate. Regular rhythm. +2/6 TROY. No rub. Trace BLE edema. GI: Tender, Non-distended. Active bowel sounds. No hernia. + colostomy  Skin: Warm and dry. Surgical incision mid abdomen, staples. Surgical dressing. M/S: No cyanosis. No joint deformity. No clubbing. Neuro: Awake. Grossly nonfocal    Psych: Oriented x 3. No anxiety or agitation.      Data:  CBC:   Recent Labs 01/03/22  1681 01/04/22  0629 01/05/22  0648   WBC 7.9 7.4 6.5   HGB 10.2* 10.4* 10.1*   HCT 30.6* 31.5* 30.6*   MCV 89.8 90.6 92.2    490* 466*     BMP:   Recent Labs     01/03/22  0605 01/03/22  0608 01/04/22  0629 01/05/22  0648   NA  --  139 139 141   K  --  3.2* 3.2* 2.9*   CL  --  101 101 102   CO2  --  30 31 31   PHOS 1.8*  --  2.7  --    BUN  --  9 9 7   CREATININE  --  0.7 0.7 0.7     LIVER PROFILE:   No results for input(s): AST, ALT, LIPASE, BILIDIR, BILITOT, ALKPHOS in the last 72 hours. Invalid input(s): AMYLASE,  ALB  PT/INR: No results for input(s): PROTIME, INR in the last 72 hours. CULTURES  SARS-CoV-2 RNA, RT PCR NOT DETECTED        INFLUENZA A NOT DETECTED     INFLUENZA B NOT DETECTED           RADIOLOGY  CT CHEST ABDOMEN PELVIS WO CONTRAST   Final Result   1. Multiple bilateral pulmonary nodules most consistent with metastatic   disease. No acute pulmonary infiltrate. 2. Moderate distention of the colon with retained stool. Abrupt termination   of the stool column in the sigmoid colon, likely an underlying malignancy. Nodularity in the adjacent mesenteric and retroperitoneal fat consistent with   carcinomatosis. 3. No other evidence of abdominal or pelvic metastatic disease. Evaluation   is limited by the lack of intravenous contrast.   4. Cholelithiasis with no acute features. 5. Colonic diverticulosis with no evidence of diverticulitis. 6. Large hiatal hernia. Mural thickening of the esophagus suggesting a   reflux esophagitis. XR CHEST PORTABLE   Final Result   Multiple pulmonary nodules are observed throughout the lungs, new since 2014. There are also interstitial opacities bilaterally. Recommend a follow-up CT   of the chest for further evaluation. An acute infectious or inflammatory   process may be considered, but malignancy cannot be excluded.              Echo 2/2020  Summary   Normal left ventricle size and systolic function with a visually estimated   ejection fraction of 55%.   Mild septal hypertrophy.   No regional wall motion abnormalities are seen.   Normal left ventricular diastolic filling pressure.   Aortic valve sclerosis without stenosis.   Mild mitral regurgitation.   Systolic pulmonary artery pressure (SPAP) is normal and estimated at 25 mmHg   (right atrial pressure 8 mmHg). Procedures:  Exploratory laparotomy with diverting loop sigmoid colostomy  and peritoneal biopsy. Biopsy results pending      Assessment/Plan:  Abdominal Pain  N/V  Obstructing distal colon mass with possible metastatic disease  - CT as above -with peritoneal carcinomatosis and lung metastasis  - General surgery consulted  - S/p Exploratory laparotomy with diverting loop sigmoid colostomy  and peritoneal biopsy on 12/31. POD #5  -Biopsy results pending  - consulted  Oncology-> seen by Dr. Fields Salvage   - NPO--> Clear liquids--> Full liquids->Tolerating well. Diet will be advanced to regular diet today  - IVF stopped. - Nausea and pain controlled. -Patient stable from surgical standpoint for discharge. Patient will need outpatient follow-up with oncology for PET scan. Biopsy results pending, CEA results pending.         Hypokalemia  Hypomagnesemia   - 2/2 nausea and diarrhea  Continue to replace. GERD   Large Hiatal Hernia  - CT showed possible reflux esphogitis  - continue IV PPI     Diabetes Mellitus type 2  -controlled  - Continue Lantus  - monitor blood sugars      History of Iron deficiency anemia  - hemoglobin 10.7  - check iron studies  - patient is not on iron at home     HTN  - controlled holding HCTZ at this time  - monitor blood pressure     Hypothyroidism  - home dose of synthroid 100 mcg       Aortic valve sclerosis without stenosis  - +murmur  - echo as above     Prolonged QTc  - 490  - Avoid QT prolonging agents as able.      Moderate PCM   -increase nutritional supplements     Weakness and debility  -seen by PT OT , initially they were recommending SNF . Strength is improving now . Continue therapy today . Likely plan is for discharge home with home health care in a.m. DVT Prophylaxis: Lovenox   code status was addressed with patient this admission. Patient would like to remain a full code, she stated next of Kin would be [facility] #[phone]. Diet: ADULT ORAL NUTRITION SUPPLEMENT; Dinner, Lunch, Breakfast; Clear Liquid Oral Supplement  ADULT DIET; Dysphagia - Soft and Bite Sized  Code Status: Full Code       Advance diet today, replete electrolytes, continue PT OT. DC planning for pat Hughes MD  1/5/2022

## 2022-01-05 NOTE — PROGRESS NOTES
General Surgery - Mary Woodall, APRN - CNP, CNP  Daily Progress Note    Pt Name: Stephanie Pfeiffer Record Number: [id number]  Date of Birth 1940   Today's Date: 1/5/2022    ASSESSMENT  1. POD #5 s/p ex lap with diverting loop sigmoid colostomy and peritoneal biopsy  2. ABD: soft, + mild distention, staples look with good some ecchymosis, stoma:red, moist, + flatus, + stool, no N/V,   3. K+ 3.2->2. 9: on protocol replacement  4. Mg 1.5->1.6  5. Ostomy : only 50 ml recorded, ostomy bag with stool and gas  6. VSS  7. Path: colon cancer with carcinomatosis   8. Pt states \"I feel better today, I may try to go home, I would like more things to eat. \"     PLAN  1. Ostomy RN  2. PT/OT: ambulate pt  3. DVT proph: Lovenox  4. Oral pain meds  5. Soft diet with Ensure  6. Pt looks good POD #5: Pt appears improved. She is having bowel function and tolerating a diet. Hopefully home in 1-2 days with ostomy care from a surgical standpoint. Eloisa Lora has improved from yesterday. Pain is well controlled. She has no nausea and no vomiting. She has passed flatus and has had a bowel movement. She is tolerating full liquids. Current activity is up with assistance    OBJECTIVE  VITALS:  height is 5' 7\" (1.702 m) and weight is 165 lb (74.8 kg). Her oral temperature is 96.9 °F (36.1 °C). Her blood pressure is 137/86 and her pulse is 84. Her respiration is 18 and oxygen saturation is 94%.    VITALS:  /86   Pulse 84   Temp 96.9 °F (36.1 °C) (Oral)   Resp 18   Ht 5' 7\" (1.702 m)   Wt 165 lb (74.8 kg)   SpO2 94%   BMI 25.84 kg/m²   INTAKE/OUTPUT:      Intake/Output Summary (Last 24 hours) at 1/5/2022 1106  Last data filed at 1/5/2022 1013  Gross per 24 hour   Intake 360 ml   Output 275 ml   Net 85 ml     GENERAL: alert, cooperative, no distress  I/O last 3 completed shifts:  In: -   Out: 400 [Urine:300; Stool:100]  I/O this shift:  In: 360 [P.O.:360]  Out: 275 [Urine:225; Stool:50]    LABS  Recent Labs     01/04/22  0629 01/04/22  0629 01/05/22  0648   WBC 7.4   < > 6.5   HGB 10.4*   < > 10.1*   HCT 31.5*   < > 30.6*   *   < > 466*      < > 141   K 3.2*   < > 2.9*      < > 102   CO2 31   < > 31   BUN 9   < > 7   CREATININE 0.7   < > 0.7   MG 1.50*   < > 1.60*   PHOS 2.7  --   --    CALCIUM 8.0*   < > 8.1*    < > = values in this interval not displayed.      CBC with Differential:    Lab Results   Component Value Date    WBC 6.5 01/05/2022    RBC 3.32 01/05/2022    HGB 10.1 01/05/2022    HCT 30.6 01/05/2022     01/05/2022    MCV 92.2 01/05/2022    MCH 30.3 01/05/2022    MCHC 32.9 01/05/2022    RDW 13.7 01/05/2022    SEGSPCT 61.2 05/22/2013    LYMPHOPCT 17.2 01/05/2022    MONOPCT 10.1 01/05/2022    BASOPCT 0.4 01/05/2022    MONOSABS 0.7 01/05/2022    LYMPHSABS 1.1 01/05/2022    EOSABS 0.2 01/05/2022    BASOSABS 0.0 01/05/2022     CMP:    Lab Results   Component Value Date     01/05/2022    K 2.9 01/05/2022     01/05/2022    CO2 31 01/05/2022    BUN 7 01/05/2022    CREATININE 0.7 01/05/2022    GFRAA >60 01/05/2022    AGRATIO 0.9 12/30/2021    LABGLOM >60 01/05/2022    LABGLOM 59 02/20/2013    GLUCOSE 87 01/05/2022    PROT 6.0 12/31/2021    PROT 7.3 02/20/2013    LABALBU 2.9 12/31/2021    CALCIUM 8.1 01/05/2022    BILITOT 0.4 12/31/2021    ALKPHOS 82 12/31/2021    AST 15 12/31/2021    ALT 6 12/31/2021         PILY Nelson - CNP  Electronically signed 1/5/2022 at 11:06 AM

## 2022-01-05 NOTE — FLOWSHEET NOTE
01/05/22 0924   Vitals   Temp 96.9 °F (36.1 °C)   Temp Source Oral   Pulse 84   Heart Rate Source Monitor   Resp 18   /86   BP Method Automatic   Patient Position Semi fowlers   Level of Consciousness Alert (0)   MEWS Score 1   Oxygen Therapy   SpO2 94 %   O2 Device None (Room air)   Shift assessment complete. Pt K is infusing per PRN orders pt tolerating well  Spoke with DR Blevins Board stated when pt gets D/C will needs to F/U with DR Cooper, placed in AVS,  and depending on eval from therapy may be D/C tomorrow. Place call to CM to notify of plan . The bed is locked and is in the lowest position. Call light and bedside table are within reach.

## 2022-01-06 VITALS
TEMPERATURE: 98.5 F | DIASTOLIC BLOOD PRESSURE: 68 MMHG | HEART RATE: 81 BPM | OXYGEN SATURATION: 95 % | BODY MASS INDEX: 25.9 KG/M2 | HEIGHT: 67 IN | WEIGHT: 165 LBS | SYSTOLIC BLOOD PRESSURE: 134 MMHG | RESPIRATION RATE: 18 BRPM

## 2022-01-06 LAB
ALBUMIN SERPL-MCNC: 2.3 G/DL (ref 3.4–5)
ALP BLD-CCNC: 58 U/L (ref 40–129)
ALT SERPL-CCNC: <5 U/L (ref 10–40)
ANION GAP SERPL CALCULATED.3IONS-SCNC: 8 MMOL/L (ref 3–16)
AST SERPL-CCNC: 11 U/L (ref 15–37)
BASOPHILS ABSOLUTE: 0 K/UL (ref 0–0.2)
BASOPHILS RELATIVE PERCENT: 0.3 %
BILIRUB SERPL-MCNC: 0.3 MG/DL (ref 0–1)
BILIRUBIN DIRECT: <0.2 MG/DL (ref 0–0.3)
BILIRUBIN, INDIRECT: ABNORMAL MG/DL (ref 0–1)
BUN BLDV-MCNC: 6 MG/DL (ref 7–20)
CALCIUM SERPL-MCNC: 8.4 MG/DL (ref 8.3–10.6)
CHLORIDE BLD-SCNC: 103 MMOL/L (ref 99–110)
CO2: 29 MMOL/L (ref 21–32)
CREAT SERPL-MCNC: 0.6 MG/DL (ref 0.6–1.2)
EOSINOPHILS ABSOLUTE: 0.1 K/UL (ref 0–0.6)
EOSINOPHILS RELATIVE PERCENT: 2.2 %
GFR AFRICAN AMERICAN: >60
GFR NON-AFRICAN AMERICAN: >60
GLUCOSE BLD-MCNC: 104 MG/DL (ref 70–99)
GLUCOSE BLD-MCNC: 153 MG/DL (ref 70–99)
GLUCOSE BLD-MCNC: 189 MG/DL (ref 70–99)
GLUCOSE BLD-MCNC: 263 MG/DL (ref 70–99)
GLUCOSE BLD-MCNC: 80 MG/DL (ref 70–99)
GLUCOSE BLD-MCNC: 95 MG/DL (ref 70–99)
HCT VFR BLD CALC: 29.6 % (ref 36–48)
HEMOGLOBIN: 9.9 G/DL (ref 12–16)
LYMPHOCYTES ABSOLUTE: 1.3 K/UL (ref 1–5.1)
LYMPHOCYTES RELATIVE PERCENT: 21 %
MAGNESIUM: 1.7 MG/DL (ref 1.8–2.4)
MCH RBC QN AUTO: 30.2 PG (ref 26–34)
MCHC RBC AUTO-ENTMCNC: 33.5 G/DL (ref 31–36)
MCV RBC AUTO: 90.2 FL (ref 80–100)
MONOCYTES ABSOLUTE: 0.7 K/UL (ref 0–1.3)
MONOCYTES RELATIVE PERCENT: 11.1 %
NEUTROPHILS ABSOLUTE: 4 K/UL (ref 1.7–7.7)
NEUTROPHILS RELATIVE PERCENT: 65.4 %
PDW BLD-RTO: 14.3 % (ref 12.4–15.4)
PERFORMED ON: ABNORMAL
PERFORMED ON: NORMAL
PHOSPHORUS: 2.7 MG/DL (ref 2.5–4.9)
PLATELET # BLD: 459 K/UL (ref 135–450)
PMV BLD AUTO: 6.1 FL (ref 5–10.5)
POTASSIUM REFLEX MAGNESIUM: 3.5 MMOL/L (ref 3.5–5.1)
RBC # BLD: 3.28 M/UL (ref 4–5.2)
SODIUM BLD-SCNC: 140 MMOL/L (ref 136–145)
TOTAL PROTEIN: 5.4 G/DL (ref 6.4–8.2)
WBC # BLD: 6.1 K/UL (ref 4–11)

## 2022-01-06 PROCEDURE — 97110 THERAPEUTIC EXERCISES: CPT

## 2022-01-06 PROCEDURE — 6360000002 HC RX W HCPCS: Performed by: SURGERY

## 2022-01-06 PROCEDURE — 36415 COLL VENOUS BLD VENIPUNCTURE: CPT

## 2022-01-06 PROCEDURE — 99238 HOSP IP/OBS DSCHRG MGMT 30/<: CPT | Performed by: INTERNAL MEDICINE

## 2022-01-06 PROCEDURE — C9113 INJ PANTOPRAZOLE SODIUM, VIA: HCPCS | Performed by: SURGERY

## 2022-01-06 PROCEDURE — 2580000003 HC RX 258: Performed by: SURGERY

## 2022-01-06 PROCEDURE — 80076 HEPATIC FUNCTION PANEL: CPT

## 2022-01-06 PROCEDURE — 6370000000 HC RX 637 (ALT 250 FOR IP): Performed by: NURSE PRACTITIONER

## 2022-01-06 PROCEDURE — 80048 BASIC METABOLIC PNL TOTAL CA: CPT

## 2022-01-06 PROCEDURE — 99024 POSTOP FOLLOW-UP VISIT: CPT | Performed by: NURSE PRACTITIONER

## 2022-01-06 PROCEDURE — 83735 ASSAY OF MAGNESIUM: CPT

## 2022-01-06 PROCEDURE — 84100 ASSAY OF PHOSPHORUS: CPT

## 2022-01-06 PROCEDURE — 84238 ASSAY NONENDOCRINE RECEPTOR: CPT

## 2022-01-06 PROCEDURE — 85025 COMPLETE CBC W/AUTO DIFF WBC: CPT

## 2022-01-06 PROCEDURE — 6370000000 HC RX 637 (ALT 250 FOR IP): Performed by: INTERNAL MEDICINE

## 2022-01-06 PROCEDURE — 97116 GAIT TRAINING THERAPY: CPT

## 2022-01-06 RX ORDER — POTASSIUM CHLORIDE 750 MG/1
10 TABLET, EXTENDED RELEASE ORAL DAILY
Qty: 30 TABLET | Refills: 0 | Status: SHIPPED | OUTPATIENT
Start: 2022-01-06 | End: 2022-05-19 | Stop reason: SDUPTHER

## 2022-01-06 RX ADMIN — POTASSIUM CHLORIDE 40 MEQ: 750 TABLET, EXTENDED RELEASE ORAL at 08:55

## 2022-01-06 RX ADMIN — PANTOPRAZOLE SODIUM 40 MG: 40 INJECTION, POWDER, FOR SOLUTION INTRAVENOUS at 08:55

## 2022-01-06 RX ADMIN — MAGNESIUM GLUCONATE 500 MG ORAL TABLET 400 MG: 500 TABLET ORAL at 08:55

## 2022-01-06 RX ADMIN — ENOXAPARIN SODIUM 40 MG: 100 INJECTION SUBCUTANEOUS at 08:55

## 2022-01-06 RX ADMIN — SODIUM CHLORIDE, PRESERVATIVE FREE 10 ML: 5 INJECTION INTRAVENOUS at 08:56

## 2022-01-06 RX ADMIN — INSULIN LISPRO 1 UNITS: 100 INJECTION, SOLUTION INTRAVENOUS; SUBCUTANEOUS at 12:17

## 2022-01-06 RX ADMIN — LEVOTHYROXINE SODIUM 100 MCG: 50 TABLET ORAL at 06:21

## 2022-01-06 RX ADMIN — SODIUM CHLORIDE, PRESERVATIVE FREE 10 ML: 5 INJECTION INTRAVENOUS at 08:55

## 2022-01-06 NOTE — CARE COORDINATION
DISCHARGE ORDER  Date/Time 2022 11:44 AM  Completed by: Johnathan Henry RN, Case Management    Patient Name: Valerie Ayers      : 1940  Admitting Diagnosis: Hypokalemia [E87.6]  SBO (small bowel obstruction) ([address] [K56.609]  Carcinomatosis ([address] [C80.0]      Admit order Date and Status:21  (verify MD's last order for status of admission)      Noted discharge order. If applicable PT/OT recommendation at Discharge: home with PRN assist and home PT/OT  DME recommendation by PT/OT:RW  Confirmed discharge plan: Yes  with Pt  If pt confirmed DC plan does family need to be contacted by CM No  Discharge Plan: home with [facility] SN/PT/OT. Reviewed chart. Role of discharge planner explained and patient verbalized understanding. Discharge order is noted. Has Home O2 in place on admit:  No  Informed of need to bring portable home O2 tank on day of discharge for nursing to connect prior to leaving:   Not Indicated  Verbalized agreement/Understanding:   Not Indicated  Pt is being d/c'd to home today. Pt's O2 sats are 93% on RA. Discharge timeout done with all disciplines. All discharge needs and concerns addressed.

## 2022-01-06 NOTE — PROGRESS NOTES
See PM shift assessment. Blood sugar 127. Patient states she is going home tomorrow. Tolerating reg diet well so far. Stool noted in colostomy bag. Denies need for pain medication. IV infusing. Call light in reach.

## 2022-01-06 NOTE — PROGRESS NOTES
Perfect serve sent to MD R/T clarification on pt  K  IV/ PO and Mag IV / PO . MD wants pt to receive both today of both med's and will  repeat  labs in AM .

## 2022-01-06 NOTE — PROGRESS NOTES
Discharge order received. Patient informed of discharge order. Discharge instructions reviewed with patient, verbalized understanding, denies needs or questions at this time. All patient belongings packed and sent with patient upon discharge along with ostomy supplies and walker. Staff transported pt out to waiting car.

## 2022-01-06 NOTE — PLAN OF CARE
Problem: Discharge Planning:  Goal: Participates in care planning  Description: Participates in care planning  Outcome: Ongoing     Problem: Activity Intolerance:  Goal: Ability to tolerate increased activity will improve  Description: Ability to tolerate increased activity will improve  1/6/2022 0922 by Mell García RN  Outcome: Ongoing  1/5/2022 1926 by Mell García RN  Outcome: Ongoing     Problem:  Bowel Function - Altered:  Goal: Bowel elimination is within specified parameters  Description: Bowel elimination is within specified parameters  1/6/2022 0922 by Mell García RN  Outcome: Ongoing

## 2022-01-06 NOTE — PLAN OF CARE
Problem: Activity Intolerance:  Goal: Ability to tolerate increased activity will improve  Description: Ability to tolerate increased activity will improve  Outcome: Ongoing     Problem:  Bowel Function - Altered:  Goal: Bowel elimination is within specified parameters  Description: Bowel elimination is within specified parameters  Outcome: Ongoing

## 2022-01-06 NOTE — DISCHARGE SUMMARY
Name:  Olive Marinelli  Room:  0214/0214-01  MRN:    [id number]    Discharge Summary      This discharge summary is in conjunction with a complete physical exam done on the day of discharge. Discharging Physician: Lauren Serrano MD      Admit: 12/30/2021  Discharge:   1/6/2022     Diagnoses this Admission      Principal Problem:    Carcinomatosis [facility])  Active Problems:    Type 2 diabetes mellitus with hyperglycemia (HCC)    Hypothyroidism    Heart murmur    Iron deficiency anemia    SBO (small bowel obstruction) (HCC)    Colonic mass    Moderate protein-calorie malnutrition (HCC)  Resolved Problems:    * No resolved hospital problems. *      Procedures (Please Review Full Report for Details)  DATE OF PROCEDURE:  12/31/2021     PREOPERATIVE DIAGNOSIS:  Obstructing rectosigmoid mass.     POSTOPERATIVE DIAGNOSIS:  Obstructing rectosigmoid mass.     PROCEDURE:  Exploratory laparotomy with diverting loop sigmoid colostomy  and peritoneal biopsy.     ANESTHESIA:  General.     SURGEON:  Jake Wild. MD Bhakti     ESTIMATED BLOOD LOSS:  Less than 100 mL. Consults    IP CONSULT TO HOSPITALIST  IP CONSULT TO GENERAL SURGERY  IP CONSULT TO ONCOLOGY  IP CONSULT TO CASE MANAGEMENT  IP CONSULT TO HOME CARE NEEDS      HPI:    The patient is a [de-identified] y.o. female past medical history chronic kidney disease, GERD, hypertension, hypothyroidism, hyperlipidemia, obesity, type 2 diabetes,  aortic valve sclerosis without stenosis who presents to [facility] with complaint of abdominal pain. History obtained from the patient and review of EMR. Patient stated she has had abdominal pain and intermittent diarrhea over the last month. Over the last week she has noted abdominal pain left lower quadrant as well as right upper quadrant. She has had some associated nausea over the last 2 days,  but no vomiting. Pt stated that she takes care of her son who is has a trach and is actually in the ED now and tested positive for COVID. Pt PCR tested negative. She denies any shortness of breath, chest pain, palpitations, dizziness, or orthopnea. Patient stated that she has been under anesthesia in the past with no complications.      In ED she was noted to be hypokalemic. Chest x-ray shows multiple pulmonary nodules. 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 Patient admitted and general surgery consulted    Physical Exam at Discharge:  BP (!) 161/69   Pulse 83   Temp 98.6 °F (37 °C) (Oral)   Resp 18   Ht 5' 7\" (1.702 m)   Wt 165 lb (74.8 kg)   SpO2 93%   BMI 25.84 kg/m²     Gen: No distress. Alert. Eyes: PERRL. No sclera icterus. No conjunctival injection. ENT: No discharge. Pharynx clear. Neck: No JVD.   Trachea midline. Resp: No accessory muscle use. No crackles. No wheezes. No rhonchi. CV: Regular rate. Regular rhythm. +2/6 TROY.  No rub. Trace BLE edema. GI: Tender, Non-distended. Active bowel sounds. No hernia. + colostomy  Skin: Warm and dry. Surgical incision mid abdomen, staples. Surgical dressing. M/S: No cyanosis. No joint deformity. No clubbing. Neuro: Awake. Grossly nonfocal    Psych: Oriented x 3. No anxiety or agitation    Hospital Course  Abdominal Pain  N/V  Obstructing distal colon mass with possible metastatic disease  - CT as above -with peritoneal carcinomatosis and lung metastasis  - General surgery consulted  - S/p Exploratory laparotomy with diverting loop sigmoid colostomy  and peritoneal biopsy on 12/31. POD #5  -Biopsy results pending  - consulted  Oncology-> seen by Dr. Meena Wing   - NPO--> Clear liquids--> Full liquids->Tolerating well. Diet will be advanced to regular diet today  - IVF stopped. - Nausea and pain controlled. -Patient stable from surgical standpoint for discharge.   -Patient will need outpatient follow-up with oncology for PET scan.   Biopsy results pending, CEA results pending.          Hypokalemia  Hypomagnesemia   - 2/2 nausea and diarrhea  Continue to replace.      GERD   Large Hiatal Hernia  - CT showed possible reflux esphogitis  - continue IV PPI     Diabetes Mellitus type 2  -controlled  - Continue Lantus  - monitor blood sugars      History of Iron deficiency anemia  - hemoglobin 10.7  - check iron studies  - patient is not on iron at home     HTN  - controlled holding HCTZ at this time  - monitor blood pressure     Hypothyroidism  - home dose of synthroid 100 mcg       Aortic valve sclerosis without stenosis  - +murmur  - echo as above     Prolonged QTc  - 490  - Avoid QT prolonging agents as able.      Moderate PCM   -increase nutritional supplements      Weakness and debility  -seen by PT OT , initially they were recommending SNF . Strength is improving now . Continue therapy today .        CBC:   Recent Labs     01/04/22 0629 01/05/22 0648 01/06/22 0522   WBC 7.4 6.5 6.1   HGB 10.4* 10.1* 9.9*   HCT 31.5* 30.6* 29.6*   MCV 90.6 92.2 90.2   * 466* 459*     BMP:   Recent Labs     01/04/22 0629 01/05/22 0648 01/06/22 0522    141 140   K 3.2* 2.9* 3.5    102 103   CO2 31 31 29   PHOS 2.7  --  2.7   BUN 9 7 6*   CREATININE 0.7 0.7 0.6     LIVER PROFILE:   Recent Labs     01/06/22 0522   AST 11*   ALT <5*   BILIDIR <0.2   BILITOT 0.3   ALKPHOS 58     PT/INR: No results for input(s): PROTIME, INR in the last 72 hours. APTT: No results for input(s): APTT in the last 72 hours. UA:No results for input(s): NITRITE, COLORU, PHUR, LABCAST, WBCUA, RBCUA, MUCUS, TRICHOMONAS, YEAST, BACTERIA, CLARITYU, SPECGRAV, LEUKOCYTESUR, UROBILINOGEN, BILIRUBINUR, BLOODU, GLUCOSEU, AMORPHOUS in the last 72 hours. Invalid input(s): KETONESU        CT CHEST ABDOMEN PELVIS WO CONTRAST   Final Result   1. Multiple bilateral pulmonary nodules most consistent with metastatic   disease. No acute pulmonary infiltrate. 2. Moderate distention of the colon with retained stool.   Abrupt termination   of the stool column in the sigmoid colon, likely an underlying malignancy. Nodularity in the adjacent mesenteric and retroperitoneal fat consistent with   carcinomatosis. 3. No other evidence of abdominal or pelvic metastatic disease. Evaluation   is limited by the lack of intravenous contrast.   4. Cholelithiasis with no acute features. 5. Colonic diverticulosis with no evidence of diverticulitis. 6. Large hiatal hernia. Mural thickening of the esophagus suggesting a   reflux esophagitis. XR CHEST PORTABLE   Final Result   Multiple pulmonary nodules are observed throughout the lungs, new since 2014. There are also interstitial opacities bilaterally. Recommend a follow-up CT   of the chest for further evaluation. An acute infectious or inflammatory   process may be considered, but malignancy cannot be excluded. Discharge Medications     Medication List      START taking these medications    potassium chloride 10 MEQ extended release tablet  Commonly known as: KLOR-CON M  Take 1 tablet by mouth daily        CONTINUE taking these medications    aspirin 81 MG tablet     blood glucose test strips strip  Commonly known as: ASCENSIA AUTODISC VI;ONE TOUCH ULTRA TEST VI  1 each by In Vitro route daily As needed.      glipiZIDE 5 MG extended release tablet  Commonly known as: GLUCOTROL XL  Take 1 tablet by mouth daily     hydroCHLOROthiazide 25 MG tablet  Commonly known as: HYDRODIURIL  TAKE 1 TABLET BY MOUTH DAILY     [employer] [place] 29G 29G X 12MM Misc  Generic drug: Insulin Pen Needle  1 each by Does not apply route daily     Lantus SoloStar 100 UNIT/ML injection pen  Generic drug: insulin glargine  INJECT 10 UNITS INTO THE SKIN NIGHTLY     levothyroxine 100 MCG tablet  Commonly known as: SYNTHROID  TAKE 1 TABLET BY MOUTH DAILY     omeprazole 40 MG delayed release capsule  Commonly known as: PRILOSEC  TAKE 1 CAPSULE BY MOUTH EVERY MORNING (BEFORE BREAKFAST)     vitamin D 25 MCG (1000 UT) Caps Where to Get Your Medications      These medications were sent to [facility] [phone] - F [phone]  [address] [place] [facility] [address]    Phone: [phone]   · potassium chloride 10 MEQ extended release tablet           Discharge Condition/Location: Stable    Follow Up:   Follow up with PCP, general surgery, and oncology         Vladimir Desir MD 1/6/2022 12:50 PM

## 2022-01-06 NOTE — PROGRESS NOTES
General Surgery - Mary Woodall, APRN - CNP, CNP  Daily Progress Note    Pt Name: Stephanie Pfeiffer Record Number: [id number]  Date of Birth 1940   Today's Date: 1/6/2022    ASSESSMENT  1. POD #6 s/p ex lap with diverting loop sigmoid colostomy and peritoneal biopsy  2. ABD: soft, no distention, staples look with good some ecchymosis, stoma:red, moist, + flatus, + stool, no N/V,   3. K+ 3.5  4. Mg 1.7  5. Ostomy : only 350, + stool and flatus in bag  6. VSS  7. Path: colon cancer with carcinomatosis   8. Pt states \"I am going home today, my sons nurse is going to see me too. \"     PLAN  1. Ostomy RN  2. PT/OT: ambulate pt  3. DVT proph: Lovenox  4. Oral pain meds  5. Soft diet with Ensure  6. Pt looks good POD #6: Pt can be discharged home from a surgical standpoint with home ostomy care. All discharge instructions were discussed with the patient at the bedside, all questions were answered and she verbalized understanding. Eloisa Lora has improved from yesterday. Pain is well controlled. She has no nausea and no vomiting. She has passed flatus and has had a bowel movement. She is tolerating a soft diet. Current activity is up with assistance    OBJECTIVE  VITALS:  height is 5' 7\" (1.702 m) and weight is 165 lb (74.8 kg). Her oral temperature is 98.6 °F (37 °C). Her blood pressure is 161/69 (abnormal) and her pulse is 83. Her respiration is 18 and oxygen saturation is 93%. VITALS:  BP (!) 161/69   Pulse 83   Temp 98.6 °F (37 °C) (Oral)   Resp 18   Ht 5' 7\" (1.702 m)   Wt 165 lb (74.8 kg)   SpO2 93%   BMI 25.84 kg/m²   INTAKE/OUTPUT:      Intake/Output Summary (Last 24 hours) at 1/6/2022 1143  Last data filed at 1/6/2022 0845  Gross per 24 hour   Intake 600 ml   Output 625 ml   Net -25 ml     GENERAL: alert, cooperative, no distress  I/O last 3 completed shifts:   In: 840 [P.O.:840]  Out: 950 [Urine:600; Stool:350]  I/O this shift:  In: 120 [P.O.:120]  Out: 100 [Urine:100]    LABS  Recent Labs     01/06/22 0522   WBC 6.1   HGB 9.9*   HCT 29.6*   *      K 3.5      CO2 29   BUN 6*   CREATININE 0.6   MG 1.70*   PHOS 2.7   CALCIUM 8.4   AST 11*   ALT <5*   BILITOT 0.3   BILIDIR <0.2     CBC with Differential:    Lab Results   Component Value Date    WBC 6.1 01/06/2022    RBC 3.28 01/06/2022    HGB 9.9 01/06/2022    HCT 29.6 01/06/2022     01/06/2022    MCV 90.2 01/06/2022    MCH 30.2 01/06/2022    MCHC 33.5 01/06/2022    RDW 14.3 01/06/2022    SEGSPCT 61.2 05/22/2013    LYMPHOPCT 21.0 01/06/2022    MONOPCT 11.1 01/06/2022    BASOPCT 0.3 01/06/2022    MONOSABS 0.7 01/06/2022    LYMPHSABS 1.3 01/06/2022    EOSABS 0.1 01/06/2022    BASOSABS 0.0 01/06/2022     CMP:    Lab Results   Component Value Date     01/06/2022    K 3.5 01/06/2022     01/06/2022    CO2 29 01/06/2022    BUN 6 01/06/2022    CREATININE 0.6 01/06/2022    GFRAA >60 01/06/2022    AGRATIO 0.9 12/30/2021    LABGLOM >60 01/06/2022    LABGLOM 59 02/20/2013    GLUCOSE 95 01/06/2022    PROT 5.4 01/06/2022    PROT 7.3 02/20/2013    LABALBU 2.3 01/06/2022    CALCIUM 8.4 01/06/2022    BILITOT 0.3 01/06/2022    ALKPHOS 58 01/06/2022    AST 11 01/06/2022    ALT <5 01/06/2022         Mary Gomez, APRN - CNP  Electronically signed 1/6/2022 at 11:43 AM

## 2022-01-06 NOTE — PROGRESS NOTES
ONCOLOGY FOLLOW-UP:         PROBLEM LIST:       Patient Active Problem List   Diagnosis Code    Type 2 diabetes mellitus with hyperglycemia ([facility] [address] E11.65    Essential hypertension I10    Hypothyroidism E03.9    Family history of early CAD Z80.55    Mixed hyperlipidemia E78.2    Precordial pain R07.2    Statin intolerance Z78.9    Thyrotoxicosis E05.90    Chronic pain of right knee M25.561, G89.29    Heart murmur R01.1    Vitamin D deficiency E55.9    Iron deficiency anemia D50.9    CKD (chronic kidney disease) stage 3, GFR 30-59 ml/min (HCC) N18.30    Abnormal weight loss R63.4    Gastroesophageal reflux disease with esophagitis K21.00    SBO (small bowel obstruction) ([facility]) K56.609    Hypokalemia E87.6    Colonic mass K63.89    Carcinomatosis (HCC) C80.0    Moderate protein-calorie malnutrition (HCC) E44.0       INTERVAL HISTORY:       Pt POD 6. Out of ICU and on [address]. Advancing diet. REVIEW OF SYSTEMS:       10 point ROS completed. Pertinent positives in HPI, otherwise negative.      PHYSICAL EXAM:       Vitals:    01/06/22 0407   BP: (!) 153/59   Pulse: 77   Resp: 16   Temp: 97.6 °F (36.4 °C)   SpO2: 94%       General appearance: alert and cooperative  Head: Normocephalic, without obvious abnormality, atraumatic  Neck: No palpable lymphadenopathy in supraclavicular or cervical chains  Lungs: Clear to auscultation bilaterally, no audible rales, wheezes or crackles  Heart: Regular rate and rhythm, S1, S2 normal  Abdomen: Soft, non-tender; bowel sounds normal; no masses,  no organomegaly  Extremities: without cyanosis, clubbing, edema or asymmetry  Skin: No jaundice, purpura or petechiae      LABS:     Lab Results   Component Value Date    WBC 6.1 01/06/2022    HGB 9.9 (L) 01/06/2022    HCT 29.6 (L) 01/06/2022    MCV 90.2 01/06/2022     (H) 01/06/2022       Lab Results   Component Value Date    GLUCOSE 95 01/06/2022    BUN 6 (L) 01/06/2022    CREATININE 0.6 01/06/2022    K 3.5 01/06/2022    BCR 19 02/20/2013    PHOS 2.7 01/06/2022       Lab Results   Component Value Date    ALKPHOS 58 01/06/2022    ALT <5 (L) 01/06/2022    AST 11 (L) 01/06/2022    BILITOT 0.3 01/06/2022    BILIDIR <0.2 01/06/2022    PROT 5.4 (L) 01/06/2022     CEA 30.9    Lab Results   Component Value Date    IRON 33 (L) 12/31/2021    TIBC 192 (L) 12/31/2021    FERRITIN 70.8 12/30/2021         IMAGING:     XR CHEST PORTABLE  Result Date: 12/30/2021  Multiple pulmonary nodules are observed throughout the lungs, new since 2014. There are also interstitial opacities bilaterally. Recommend a follow-up CT of the chest for further evaluation. An acute infectious or inflammatory process may be considered, but malignancy cannot be excluded. CT CHEST ABDOMEN PELVIS WO CONTRAST  Result Date: 12/31/2021  1. Multiple bilateral pulmonary nodules most consistent with metastatic disease. No acute pulmonary infiltrate. 2. Moderate distention of the colon with retained stool. Abrupt termination of the stool column in the sigmoid colon, likely an underlying malignancy. Nodularity in the adjacent mesenteric and retroperitoneal fat consistent with carcinomatosis. 3. No other evidence of abdominal or pelvic metastatic disease. Evaluation is limited by the lack of intravenous contrast. 4. Cholelithiasis with no acute features. 5. Colonic diverticulosis with no evidence of diverticulitis. 6. Large hiatal hernia. Mural thickening of the esophagus suggesting a reflux esophagitis. PATHOLOGY:       Surgical Pathology 12/31/21 -     FINAL DIAGNOSIS:     Peritoneal implants, biopsies:   - Poorly differentiated adenocarcinoma.      COMMENT:   Case discussed with Dr. Ambrose Musa on 1/4/2022.  Portions of the   tumor show mucinous differentiation with signet ring cells.  The   immunohistochemical staining profile of the tumor cells supports a   colorectal primary tumor, but clinical/radiologic correlation is   recommended.        CHAVEZCA/CHAVEZCA STAGING:     Cancer Staging  IV    ASSESSMENT:     Problem List Items Addressed This Visit     SBO (small bowel obstruction) (HCC) - Primary    Hypokalemia    * (Principal) Carcinomatosis (HCC)    Relevant Medications    acetaminophen (TYLENOL) tablet 650 mg    acetaminophen (TYLENOL) suppository 650 mg    oxyCODONE (ROXICODONE) immediate release tablet 5 mg    oxyCODONE (ROXICODONE) immediate release tablet 10 mg    HYDROmorphone (DILAUDID) injection 0.5 mg                PLAN:     1. Sigmoid colon mass - poorly differentiated adenocarcinoma  2. Peritoneal carcinomatosis  3. Lung metastasis    - clinical picture suggests metastatic colon cancer  - POD 6 s/p emergent diverting colostomy and peritoneal biopsy for obstruction  - await pathology  - CEA elevated at 30.9  - will need outpatient PET scan and Caris testing  - will need time to recover from surgery prior to any consideration of systemic therapy  - we did discuss treatment vs. Hospice - she seems certain she would like to try treatment and understands this is advanced disease  - can f/u after d/c - will arrange PET    4.  Anemia    - suspected from colon cancer  - checking iron studies    Ronnie Joseph MD

## 2022-01-06 NOTE — DISCHARGE INSTR - DIET

## 2022-01-06 NOTE — PROGRESS NOTES
without Stair Climbing Raw Score : 20    Treatment Time:  9:08-9:34  Treatment number: 2  Timed Code Treatment Minutes: 26 minutes  Total Treatment Minutes:  26  minutes    Cognition    A&O x4   Able to follow 2 step commands    Subjective  Patient lying supine in bed with no family present   Pt agreeable to this PT tx. Pt reporting that she feels much better today. Pain   No  Location:   Rating:    NA/10  Pain Medicine Status: Denies need     Bed Mobility   Supine to Sit:    Modified Independent  Sit to Supine:   Modified Independent  Rolling:   Supervision  Scooting:   Modified Independent    Transfer Training     Sit to stand:   Supervision  Stand to sit:   Supervision  Bed to Chair:   Not Tested with use of N/A    Gait Training gait completed as indicated below  Distance:      375 ft  Deviations (firm surface/linoleum):  decreased aundrea, step through pattern and decreased step length bilaterally  Assistive Device Used:    rolling walker (RW)  Level of Assist:    Supervision  Comment: steady, no LOB    Stair Training deferred, pt does not have stairs in the home environment    Therapeutic Exercise all completed bilaterally unless indicated  LAQ x 15 reps   Standing heel raises x 15 reps  Standing mini squats x 15 reps  Standing hip abd x 15 reps  Standing marching x 15 reps    Balance  Sitting:  Normal; Independent  Comments:     Standing: Good ; Supervision  Comments: with RW    Patient Education      Role of PT, POC, Discharge recommendations, DC recommendations, safety awareness, transfer techniques, pacing activity and calling for assist with mobility. Positioning Needs       Pt in bed, alarm set, positioned in proper neutral alignment and pressure relief provided. Call light provided and all needs within reach     Activity Tolerance   Pt completed therapy session with No adverse symptoms noted w/activity. Other    Assessment :  Patient with good tolerance for tx.  Noted improved mobility with all functional tasks, steady and safe with no LOB. Improved endurance and able to tolerate longer distance gait. Recommending Home PRN assist and with home PT upon discharge as patient functioning close to baseline level and would benefit from continued therapy services    Goals (all goals ongoing unless otherwise indicated)  To be met in 3 visits:  1). Independent with LE Ex x 10 reps  2.) Bed to chair: Independent     To be met in 6 visits:  1). Supine to/from sit: Independent -MET  2). Sit to/from stand: Independent -Progressing  3). Bed to chair: Independent -Progressing  4). Gait: Ambulate 150 ft.  with  SBA and use of LRAD (least restrictive assistive device) -MET, progress to mod I with RW  5). Tolerate B LE exercises 3 sets of 10-15 reps -MET    Plan   Continue with plan of care. Signature: Roger Chen, PT, DPT, OMT-C  #[id number]      If patient discharges from this facility prior to next visit, this note will serve as the Discharge Summary.

## 2022-01-06 NOTE — FLOWSHEET NOTE
01/06/22 0845   Vitals   Temp 98.6 °F (37 °C)   Temp Source Oral   Pulse 83   Heart Rate Source Monitor   Resp 18   BP (!) 161/69   BP Location Left upper arm   BP Upper/Lower Upper   BP Method Automatic   Patient Position Semi fowlers   Level of Consciousness Alert (0)   MEWS Score 1   Patient Currently in Pain Denies   Oxygen Therapy   SpO2 93 %   O2 Device None (Room air)   Shift assessment complete. Patients head-toe complete, pt BP elevated reports takes hydrochlorothiazide at home . notified MD The bed is locked and is in the lowest position. Call light and bedside table are within reach.

## 2022-01-06 NOTE — CONSULTS
Pt seen for ostomy care and teaching. Pt performed pouch change with some assistance yesterday. She verbalizes comfort with pouch changes. Pt has plenty of supplies for home. Typed instructions provided, samples ordered from [facility] per her request.  Mother asked for letter to be delivered to her son. She will be going home today with [facility] later this evening. [facility]OCN contact provided, encouraged to call for questions or issues. Current appliance intact from yesterday.

## 2022-01-07 ENCOUNTER — CARE COORDINATION (OUTPATIENT)
Dept: CASE MANAGEMENT | Age: 82
End: 2022-01-07

## 2022-01-07 ENCOUNTER — TELEPHONE (OUTPATIENT)
Dept: FAMILY MEDICINE CLINIC | Age: 82
End: 2022-01-07

## 2022-01-07 DIAGNOSIS — N18.31 STAGE 3A CHRONIC KIDNEY DISEASE (HCC): Primary | ICD-10-CM

## 2022-01-07 PROCEDURE — 1111F DSCHRG MED/CURRENT MED MERGE: CPT | Performed by: NURSE PRACTITIONER

## 2022-01-07 NOTE — CARE COORDINATION
[facility] Transitions Initial Follow Up Call    Call within 2 business days of discharge: Yes    Patient: Carol Colin Patient : 1940   MRN: [id number]  Reason for Admission:  abd pain, n/v, obstructing distal colon mass s/p exp lap with diverting loop sigmoid colostomy and peritoneal bx on , peritoneal carcinomatosis with mets to lung, hypokalemia, hypomagnesemia, GERD, large hiatal hernia, DM2, hx iron deficiency anemia, HTN, hypothyroidism, aortic valve sclerosis without stenosis, prolonged QTc, moderate PCM, weakness and debility (AM-PAC 16). Discharge Date: 22 RARS: Readmission Risk Score: 15.3 ( )      Last Discharge [facility]       Complaint Diagnosis Description Type Department Provider    21 Abdominal Pain SBO (small bowel obstruction) ([facility] [address] . .. ED to Hosp-Admission (Discharged) (ADMITTED) [facility] 2 Boone Arevalo MD; An. .. Spoke with: Valerie Ayers (patient)    Facility: [facility]    Non-face-to-face services provided:  Obtained and reviewed discharge summary and/or continuity of care documents  Education of patient/family/caregiver/guardian to support self-management-s/s monitor (Page 13 AVS surgeon's instructions reviewed)  Assessment and support for treatment adherence and medication management-[id number] completed    Was this an external facility discharge? No Discharge Facility: NA    Challenges to be reviewed by the provider   Additional needs identified to be addressed with provider No       Method of communication with provider : none    Advance Care Planning:   Does patient have an Advance Directive:  not on file. Was this a readmission?  No  Patient stated reason for admission: abd pain  Patients top risk factors for readmission:  functional physical ability  lack of knowledge about disease  medical condition-cancer  stages of grief    Care Transition Nurse (CTN) contacted the patient by telephone to perform post hospital discharge assessment. Verified name and  with patient as identifiers. Provided introduction to self, and explanation of the CTN role. CTN reviewed discharge instructions, medical action plan and red flags with patient who verbalized understanding. Patient given an opportunity to ask questions and does not have any further questions or concerns at this time. Were discharge instructions available to patient? Yes. Reviewed appropriate site of care based on symptoms and resources available to patient including: PCP, Specialist and Home health. The patient agrees to contact the PCP office for questions related to their healthcare. Medication reconciliation was performed with patient, who verbalizes understanding of administration of home medications. COVID Risk Education    COVID-19 and Influenza A&B Not Detected on 2021. Patient completed Pfizer 2-step COVID vaccination and dose 3 is overdue per chart review. Patient was given an opportunity to verbalize any questions and concerns and agrees to contact CTN or health care provider for questions related to their healthcare. She feels well. Has been up cleaning and completed 4 loads of laundry. States [facility] nurse completed [facility] visit this morning. Taking meds as prescribed. States surgery and oncology to call for appointment. Denies needs/concerns. States the RN seeing her is well known as she is also her son's nurse and she lives nearby. States she has a good support system. Reviewed AVS instructions. CTN provided contact information for future needs. Plan for follow-up call in 7-14 days based on severity of symptoms and risk factors.       Care Transitions 24 Hour Call    Schedule Follow Up Appointment with PCP: Completed  Do you have any ongoing symptoms?: No  Do you have a copy of your discharge instructions?: Yes  Do you have all of your prescriptions and are they filled?: Yes  Have you been contacted by a [facility] Pharmacist?: No  Have you scheduled your follow up appointment?: Yes  Were you discharged with any Home Care or Post Acute Services: Yes  Post Acute Services: Home Health (Comment: [facility])  Do you feel like you have everything you need to keep you well at home?: Yes  Care Transitions Interventions  No Identified Needs         Follow Up  No future appointments.     Filipe Grant RN

## 2022-01-08 LAB — SOLUBLE TRANSFERRIN RECEPT: 3.2 MG/L (ref 1.9–4.4)

## 2022-01-12 ENCOUNTER — VIRTUAL VISIT (OUTPATIENT)
Dept: FAMILY MEDICINE CLINIC | Age: 82
End: 2022-01-12
Payer: MEDICARE

## 2022-01-12 DIAGNOSIS — C80.0 CARCINOMATOSIS (HCC): ICD-10-CM

## 2022-01-12 DIAGNOSIS — N18.31 STAGE 3A CHRONIC KIDNEY DISEASE (HCC): ICD-10-CM

## 2022-01-12 DIAGNOSIS — E11.65 TYPE 2 DIABETES MELLITUS WITH HYPERGLYCEMIA, WITHOUT LONG-TERM CURRENT USE OF INSULIN (HCC): ICD-10-CM

## 2022-01-12 DIAGNOSIS — Z09 HOSPITAL DISCHARGE FOLLOW-UP: Primary | ICD-10-CM

## 2022-01-12 DIAGNOSIS — E44.0 MODERATE PROTEIN-CALORIE MALNUTRITION (HCC): ICD-10-CM

## 2022-01-12 PROCEDURE — 1111F DSCHRG MED/CURRENT MED MERGE: CPT | Performed by: NURSE PRACTITIONER

## 2022-01-12 PROCEDURE — 99495 TRANSJ CARE MGMT MOD F2F 14D: CPT | Performed by: NURSE PRACTITIONER

## 2022-01-12 RX ORDER — LACTOSE-REDUCED FOOD
LIQUID (ML) ORAL
Qty: 30 EACH | Refills: 0 | Status: SHIPPED | OUTPATIENT
Start: 2022-01-12 | End: 2022-06-30 | Stop reason: CLARIF

## 2022-01-12 RX ORDER — LEVOTHYROXINE SODIUM 0.1 MG/1
TABLET ORAL
COMMUNITY
Start: 2022-01-07 | End: 2022-01-12 | Stop reason: SDUPTHER

## 2022-01-12 ASSESSMENT — ENCOUNTER SYMPTOMS
COUGH: 0
BACK PAIN: 0
VOMITING: 0
SINUS PRESSURE: 0
EYE PAIN: 0
CHEST TIGHTNESS: 0
NAUSEA: 0
RHINORRHEA: 0
TROUBLE SWALLOWING: 0
ALLERGIC/IMMUNOLOGIC NEGATIVE: 1
PHOTOPHOBIA: 0
COLOR CHANGE: 0
CONSTIPATION: 0
SHORTNESS OF BREATH: 0
ABDOMINAL PAIN: 0
GASTROINTESTINAL NEGATIVE: 1
DIARRHEA: 0
EYE ITCHING: 0
APNEA: 0
STRIDOR: 0
EYE REDNESS: 0
RESPIRATORY NEGATIVE: 1
CHOKING: 0
EYE DISCHARGE: 0
BLOOD IN STOOL: 0
SORE THROAT: 0
WHEEZING: 0

## 2022-01-12 NOTE — PROGRESS NOTES
Post-Discharge Transitional Care Management Services or Hospital Follow Up      Sunita Lin   YOB: 1940    Date of Office Visit:  1/12/2022  Date of Hospital Admission: 12/30/21  Date of Hospital Discharge: 1/6/22  Readmission Risk Score(high >=14%. Medium >=10%):Readmission Risk Score: 15.3 ( )      Care management risk score Rising risk (score 2-5) and Complex Care (Scores >=6): 2     Non face to face  following discharge, date last encounter closed (first attempt may have been earlier): 1/7/2022  4:04 PM 1/7/2022  4:04 PM    Call initiated 2 business days of discharge: Yes     Patient Active Problem List   Diagnosis    Type 2 diabetes mellitus with hyperglycemia ([facility] [address]    Essential hypertension    Hypothyroidism    Family history of early CAD    Mixed hyperlipidemia    Precordial pain    Statin intolerance    Thyrotoxicosis    Chronic pain of right knee    Heart murmur    Vitamin D deficiency    Iron deficiency anemia    CKD (chronic kidney disease) stage 3, GFR 30-59 ml/min (HCC)    Abnormal weight loss    Gastroesophageal reflux disease with esophagitis    SBO (small bowel obstruction) (HCC)    Hypokalemia    Colonic mass    Carcinomatosis (HCC)    Moderate protein-calorie malnutrition (HCC)       Allergies   Allergen Reactions    Food Color Pink Anaphylaxis    Shellfish Allergy Anaphylaxis     Heart scan    Atorvastatin      Other reaction(s): Weakness  Leg weakness    Ramipril      Other reaction(s): Cough    Metformin Nausea And Vomiting       Medications listed as ordered at the time of discharge from hospital     Medication List          Accurate as of January 12, 2022 12:46 PM. If you have any questions, ask your nurse or doctor.             START taking these medications    Ensure Clear Liqd  Drink one clear ensure daily        CONTINUE taking these medications    aspirin 81 MG tablet     blood glucose test strips strip  Commonly known as: ASCENSIA AUTODISC VI;ONE TOUCH ULTRA TEST VI  1 each by In Vitro route daily As needed.      glipiZIDE 5 MG extended release tablet  Commonly known as: GLUCOTROL XL  Take 1 tablet by mouth daily     hydroCHLOROthiazide 25 MG tablet  Commonly known as: HYDRODIURIL  TAKE 1 TABLET BY MOUTH DAILY     [employer] Pen Skipwith 29G 29G X 12MM Misc  Generic drug: Insulin Pen Needle  1 each by Does not apply route daily     Lantus SoloStar 100 UNIT/ML injection pen  Generic drug: insulin glargine  INJECT 10 UNITS INTO THE SKIN NIGHTLY     levothyroxine 100 MCG tablet  Commonly known as: SYNTHROID  TAKE 1 TABLET BY MOUTH DAILY     omeprazole 40 MG delayed release capsule  Commonly known as: PRILOSEC  TAKE 1 CAPSULE BY MOUTH EVERY MORNING (BEFORE BREAKFAST)     potassium chloride 10 MEQ extended release tablet  Commonly known as: KLOR-CON M  Take 1 tablet by mouth daily     vitamin D 25 MCG (1000 UT) Caps           Where to Get Your Medications      You can get these medications from any pharmacy    Bring a paper prescription for each of these medications  · Ensure Clear Liqd           Medications marked \"taking\" at this time  Outpatient Medications Marked as Taking for the 1/12/22 encounter (Virtual Visit) with PILY Ngo CNP   Medication Sig Dispense Refill    Nutritional Supplements (ENSURE CLEAR) LIQD Drink one clear ensure daily 30 each 0    potassium chloride (KLOR-CON M) 10 MEQ extended release tablet Take 1 tablet by mouth daily 30 tablet 0    glipiZIDE (GLUCOTROL XL) 5 MG extended release tablet Take 1 tablet by mouth daily 30 tablet 1    LANTUS SOLOSTAR 100 UNIT/ML injection pen INJECT 10 UNITS INTO THE SKIN NIGHTLY 15 mL 0    omeprazole (PRILOSEC) 40 MG delayed release capsule TAKE 1 CAPSULE BY MOUTH EVERY MORNING (BEFORE BREAKFAST) 90 capsule 0    hydroCHLOROthiazide (HYDRODIURIL) 25 MG tablet TAKE 1 TABLET BY MOUTH DAILY 90 tablet 1    levothyroxine (SYNTHROID) 100 MCG tablet TAKE 1 TABLET BY MOUTH DAILY 90 tablet 1    Insulin Pen Needle ([employer] PEN NEEDLES 29G) 29G X 12MM MISC 1 each by Does not apply route daily 100 each 3    blood glucose test strips (ASCENSIA AUTODISC VI;ONE TOUCH ULTRA TEST VI) strip 1 each by In Vitro route daily As needed. 100 each 11    vitamin D 25 MCG (1000 UT) CAPS Take by mouth      aspirin 81 MG tablet Take 81 mg by mouth           Medications patient taking as of now reconciled against medications ordered at time of hospital discharge: Yes    Chief Complaint   Patient presents with    Follow-Up from Hospital     pt went to ER for weakness. pt had had issues with her diarrhea being chronic and felt weak. pt was dx with colon cancer. pt is doing ok since being discharged. HPI    Inpatient course: Discharge summary reviewed- see chart. Interval history/Current status: Ronaldo Be presents for a virtual visit today to follow up on her hospital discharge. She went to [facility] via EMS for abdominal tenderness, diarrhea, and mass in her lower left abdomen. She was having tenderness in the upper abdomen. She had a cardiac workup that revealed hypokalemia in the ED and chest x-ray revealed pulmonary nodules. She had RASHAWN with GFR of 39. CT revealed a mass in the colon with peritoneal carcinomatosis and possible mets throughout the lungs. She was sent to surgery for obstruction distal colon mass with possible metastatic disease. She had exploratory laparotomy with diverting loop sigmoid colostomy and peritoneal biopsy on 12/31/2022. Oncology was consulted. Today, Ronaldo Be admits she is doing well. She has been trying to eat small meals throughout the day. She is trying to increase her protein intake as she does have moderate protein-calorie malnutrition. She states she is not having anymore abdominal pain. She is scheduled for a scan tomorrow with [facility] and then she is going to be seeing Dr. Mani Atkins afterwards to discuss further treatment plan of care.  She is getting home health and physical therapy and feels like her energy is improving. She is slowly improving and is able to cook meals and do light loads of laundry. [facility] denies any episodes of hypoglycemia with her DM. She has been checking her glucose and her glucose is running about 150. She is taking her Glipizide as recommended. Review of Systems   Constitutional: Positive for fatigue. Negative for activity change, appetite change, chills, diaphoresis, fever and unexpected weight change. HENT: Negative. Negative for ear pain, rhinorrhea, sinus pressure, sneezing, sore throat and trouble swallowing. Eyes: Negative for photophobia, pain, discharge, redness, itching and visual disturbance. Respiratory: Negative. Negative for apnea, cough, choking, chest tightness, shortness of breath, wheezing and stridor. Cardiovascular: Negative for chest pain, palpitations and leg swelling. Gastrointestinal: Negative. Negative for abdominal pain, blood in stool, constipation, diarrhea, nausea and vomiting. Genitourinary: Negative. Negative for decreased urine volume, difficulty urinating, dysuria, enuresis, flank pain, frequency, genital sores, hematuria and urgency. Musculoskeletal: Negative. Negative for arthralgias, back pain, gait problem, joint swelling, myalgias, neck pain and neck stiffness. Skin: Negative. Negative for color change, pallor, rash and wound. Allergic/Immunologic: Negative. Neurological: Negative. Negative for dizziness, facial asymmetry, weakness, light-headedness and headaches. Psychiatric/Behavioral: Negative for agitation, behavioral problems, confusion, decreased concentration, dysphoric mood, hallucinations, self-injury, sleep disturbance and suicidal ideas. The patient is not nervous/anxious and is not hyperactive. There were no vitals filed for this visit. There is no height or weight on file to calculate BMI.    Wt Readings from Last 3 Encounters:   12/30/21 165 lb (74.8 kg)   07/20/21 170 lb (77.1 kg)   09/16/19 199 lb (90.3 kg)     BP Readings from Last 3 Encounters:   01/06/22 134/68   12/31/21 (!) 160/91   07/20/21 122/80       Physical Exam    PHYSICAL EXAMINATION:  [ INSTRUCTIONS:  \"[x]\" Indicates a positive item  \"[]\" Indicates a negative item      Vital Signs: (As obtained by patient/caregiver or practitioner observation)    Blood pressure-  Heart rate-    Respiratory rate-    Temperature-  Pulse oximetry-     Constitutional: [x] Appears well-developed and well-nourished [x] No apparent distress      [] Abnormal-   Mental status  [x] Alert and awake  [x] Oriented to person/place/time [x]Able to follow commands      Eyes:  EOM    [x]  Normal  [] Abnormal-  Sclera  [x]  Normal  [] Abnormal -         Discharge [x]  None visible  [] Abnormal -    HENT:   [x] Normocephalic, atraumatic. [] Abnormal   [x] Mouth/Throat: Mucous membranes are moist.     External Ears [x] Normal  [] Abnormal-     Neck: [x] No visualized mass     Pulmonary/Chest: [x] Respiratory effort normal.  [x] No visualized signs of difficulty breathing or respiratory distress        [] Abnormal-      Musculoskeletal:   [x] Normal gait with no signs of ataxia         [x] Normal range of motion of neck        [] Abnormal-       Neurological:        [x] No Facial Asymmetry (Cranial nerve 7 motor function) (limited exam to video visit)          [x] No gaze palsy        [] Abnormal-         Skin:        [x] No significant exanthematous lesions or discoloration noted on facial skin         [] Abnormal-            Psychiatric:       [x] Normal Affect [x] No Hallucinations        [] Abnormal-     Assessment/Plan:    Patient is doing well since she was discharged from the hospital.  She is seeing oncology as recommended. Glucose WNL. She will be discussing her treatment plan of care further with oncology. Recommend her drinking at least one Ensure a day to assist with her moderate protein-calorie malnutrition.   She is urinating well and denies having any decreased urine production. 1. Hospital discharge follow-up  - [place] DISCHARGE MEDS RECONCILED W/ CURRENT OUTPATIENT MED LIST    2. Carcinomatosis ([address]    3. Moderate protein-calorie malnutrition (HCC)  - Nutritional Supplements (ENSURE CLEAR) LIQD; Drink one clear ensure daily  Dispense: 30 each; Refill: 0    4.  Type 2 diabetes mellitus with hyperglycemia, without long-term current use of insulin (HCC)    5. Stage 3a chronic kidney disease [facility])        Medical Decision Making: moderate complexity

## 2022-01-12 NOTE — PROGRESS NOTES
Melissa Ortiz is a [de-identified] y.o. female evaluated via telephone on 1/12/2022. Consent:  She and/or health care decision maker is aware that that she may receive a bill for this telephone service, depending on her insurance coverage, and has provided verbal consent to proceed: Yes      Documentation:  I communicated with the patient and/or health care decision maker about hospital follow up. Details of this discussion including any medical advice provided: n/a      I affirm this is a Patient Initiated Episode with a Patient who has not had a related appointment within my department in the past 7 days or scheduled within the next 24 hours. Patient identification was verified at the start of the visit: Yes    Total Time: minutes: 5-10 minutes    The visit was conducted pursuant to the emergency declaration under the [address] [place] [address], [address] [place] [address] authority and the [place] [employer] and [place] General Act. Patient identification was verified, and a caregiver was present when appropriate. The patient was located in a state where the provider was credentialed to provide care.     Note: not billable if this call serves to triage the patient into an appointment for the relevant concern      Alex Lopez MA

## 2022-01-13 ENCOUNTER — HOSPITAL ENCOUNTER (OUTPATIENT)
Dept: PET IMAGING | Age: 82
Discharge: HOME | End: 2022-01-13
Payer: MEDICARE

## 2022-01-13 DIAGNOSIS — C18.7 ADENOCARCINOMA OF SIGMOID COLON (HCC): ICD-10-CM

## 2022-01-13 PROCEDURE — A9552 F18 FDG: HCPCS | Performed by: INTERNAL MEDICINE

## 2022-01-13 PROCEDURE — 78815 PET IMAGE W/CT SKULL-THIGH: CPT

## 2022-01-13 PROCEDURE — 3430000000 HC RX DIAGNOSTIC RADIOPHARMACEUTICAL: Performed by: INTERNAL MEDICINE

## 2022-01-13 RX ORDER — FLUDEOXYGLUCOSE F 18 200 MCI/ML
12.84 INJECTION, SOLUTION INTRAVENOUS
Status: COMPLETED | OUTPATIENT
Start: 2022-01-13 | End: 2022-01-13

## 2022-01-13 RX ADMIN — FLUDEOXYGLUCOSE F 18 12.84 MILLICURIE: 200 INJECTION, SOLUTION INTRAVENOUS at 12:47

## 2022-01-14 ENCOUNTER — CARE COORDINATION (OUTPATIENT)
Dept: CASE MANAGEMENT | Age: 82
End: 2022-01-14

## 2022-01-14 NOTE — CARE COORDINATION
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 -> home with [facility].  Discharge Date: 22 RARS: Readmission Risk Score: 15.3 ( )    Unable to reach patient by phone at this time. Message left including CTN contact info for return call. She returned call while driving to her son's home. Reports she is doing very well and denies needs going into the weekend. She has CTN contact info for future needs.      Follow Up  Future Appointments   Date Time Provider Carlyn Kendrick   2/15/2022  1:00 PM PILY Larios - LUCA Trevino RN

## 2022-01-18 ENCOUNTER — TELEPHONE (OUTPATIENT)
Dept: SURGERY | Age: 82
End: 2022-01-18

## 2022-01-18 ENCOUNTER — TELEPHONE (OUTPATIENT)
Dept: FAMILY MEDICINE CLINIC | Age: 82
End: 2022-01-18

## 2022-01-18 NOTE — TELEPHONE ENCOUNTER
Olya Borges from [employer] office from [address] [place] [address] called saying [facility] home care wants to know if they can get an order to remove stables, from abdominal incisions. They said the pt says she did not need to follow up per surgeon.   [phone]

## 2022-01-18 NOTE — TELEPHONE ENCOUNTER
Please call surgeon's office and verify that patient was not to follow up with surgeon and if surgeon is okay with home health care removing staples. If surgeon okay with home health care removing stables, may place order for home health care to remove.

## 2022-01-19 NOTE — TELEPHONE ENCOUNTER
Maggie Hartman RN from [facility] would like a call back regarding this.   [phone]

## 2022-01-19 NOTE — TELEPHONE ENCOUNTER
I called and spoke with Karson Leija, advised we would like to see her back she still has staples- if she cant make it here then to call us back to let us know, pt is currently taking care of her son, and has a lot going on.

## 2022-01-20 ENCOUNTER — CARE COORDINATION (OUTPATIENT)
Dept: CASE MANAGEMENT | Age: 82
End: 2022-01-20

## 2022-01-20 ENCOUNTER — TELEPHONE (OUTPATIENT)
Dept: SURGERY | Age: 82
End: 2022-01-20

## 2022-01-20 NOTE — TELEPHONE ENCOUNTER
Fine for home care to remove her staples. I don't necessarily need to see her back, but she does need to follow-up with oncology.  Dr. Jayce Ramos saw her in the hospital

## 2022-01-20 NOTE — TELEPHONE ENCOUNTER
[facility] Waldron office called regarding patient and her staple removal and asked for me to call patient to schedule appointment. I called patient to scheduled an appointment for removal of her staples. She is unable to come into office due to complications with her son and says she was under the impression her home health care nurse would be able to do this. Pt would like a callback to know what she is to do.

## 2022-01-20 NOTE — CARE COORDINATION
[facility] Transitions Follow Up Call    2022    Patient: Rafael Colin  Patient : 1940   MRN: [id number]  Reason for Admission: SBO  Discharge Date: 22 RARS: Readmission Risk Score: 15.3 ( )         Spoke with: [id number] [address] Transitions Follow Up Call    Needs to be reviewed by the provider   Additional needs identified to be addressed with provider: No  home health care-[facility]. for SN, PT and PT             Method of communication with provider : none      Care Transition Nurse (CTN) contacted the patient by telephone to follow up after admission on 21. Verified name and  with patient as identifiers. Addressed changes since last contact: Pt to have staples removed  Discussed follow-up appointments. If no appointment was previously scheduled, appointment scheduling offered: Yes. Is follow up appointment scheduled within 7 days of discharge? Yes. Advance Care Planning:   Does patient have an Advance Directive: [facility]  CTN reviewed discharge instructions, medical action plan and red flags with patient and discussed any barriers to care and/or understanding of plan of care after discharge. Discussed appropriate site of care based on symptoms and resources available to patient including: PCP, Specialist, Urgent care clinics, Home health and When to call 911. The patient agrees to contact the PCP office for questions related to their healthcare. Patients top risk factors for readmission: ineffective coping  caregiver stress  Interventions to address risk factors: Obtained and reviewed discharge summary and/or continuity of care documents      Non-[facility] follow up appointment(s):     CTN provided contact information for future needs. Plan for follow-up call in 5-7 days based on severity of symptoms and risk factors. Plan for next call: Staples removed?             Care Transitions Subsequent and Final Call    Schedule Follow Up Appointment with PCP: Completed  Subsequent and Final Calls  Do you have any ongoing symptoms?: No  Have your medications changed?: No  Do you have any questions related to your medications?: No  Do you currently have any active services?: Yes  Are you currently active with any services?: Home Health  Do you have any needs or concerns that I can assist you with?: Yes  Patient-reported Needs or Concerns: Pt unable to leave son alone at home so that she can have staples removed as he cannot be left unattended. Note in to PCP. Identified Barriers: Lack of Support  Care Transitions Interventions   Home Care Waiver: Completed Physical Therapy: Completed     Occupational Therapy: Completed     Other Interventions:            Follow Up  Future Appointments   Date Time Provider Carlyn Kendrick   2/15/2022  1:00 PM PILY Stallworth - [id number] LPN

## 2022-01-20 NOTE — TELEPHONE ENCOUNTER
Cesar Weir called stating that Dr. Juanis Cotton called and there's no f/u needed. States they(home health) will be removing the staples tomorrow.

## 2022-01-20 NOTE — TELEPHONE ENCOUNTER
Spoke with patient and she states that she can not go to Dr. Elizabeth Rivas office for staple removal due to her son being on a vent and is unable to leave him.   Left message for her home health nurse Robert Husbands ([phone]) to call office to see if that have been in touch with Dr. Sonia Del Rio office to see if we can proceed with an order for the home health to remove the staples

## 2022-01-20 NOTE — TELEPHONE ENCOUNTER
I have a message already out to [address] [place] [address]- awaiting a response- see duplicate message.  BC> Inform Melissa SNEED.

## 2022-01-26 ENCOUNTER — TELEPHONE (OUTPATIENT)
Dept: SURGERY | Age: 82
End: 2022-01-26

## 2022-01-26 DIAGNOSIS — Z01.818 PRE-OP TESTING: Primary | ICD-10-CM

## 2022-01-26 NOTE — TELEPHONE ENCOUNTER
I called and spoke with pt, she was advised that she is starting treatment on 2/2/22, needed to be scheduled for a port insertion and due to her schedule and transportation we set up for 01/31/22, 6:30 am arrival time, she will find someone to drive her, advised of covid testing, NPO after midnight, daily meds with a sip of water, pt advised she has not taken her Aspirin in a year, and is not taking any other blood thinners. I reviewed patients chart, medications and allergies. Pt agrees.

## 2022-01-27 ENCOUNTER — ANESTHESIA EVENT (OUTPATIENT)
Dept: OPERATING ROOM | Age: 82
End: 2022-01-27
Payer: MEDICARE

## 2022-01-27 ENCOUNTER — HOSPITAL ENCOUNTER (OUTPATIENT)
Age: 82
Discharge: HOME | End: 2022-01-27
Payer: MEDICARE

## 2022-01-27 ENCOUNTER — CARE COORDINATION (OUTPATIENT)
Dept: CASE MANAGEMENT | Age: 82
End: 2022-01-27

## 2022-01-27 DIAGNOSIS — Z01.818 PRE-OP TESTING: ICD-10-CM

## 2022-01-27 LAB — SARS-COV-2: NOT DETECTED

## 2022-01-27 PROCEDURE — U0005 INFEC AGEN DETEC AMPLI PROBE: HCPCS

## 2022-01-27 PROCEDURE — U0003 INFECTIOUS AGENT DETECTION BY NUCLEIC ACID (DNA OR RNA); SEVERE ACUTE RESPIRATORY SYNDROME CORONAVIRUS 2 (SARS-COV-2) (CORONAVIRUS DISEASE [COVID-19]), AMPLIFIED PROBE TECHNIQUE, MAKING USE OF HIGH THROUGHPUT TECHNOLOGIES AS DESCRIBED BY CMS-2020-01-R: HCPCS

## 2022-01-28 NOTE — PROGRESS NOTES
Preoperative Screening for Elective Surgery/Invasive Procedures While COVID-19 present in the community     Have you had any of the following symptoms?no  o Fever, chills  o Cough  o Shortness of breath  o Muscle aches/pain  o Diarrhea  o Abdominal pain, nausea, vomiting  o Loss or decrease in taste and / or smell   Risk of Exposure  o Have you recently been hospitalized for COVID-19 or flu-like illness, if so when?no  o Recently diagnosed with COVID-19, if so when?no  o Recently tested for COVID-19, if so when?yes 1/27/22 for surgery  o Have you been in close contact with a person or family member who currently has or recently had COVID-19? If yes, when and in what context?no  o Do you live with anybody who in the last 14 days has had fever, chills, shortness of breath, muscle aches, flu-like illness?no  o Do you have any close contacts or family members who are currently in the hospital for COVID-19 or flu-like illness? If yes, assess recent close contact with this person. no    Indicate if the patient has a positive screen by answering yes to one or more of the above questions. Patients who test positive or screen positive prior to surgery or on the day of surgery should be evaluated in conjunction with the surgeon/proceduralist/anesthesiologist to determine the urgency of the procedure.

## 2022-01-28 NOTE — PROGRESS NOTES
PRE OP INSTRUCTION SHEET   1. Do not eat or drink anything after 12 midnight  prior to surgery. This includes no water, chewing gum or mints. 2. Take the following pills will a small sip of water (see MAR)                                        3. Aspirin, Ibuprofen, Advil, Naproxen, Vitamin E, fish oil and other Anti-inflammatory products should be stopped for 5 days before surgery or as directed by your physician. 4. Check with your Doctor regarding stopping Plavix, Coumadin, Lovenox, Fragmin or other blood thinners   5. Do not smoke, and do not drink any alcoholic beverages 24 hours prior to surgery. This includes NA Beer. 6. You may brush your teeth and gargle the morning of surgery. DO NOT SWALLOW WATER   7. You MUST make arrangements for a responsible adult to take you home after your surgery. You will not be allowed to leave alone or drive yourself home. It is strongly suggested someone stay with you the first 24 hrs. Your surgery will be cancelled if you do not have a ride home. 8. A parent/legal guardian must accompany a child scheduled for surgery and plan to stay at the hospital until the child is discharged. Please do not bring other children with you. 9. Please wear simple, loose fitting clothing to the hospital.  Verla Smoke not bring valuables (money, credit cards, checkbooks, etc.) Do not wear any makeup (including no eye makeup) or nail polish on your fingers or toes. 10. DO NOT wear any jewelry or piercings on day of surgery. All body piercing jewelry must be removed. 11. If you have dentures,glasses, or contacts they will be removed before going to the OR; we will provide you a container. 12. Please see your family doctor/and cardiologist for a history & physical and/or concerning medications. Bring any test results/reports from your physician's office. Have history and labs faxed to [phone].  Remember to bring Blood Bank bracelet on the day of surgery. 14. If you have a Living Will and Durable Power of  for Healthcare, please bring in a copy. 13. Notify your Surgeon if you develop any illness between now and surgery  time, cough, cold, fever, sore throat, nausea, vomiting, etc.  Please notify your surgeon if you experience dizziness, shortness of breath or blurred vision between now & the time of your surgery   16. DO NOT shave your operative site 96 hours prior to surgery. For face & neck surgery, men may use an electric razor 48 hours prior to surgery. 17. Shower with _x__Antibacterial soap (x_chlorhexidine for total joint  Pt's) shower two times before surgery.(the morning of and the night before. 18. To provide excellent care visitors will be limited to one in the room at any given time.   Please call pre admission testing if you any further questions [phone] or [phone]

## 2022-01-31 ENCOUNTER — APPOINTMENT (OUTPATIENT)
Dept: GENERAL RADIOLOGY | Age: 82
End: 2022-01-31
Attending: SURGERY
Payer: MEDICARE

## 2022-01-31 ENCOUNTER — HOSPITAL ENCOUNTER (OUTPATIENT)
Age: 82
Setting detail: OUTPATIENT SURGERY
Discharge: HOME | End: 2022-01-31
Attending: SURGERY | Admitting: SURGERY
Payer: MEDICARE

## 2022-01-31 ENCOUNTER — ANESTHESIA (OUTPATIENT)
Dept: OPERATING ROOM | Age: 82
End: 2022-01-31
Payer: MEDICARE

## 2022-01-31 VITALS
HEART RATE: 77 BPM | OXYGEN SATURATION: 99 % | DIASTOLIC BLOOD PRESSURE: 80 MMHG | RESPIRATION RATE: 20 BRPM | WEIGHT: 152 LBS | BODY MASS INDEX: 23.86 KG/M2 | SYSTOLIC BLOOD PRESSURE: 149 MMHG | TEMPERATURE: 98.6 F | HEIGHT: 67 IN

## 2022-01-31 VITALS
DIASTOLIC BLOOD PRESSURE: 65 MMHG | RESPIRATION RATE: 21 BRPM | OXYGEN SATURATION: 100 % | SYSTOLIC BLOOD PRESSURE: 153 MMHG

## 2022-01-31 LAB
GLUCOSE BLD-MCNC: 104 MG/DL (ref 70–99)
PERFORMED ON: ABNORMAL

## 2022-01-31 PROCEDURE — 36561 INSERT TUNNELED CV CATH: CPT | Performed by: SURGERY

## 2022-01-31 PROCEDURE — 2580000003 HC RX 258: Performed by: SURGERY

## 2022-01-31 PROCEDURE — 77001 FLUOROGUIDE FOR VEIN DEVICE: CPT

## 2022-01-31 PROCEDURE — 71045 X-RAY EXAM CHEST 1 VIEW: CPT

## 2022-01-31 PROCEDURE — 2580000003 HC RX 258: Performed by: ANESTHESIOLOGY

## 2022-01-31 PROCEDURE — C1894 INTRO/SHEATH, NON-LASER: HCPCS | Performed by: SURGERY

## 2022-01-31 PROCEDURE — 2580000003 HC RX 258: Performed by: NURSE ANESTHETIST, CERTIFIED REGISTERED

## 2022-01-31 PROCEDURE — 2500000003 HC RX 250 WO HCPCS: Performed by: NURSE ANESTHETIST, CERTIFIED REGISTERED

## 2022-01-31 PROCEDURE — 6360000002 HC RX W HCPCS: Performed by: NURSE ANESTHETIST, CERTIFIED REGISTERED

## 2022-01-31 PROCEDURE — 2500000003 HC RX 250 WO HCPCS: Performed by: SURGERY

## 2022-01-31 PROCEDURE — 77001 FLUOROGUIDE FOR VEIN DEVICE: CPT | Performed by: SURGERY

## 2022-01-31 PROCEDURE — 6360000002 HC RX W HCPCS: Performed by: SURGERY

## 2022-01-31 PROCEDURE — 7100000010 HC PHASE II RECOVERY - FIRST 15 MIN: Performed by: SURGERY

## 2022-01-31 PROCEDURE — 2500000003 HC RX 250 WO HCPCS: Performed by: ANESTHESIOLOGY

## 2022-01-31 PROCEDURE — C1788 PORT, INDWELLING, IMP: HCPCS | Performed by: SURGERY

## 2022-01-31 PROCEDURE — 3600000002 HC SURGERY LEVEL 2 BASE: Performed by: SURGERY

## 2022-01-31 PROCEDURE — 3700000001 HC ADD 15 MINUTES (ANESTHESIA): Performed by: SURGERY

## 2022-01-31 PROCEDURE — 3700000000 HC ANESTHESIA ATTENDED CARE: Performed by: SURGERY

## 2022-01-31 PROCEDURE — 2709999900 HC NON-CHARGEABLE SUPPLY: Performed by: SURGERY

## 2022-01-31 PROCEDURE — 7100000011 HC PHASE II RECOVERY - ADDTL 15 MIN: Performed by: SURGERY

## 2022-01-31 PROCEDURE — A4217 STERILE WATER/SALINE, 500 ML: HCPCS | Performed by: SURGERY

## 2022-01-31 PROCEDURE — 3600000012 HC SURGERY LEVEL 2 ADDTL 15MIN: Performed by: SURGERY

## 2022-01-31 DEVICE — PORT INFUS OD2.7MM ID1.5MM INTRO 8FR TI POLYUR CATH DETACH CT80STPD] ANGIODYNAMICS INC]: Type: IMPLANTABLE DEVICE | Site: CHEST  WALL | Status: FUNCTIONAL

## 2022-01-31 RX ORDER — LIDOCAINE HYDROCHLORIDE 20 MG/ML
INJECTION, SOLUTION INFILTRATION; PERINEURAL PRN
Status: DISCONTINUED | OUTPATIENT
Start: 2022-01-31 | End: 2022-01-31 | Stop reason: SDUPTHER

## 2022-01-31 RX ORDER — SODIUM CHLORIDE 0.9 % (FLUSH) 0.9 %
10 SYRINGE (ML) INJECTION EVERY 12 HOURS SCHEDULED
Status: DISCONTINUED | OUTPATIENT
Start: 2022-01-31 | End: 2022-01-31 | Stop reason: HOSPADM

## 2022-01-31 RX ORDER — ONDANSETRON 2 MG/ML
INJECTION INTRAMUSCULAR; INTRAVENOUS PRN
Status: DISCONTINUED | OUTPATIENT
Start: 2022-01-31 | End: 2022-01-31 | Stop reason: SDUPTHER

## 2022-01-31 RX ORDER — HEPARIN SODIUM (PORCINE) LOCK FLUSH IV SOLN 100 UNIT/ML 100 UNIT/ML
SOLUTION INTRAVENOUS PRN
Status: DISCONTINUED | OUTPATIENT
Start: 2022-01-31 | End: 2022-01-31 | Stop reason: ALTCHOICE

## 2022-01-31 RX ORDER — MEPERIDINE HYDROCHLORIDE 25 MG/ML
12.5 INJECTION INTRAMUSCULAR; INTRAVENOUS; SUBCUTANEOUS EVERY 5 MIN PRN
Status: DISCONTINUED | OUTPATIENT
Start: 2022-01-31 | End: 2022-01-31 | Stop reason: HOSPADM

## 2022-01-31 RX ORDER — PROPOFOL 10 MG/ML
INJECTION, EMULSION INTRAVENOUS PRN
Status: DISCONTINUED | OUTPATIENT
Start: 2022-01-31 | End: 2022-01-31 | Stop reason: SDUPTHER

## 2022-01-31 RX ORDER — OXYCODONE HYDROCHLORIDE AND ACETAMINOPHEN 5; 325 MG/1; MG/1
2 TABLET ORAL PRN
Status: DISCONTINUED | OUTPATIENT
Start: 2022-01-31 | End: 2022-01-31 | Stop reason: HOSPADM

## 2022-01-31 RX ORDER — SODIUM CHLORIDE, SODIUM LACTATE, POTASSIUM CHLORIDE, CALCIUM CHLORIDE 600; 310; 30; 20 MG/100ML; MG/100ML; MG/100ML; MG/100ML
INJECTION, SOLUTION INTRAVENOUS CONTINUOUS PRN
Status: DISCONTINUED | OUTPATIENT
Start: 2022-01-31 | End: 2022-01-31 | Stop reason: SDUPTHER

## 2022-01-31 RX ORDER — SODIUM CHLORIDE 9 MG/ML
25 INJECTION, SOLUTION INTRAVENOUS PRN
Status: DISCONTINUED | OUTPATIENT
Start: 2022-01-31 | End: 2022-01-31 | Stop reason: HOSPADM

## 2022-01-31 RX ORDER — SODIUM CHLORIDE 0.9 % (FLUSH) 0.9 %
10 SYRINGE (ML) INJECTION PRN
Status: DISCONTINUED | OUTPATIENT
Start: 2022-01-31 | End: 2022-01-31 | Stop reason: HOSPADM

## 2022-01-31 RX ORDER — PROMETHAZINE HYDROCHLORIDE 25 MG/ML
6.25 INJECTION, SOLUTION INTRAMUSCULAR; INTRAVENOUS
Status: DISCONTINUED | OUTPATIENT
Start: 2022-01-31 | End: 2022-01-31 | Stop reason: HOSPADM

## 2022-01-31 RX ORDER — ONDANSETRON 2 MG/ML
4 INJECTION INTRAMUSCULAR; INTRAVENOUS
Status: DISCONTINUED | OUTPATIENT
Start: 2022-01-31 | End: 2022-01-31 | Stop reason: HOSPADM

## 2022-01-31 RX ORDER — OXYCODONE HYDROCHLORIDE AND ACETAMINOPHEN 5; 325 MG/1; MG/1
1 TABLET ORAL PRN
Status: DISCONTINUED | OUTPATIENT
Start: 2022-01-31 | End: 2022-01-31 | Stop reason: HOSPADM

## 2022-01-31 RX ORDER — FENTANYL CITRATE 50 UG/ML
25 INJECTION, SOLUTION INTRAMUSCULAR; INTRAVENOUS EVERY 5 MIN PRN
Status: DISCONTINUED | OUTPATIENT
Start: 2022-01-31 | End: 2022-01-31 | Stop reason: HOSPADM

## 2022-01-31 RX ORDER — SODIUM CHLORIDE, SODIUM LACTATE, POTASSIUM CHLORIDE, CALCIUM CHLORIDE 600; 310; 30; 20 MG/100ML; MG/100ML; MG/100ML; MG/100ML
INJECTION, SOLUTION INTRAVENOUS CONTINUOUS
Status: DISCONTINUED | OUTPATIENT
Start: 2022-01-31 | End: 2022-01-31 | Stop reason: HOSPADM

## 2022-01-31 RX ORDER — HYDRALAZINE HYDROCHLORIDE 20 MG/ML
5 INJECTION INTRAMUSCULAR; INTRAVENOUS EVERY 10 MIN PRN
Status: DISCONTINUED | OUTPATIENT
Start: 2022-01-31 | End: 2022-01-31 | Stop reason: HOSPADM

## 2022-01-31 RX ADMIN — LIDOCAINE HYDROCHLORIDE 60 MG: 20 INJECTION, SOLUTION INFILTRATION; PERINEURAL at 08:24

## 2022-01-31 RX ADMIN — ONDANSETRON HYDROCHLORIDE 4 MG: 2 INJECTION, SOLUTION INTRAMUSCULAR; INTRAVENOUS at 08:40

## 2022-01-31 RX ADMIN — SODIUM CHLORIDE, POTASSIUM CHLORIDE, SODIUM LACTATE AND CALCIUM CHLORIDE: 600; 310; 30; 20 INJECTION, SOLUTION INTRAVENOUS at 07:13

## 2022-01-31 RX ADMIN — PROPOFOL 20 MG: 10 INJECTION, EMULSION INTRAVENOUS at 08:32

## 2022-01-31 RX ADMIN — VANCOMYCIN HYDROCHLORIDE 1000 MG: 1 INJECTION, POWDER, LYOPHILIZED, FOR SOLUTION INTRAVENOUS at 08:16

## 2022-01-31 RX ADMIN — FAMOTIDINE 20 MG: 10 INJECTION, SOLUTION INTRAVENOUS at 07:13

## 2022-01-31 RX ADMIN — PROPOFOL 40 MG: 10 INJECTION, EMULSION INTRAVENOUS at 08:24

## 2022-01-31 RX ADMIN — PROPOFOL 20 MG: 10 INJECTION, EMULSION INTRAVENOUS at 08:36

## 2022-01-31 RX ADMIN — SODIUM CHLORIDE, POTASSIUM CHLORIDE, SODIUM LACTATE AND CALCIUM CHLORIDE: 600; 310; 30; 20 INJECTION, SOLUTION INTRAVENOUS at 08:21

## 2022-01-31 RX ADMIN — SODIUM CHLORIDE, POTASSIUM CHLORIDE, SODIUM LACTATE AND CALCIUM CHLORIDE: 600; 310; 30; 20 INJECTION, SOLUTION INTRAVENOUS at 07:15

## 2022-01-31 ASSESSMENT — PULMONARY FUNCTION TESTS
PIF_VALUE: 0
PIF_VALUE: 1
PIF_VALUE: 0
PIF_VALUE: 1
PIF_VALUE: 1

## 2022-01-31 ASSESSMENT — PAIN - FUNCTIONAL ASSESSMENT
PAIN_FUNCTIONAL_ASSESSMENT: 0-10
PAIN_FUNCTIONAL_ASSESSMENT: 0-10

## 2022-01-31 ASSESSMENT — LIFESTYLE VARIABLES: SMOKING_STATUS: 0

## 2022-01-31 NOTE — OP NOTE
[address]                 [address] [facility] [address]                                OPERATIVE REPORT    PATIENT NAME: Avery Jimenez                    :        1940  MED REC NO:   [id number]                          ROOM:  ACCOUNT NO:   [de-identified]                           ADMIT DATE: 2022  PROVIDER:     Saige Oscar MD    DATE OF PROCEDURE:  2022    PREOPERATIVE DIAGNOSIS:  Rectosigmoid colon cancer. POSTOPERATIVE DIAGNOSIS:  Rectosigmoid colon cancer. OPERATION PERFORMED:  1. Insertion of left subclavian Port-A-Cath. 2.  Surgeon's use of fluoroscopy. SURGEON:  Saige Oscar MD    ANESTHESIA:  Total intravenous anesthesia. COMPLICATIONS:  None. ESTIMATED BLOOD LOSS:  Less than 50 mL. INDICATIONS FOR OPERATION:  An 81-year-old female recently diagnosed and  treated for rectosigmoid cancer. A Port-A-Cath is requested to  facilitate adjuvant treatments. The risks and benefits were explained. The patient understood them, accepted them, and elected to proceed. DESCRIPTION OF OPERATION:  The patient was brought to the operating  room. Total intravenous anesthesia was initiated. She was placed in  Trendelenburg position. She was prepped and draped in usual surgical  sterile fashion. Local anesthetic was infiltrated in the left  subclavian area. A single venipuncture was used to cannulate the left  subclavian vein. The guidewire passed easily. Its position was  confirmed with fluoroscopy. An incision was made medial and lateral to  the exit site of the wire. A pocket was created on the left chest wall. The dilator and introducer were passed over the guidewire in a Seldinger  technique under fluoroscopic guidance. The dilator and guidewire were  removed. Catheter was inserted through the sheath. The sheath was  peeled away. Catheter was pulled back to length under fluoroscopic  guidance.   The catheter was cut and the hub and port were attached. Port was secured to the left chest wall with Vicryl suture. There was a  good draw and an easy flush through the system. The course of the  catheter was inspected from start to finish. There was no evident kink  or compromise. A 3-0 Vicryl was used to reapproximate subcutaneous  tissues. A 4-0 Vicryl was used to reapproximate the skin. Benzoin and  Steri-Strip dressing were placed. DISPOSITION:  The patient tolerated the procedure without any acute  complication. Chucky Morales MD    D: 01/31/2022 8:58:18       T: 01/31/2022 9:01:58     [id number]  Job#: [id number]     Doc#: [id number]    CC:  Damaris Sterling.  MD Allison

## 2022-01-31 NOTE — PROGRESS NOTES
Discharge instructions reviewed with patient and caregiver. Patient and caregiver verbalized understanding. All home medications have been reviewed, questions answered and patient voiced understanding. Given discharge instructions, and appointment times.

## 2022-01-31 NOTE — H&P
[facility] GENERAL SURGERY      The H&P was reviewed, the patient was examined, and no change has occurred in the patient's condition since the H&P was completed. The indications for the procedure were reviewed, and any questions were answered. I updated the progress note from 1/21/2022 from Dr. Salvador Ivey which is the H&P and is located in the media section.     Vitals:    01/31/22 0659   BP: (!) 146/71   Pulse: 76   Resp: 16   Temp: 97.2 °F (36.2 °C)   SpO2: 96%

## 2022-01-31 NOTE — ANESTHESIA PRE PROCEDURE
Department of Anesthesiology  Preprocedure Note       Name:  Severino Saez   Age:  [de-identified] y.o.  :  1940                                          MRN:  [id number]         Date:  2022      Surgeon: Eli Sosa MD    Procedure:  PORT INSERTION    Medications prior to admission:    Nutritional Supplements (ENSURE CLEAR) LIQD Drink one clear ensure daily   potassium chloride (KLOR-CON M) 10 MEQ extended release tablet Take 1 tablet by mouth daily   glipiZIDE (GLUCOTROL XL) 5 MG extended release tablet Take 1 tablet by mouth daily   LANTUS SOLOSTAR 100 UNIT/ML injection pen INJECT 10 UNITS INTO THE SKIN NIGHTLY   omeprazole (PRILOSEC) 40 MG delayed release capsule TAKE 1 CAPSULE BY MOUTH EVERY MORNING (BEFORE BREAKFAST)   hydroCHLOROthiazide (HYDRODIURIL) 25 MG tablet TAKE 1 TABLET BY MOUTH DAILY   levothyroxine (SYNTHROID) 100 MCG tablet TAKE 1 TABLET BY MOUTH DAILY     Allergies:     Food Color Pink Anaphylaxis    Shellfish Allergy Anaphylaxis     Heart scan    Atorvastatin      Other reaction(s): Weakness;  Leg weakness    Ramipril      Other reaction(s): Cough    Metformin Nausea And Vomiting     Problem List:      Type 2 diabetes mellitus with hyperglycemia (HCC)    Essential hypertension    Hypothyroidism    Family history of early CAD    Mixed hyperlipidemia    Precordial pain    Statin intolerance    Thyrotoxicosis    Chronic pain of right knee    Heart murmur    Vitamin D deficiency    Iron deficiency anemia    CKD (chronic kidney disease) stage 3, GFR 30-59 ml/min ([facility])    Abnormal weight loss    Gastroesophageal reflux disease with esophagitis    SBO (small bowel obstruction) (HCC)    Hypokalemia    Colonic mass    Carcinomatosis (HCC)    Moderate protein-calorie malnutrition (HCC)     Past Medical History:     Carcinomatosis (HCC)     CKD (chronic kidney disease) stage 3, GFR 30-59 ml/min ([facility]) 2019    Gastroesophageal reflux disease without esophagitis 7/20/2021    Hypertension     Hypothyroidism     Mixed hyperlipidemia     Obesity     Thyrotoxicosis     Type 2 diabetes mellitus without complication (HCC)     Type II or unspecified type diabetes mellitus without mention of complication, not stated as uncontrolled      Past Surgical History:     ANKLE FRACTURE SURGERY Right 2007    CARPAL TUNNEL RELEASE Left 1998    CATARACT REMOVAL WITH IMPLANT Right 03/01/2018     PHACO EMULSIFICATION OF CATARACT WITH INTRA OCULAR LENS IMPLANT RIGHT EYE    CATARACT REMOVAL WITH IMPLANT Left 06/14/2018    COLOSTOMY N/A 12/31/2021    EXPLORATORY LAPAROTOMY, DIVERTING LOOP COLOSTOMY, PERITONEAL BIOPSY performed by Kathe Colmenares MD at [address] [place] [address] ARTHROSCOPY Right 2015     Social History:     Smoking status: Never Smoker    Smokeless tobacco: Never Used   Substance Use Topics    Alcohol use: No                                Counseling given: Not Answered    Vital Signs (Current):    01/31/22 0659   BP: (!) 146/71   Pulse: 76   Resp: 16   Temp: 97.2 °F (36.2 °C)   TempSrc: Temporal   SpO2: 96%   Weight: 152 lb (68.9 kg)   Height: 5' 7\" (1.702 m)                                            BP Readings from Last 3 Encounters:   01/31/22 (!) 146/71   01/06/22 134/68   12/31/21 (!) 160/91     NPO Status: Time of last liquid consumption: 0530                        Time of last solid consumption: 1900                        Date of last liquid consumption: 01/31/22                        Date of last solid food consumption: 01/30/22    BMI:   Wt Readings from Last 3 Encounters:   01/31/22 152 lb (68.9 kg)   12/30/21 165 lb (74.8 kg)   07/20/21 170 lb (77.1 kg)     Body mass index is 23.81 kg/m².     CBC:    WBC 6.1 01/06/2022    HGB 9.9 01/06/2022    HCT 29.6 01/06/2022     01/06/2022     CMP:     01/06/2022    K 3.5 01/06/2022     01/06/2022    CO2 29 01/06/2022    BUN 6 01/06/2022    CREATININE 0.6 01/06/2022    GFRAA >60 01/06/2022    GLUCOSE 95 01/06/2022    PROT 5.4 01/06/2022    PROT 7.3 02/20/2013    CALCIUM 8.4 01/06/2022    BILITOT 0.3 01/06/2022    ALKPHOS 58 01/06/2022    AST 11 01/06/2022    ALT <5 01/06/2022     POC Tests:    01/31/22   0723   POCGLU 104*     COVID-19 Screening (If Applicable): COVID19 Not Detected 01/27/2022    COVID19 NOT DETECTED 12/30/2021     Anesthesia Evaluation  Patient summary reviewed and Nursing notes reviewed  Airway: Mallampati: II  TM distance: >3 FB   Neck ROM: full  Mouth opening: > = 3 FB Dental:    (+) edentulous      Pulmonary: breath sounds clear to auscultation      (-) COPD, asthma, sleep apnea and not a current smoker                           Cardiovascular:  Exercise tolerance: good (>4 METS),   (+) hypertension:,     (-) past MI, CABG/stent and  angina      Rhythm: regular                      Neuro/Psych:      (-) seizures, TIA and CVA           GI/Hepatic/Renal:   (+) GERD:,      (-) hepatitis and no renal disease       Endo/Other:    (+) DiabetesType II DM, , hypothyroidism, blood dyscrasia: anemia:., malignancy/cancer. Abdominal:             Vascular: Other Findings:           Anesthesia Plan      general and TIVA     ASA 3     (MAC/TIVA->GA/LMA if needed)  Induction: intravenous. MIPS: Prophylactic antiemetics administered. Anesthetic plan and risks discussed with patient. Plan discussed with CRNA.             Hugo Wilson MD

## 2022-01-31 NOTE — BRIEF OP NOTE
Brief Postoperative Note      Patient: Wei Lamar  YOB: 1940  MRN: [id number]    Date of Procedure: 1/31/2022    Pre-Op Diagnosis: COLON CANCER POOR VENOUS ACCESS    Post-Op Diagnosis: Same       Procedure(s):  PORT INSERTION    Surgeon(s):  Valentín Holliday MD    Assistant:  Surgical Assistant: Talha Loving    Anesthesia: Monitor Anesthesia Care    Estimated Blood Loss (mL): Minimal    Complications: None    Specimens:   * No specimens in log *    Implants:  Implant Name Type Inv. Item Serial No.  Lot No. LRB No. Used Action   PORT INFUS OD2.7MM ID1. 5MM INTRO 8FR TI POLYUR CATH DETACH [[id number]] [[employer]]  PORT INFUS OD2.7MM ID1. 5MM INTRO 8FR TI POLYUR CATH DETACH [[id number]] [[employer]]  [employer] [id number] Left 1 Implanted         Drains:   Colostomy LLQ Loop (Active)   Stomal Appliance 2 piece 01/06/22 0902   Stoma  Assessment Red 01/06/22 0902   Mucocutaneous Junction Intact 01/06/22 0902   Peristomal Assessment Clean 01/05/22 2036   Treatment Liquid skin barrier 01/05/22 2036   Stool Appearance Loose 01/06/22 0902   Stool Color Brown 01/06/22 1704   Stool Amount Medium 01/06/22 1704   Output (mL) 100 ml 01/06/22 1704       [REMOVED] Urethral Catheter Non-latex 16 fr (Removed)   $ Urethral catheter insertion Inserted for procedure 01/01/22 7974   Catheter Indications Perioperative use for selected surgical procedures 01/01/22 1945   Securement Device Date Changed 12/31/21 12/31/21 2106   Urine Color Yellow 01/02/22 0933   Urine Appearance Clear 01/02/22 0933   Output (mL) 700 mL 01/02/22 0933       [REMOVED] External Urinary Catheter (Removed)   Catheter changed  Yes 01/04/22 0620   Output (mL) 300 mL 01/04/22 0620   Placement Replaced 01/04/22 5254       Findings: As above    Electronically signed by Maria Guadalupe Bai MD on 1/31/2022 at 8:42 AM

## 2022-02-03 ENCOUNTER — CARE COORDINATION (OUTPATIENT)
Dept: CASE MANAGEMENT | Age: 82
End: 2022-02-03

## 2022-02-03 NOTE — CARE COORDINATION
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 -> home with [facility]  Discharge Date: 22 RARS: Readmission Risk Score: 15.3 ( )    CTN attempted follow-up outreach to patient, but was unable to reach. Message left including CTN contact information for return call. No further CTN outreach scheduled. Referring to ROGER.      Javad Song, RN    Future Appointments   Date Time Provider Carlyn Kendrick   2/15/2022  1:00 PM PILY Murguia - CNP [place] T[facility] REYES

## 2022-02-07 ENCOUNTER — CARE COORDINATION (OUTPATIENT)
Dept: CARE COORDINATION | Age: 82
End: 2022-02-07

## 2022-02-07 SDOH — ECONOMIC STABILITY: FOOD INSECURITY: WITHIN THE PAST 12 MONTHS, YOU WORRIED THAT YOUR FOOD WOULD RUN OUT BEFORE YOU GOT MONEY TO BUY MORE.: NEVER TRUE

## 2022-02-07 SDOH — HEALTH STABILITY: MENTAL HEALTH
STRESS IS WHEN SOMEONE FEELS TENSE, NERVOUS, ANXIOUS, OR CAN'T SLEEP AT NIGHT BECAUSE THEIR MIND IS TROUBLED. HOW STRESSED ARE YOU?: ONLY A LITTLE

## 2022-02-07 SDOH — SOCIAL STABILITY: SOCIAL NETWORK: IN A TYPICAL WEEK, HOW MANY TIMES DO YOU TALK ON THE PHONE WITH FAMILY, FRIENDS, OR NEIGHBORS?: THREE TIMES A WEEK

## 2022-02-07 SDOH — ECONOMIC STABILITY: INCOME INSECURITY: IN THE LAST 12 MONTHS, WAS THERE A TIME WHEN YOU WERE NOT ABLE TO PAY THE MORTGAGE OR RENT ON TIME?: NO

## 2022-02-07 SDOH — SOCIAL STABILITY: SOCIAL NETWORK: HOW OFTEN DO YOU GET TOGETHER WITH FRIENDS OR RELATIVES?: THREE TIMES A WEEK

## 2022-02-07 SDOH — ECONOMIC STABILITY: TRANSPORTATION INSECURITY
IN THE PAST 12 MONTHS, HAS THE LACK OF TRANSPORTATION KEPT YOU FROM MEDICAL APPOINTMENTS OR FROM GETTING MEDICATIONS?: NO

## 2022-02-07 SDOH — HEALTH STABILITY: MENTAL HEALTH: HOW OFTEN DO YOU HAVE A DRINK CONTAINING ALCOHOL?: NEVER

## 2022-02-07 SDOH — HEALTH STABILITY: PHYSICAL HEALTH: ON AVERAGE, HOW MANY DAYS PER WEEK DO YOU ENGAGE IN MODERATE TO STRENUOUS EXERCISE (LIKE A BRISK WALK)?: 0 DAYS

## 2022-02-07 SDOH — SOCIAL STABILITY: SOCIAL NETWORK: ARE YOU MARRIED, WIDOWED, DIVORCED, SEPARATED, NEVER MARRIED, OR LIVING WITH A PARTNER?: DIVORCED

## 2022-02-07 SDOH — ECONOMIC STABILITY: HOUSING INSECURITY: IN THE LAST 12 MONTHS, HOW MANY PLACES HAVE YOU LIVED?: 1

## 2022-02-07 SDOH — ECONOMIC STABILITY: INCOME INSECURITY: HOW HARD IS IT FOR YOU TO PAY FOR THE VERY BASICS LIKE FOOD, HOUSING, MEDICAL CARE, AND HEATING?: NOT HARD AT ALL

## 2022-02-07 SDOH — HEALTH STABILITY: PHYSICAL HEALTH: ON AVERAGE, HOW MANY MINUTES DO YOU ENGAGE IN EXERCISE AT THIS LEVEL?: 0 MIN

## 2022-02-07 SDOH — ECONOMIC STABILITY: HOUSING INSECURITY
IN THE LAST 12 MONTHS, WAS THERE A TIME WHEN YOU DID NOT HAVE A STEADY PLACE TO SLEEP OR SLEPT IN A SHELTER (INCLUDING NOW)?: NO

## 2022-02-07 SDOH — ECONOMIC STABILITY: TRANSPORTATION INSECURITY
IN THE PAST 12 MONTHS, HAS LACK OF TRANSPORTATION KEPT YOU FROM MEETINGS, WORK, OR FROM GETTING THINGS NEEDED FOR DAILY LIVING?: NO

## 2022-02-07 NOTE — PATIENT INSTRUCTIONS
Patient Education        Diabetes Sick-Day Plan: Care Instructions  Your Care Instructions     If you have diabetes, many other illnesses can make your blood sugar go up. This can be dangerous. When you are sick with the flu or another illness, your body releases hormones to fight infection. These hormones raise blood sugar levels. They also make it hard for insulin or other medicines to lower your blood sugar. Work with your doctor to make a plan for what to do on days when you are sick. Follow-up care is a key part of your treatment and safety. Be sure to make and go to all appointments, and call your doctor if you are having problems. It's also a good idea to know your test results and keep a list of the medicines you take. How can you care for yourself at home? · Work with your doctor to write up a sick-day plan for what to do on days when you are sick. Your blood sugar can go up or down, depending on your illness and whether you can keep food down. Call your doctor when you are sick. Ask if you need to adjust your pills or insulin. · Write down the diabetes medicines you have been taking and whether you have changed the dose based on your sick-day plan. Have this information ready when you call your doctor. · Eat your normal types and amounts of food. Drink extra fluids, such as water, broth, and fruit juice, to prevent dehydration. ? If your blood sugar level is higher than the blood sugar level your doctor recommends (for example, above 240 milligrams per deciliter [mg/dL]), drink extra liquids that don't contain sugar. Examples are water and sugar-free cola. ? If you can't eat your usual foods, then drink extra liquids, such as soup, sports drinks, or milk. You may also eat food that is gentle on the stomach. These foods include crackers, gelatin dessert, and applesauce. Try to eat or drink 50 grams of carbohydrates every 3 to 4 hours.  For example, 6 saltine crackers, 1 cup (8 ounces) of milk, and ½ cup (4 ounces) of orange juice each contain about 15 grams of carbohydrate. · Check your blood sugar at least every 3 to 4 hours. If it goes up fast, check it more often. And check it even through the night. Take insulin if your doctor told you to do so. If you and your doctor didn't have a sick-day plan for taking extra insulin, call your doctor for advice. · If you take insulin, check your urine or blood for ketones. This is even more important if your blood sugar is high. · Do not take any over-the-counter medicines, such as pain relievers, decongestants, or herbal products or other natural medicines, without talking with your doctor first.  · Do not drive. If you need to see your doctor or go anywhere else, ask a family member or friend to drive you. When should you call for help? Call 911 anytime you think you may need emergency care. For example, call if:    · You passed out (lost consciousness).     · You are confused or cannot think clearly.     · Your blood sugar is very high or very low. Watch closely for changes in your health, and be sure to contact your doctor if:    · Your blood sugar stays outside the level your doctor set for you.     · You have any problems. Where can you learn more? Go to [email or web address] org and sign in to your [email or web address] account. Enter H600 in the DueDil box to learn more about \"Diabetes Sick-Day Plan: Care Instructions. \"     If you do not have an account, please click on the \"Sign Up Now\" link. Current as of: July 28, 2021               Content Version: 13.1  © 3690-7975 Healthwise, Incorporated. Care instructions adapted under license by [facility] ([facility]). If you have questions about a medical condition or this instruction, always ask your healthcare professional. [place] [address] any warranty or liability for your use of this information.          Patient Education        Learning About Meal Planning for diabetes educator about your concerns. Carbohydrate counting  With carbohydrate counting, you plan meals based on the amount of carbohydrate in each food. Carbohydrate raises blood sugar higher and more quickly than any other nutrient. It is found in desserts, breads and cereals, and fruit. It's also found in starchy vegetables such as potatoes and corn, grains such as rice and pasta, and milk and yogurt. Spreading carbohydrate throughout the day helps keep your blood sugar levels within your target range. Your daily amount depends on several things, including your weight, how active you are, which diabetes medicines you take, and what your goals are for your blood sugar levels. A registered dietitian or diabetes educator can help you plan how much carbohydrate to include in each meal and snack. A guideline for your daily amount of carbohydrate is:  · 45 to 60 grams at each meal. That's about the same as 3 to 4 carbohydrate servings. · 15 to 20 grams at each snack. That's about the same as 1 carbohydrate serving. The Nutrition Facts label on packaged foods tells you how much carbohydrate is in a serving of the food. First, look at the serving size on the food label. Is that the amount you eat in a serving? All of the nutrition information on a food label is based on that serving size. So if you eat more or less than that, you'll need to adjust the other numbers. Total carbohydrate is the next thing you need to look for on the label. If you count carbohydrate servings, one serving of carbohydrate is 15 grams. For foods that don't come with labels, such as fresh fruits and vegetables, you'll need a guide that lists carbohydrate in these foods. Ask your doctor, dietitian, or diabetes educator about books or other nutrition guides you can use. If you take insulin, you need to know how many grams of carbohydrate are in a meal. This lets you know how much rapid-acting insulin to take before you eat.  If you use an insulin pump, you get a constant rate of insulin during the day. So the pump must be programmed at meals to give you extra insulin to cover the rise in blood sugar after meals. When you know how much carbohydrate you will eat, you can take the right amount of insulin. Or, if you always use the same amount of insulin, you need to make sure that you eat the same amount of carbohydrate at meals. If you need more help to understand carbohydrate counting and food labels, ask your doctor, dietitian, or diabetes educator. How can you plan healthy meals? Here are some tips to get started:  · Plan your meals a week at a time. Don't forget to include snacks too. · Use cookbooks or online recipes to plan several main meals. Plan some quick meals for busy nights. You also can double some recipes that freeze well. Then you can save half for other busy nights when you don't have time to cook. · Make sure you have the ingredients you need for your recipes. If you're running low on basic items, put these items on your shopping list too. · List foods that you use to make breakfasts, lunches, and snacks. List plenty of fruits and vegetables. · Post this list on the refrigerator. Add to it as you think of more things you need. · Take the list to the store to do your weekly shopping. Follow-up care is a key part of your treatment and safety. Be sure to make and go to all appointments, and call your doctor if you are having problems. It's also a good idea to know your test results and keep a list of the medicines you take. Where can you learn more? Go to [email or web address] org and sign in to your [employer] account. Enter F163 in the KylesGuest of a Guest box to learn more about \"Learning About Meal Planning for Diabetes. \"     If you do not have an account, please click on the \"Sign Up Now\" link. Current as of: September 8, 2021               Content Version: 13.1  © 2990-7095 Healthwise, [employer]. Care instructions adapted under license by [facility] ([facility]). If you have questions about a medical condition or this instruction, always ask your healthcare professional. [address] any warranty or liability for your use of this information.

## 2022-02-07 NOTE — CARE COORDINATION
Ambulatory Care Coordination Note  2/7/2022  CM Risk Score: 2  Charlson 10 Year Mortality Risk Score: 100%     ACC: Wojciech Quick, RN    Summary Note: ACM spoke with patient for enrollment in care coordination. 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 He has spina bifida and complete care. He is verbal. She is primary caregiver for him and has a neighbor that helps some. She currently does not have nursing or caregiver support due to the pandemic and unable to replace her. He is currently on Waiver but is transitioning to [facility] care. 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 She has recovered well from her bowel surgery. She has tolerated the first chemo session with just nausea that has been improved with meds. She continues to deny needing additional supports for herself at this tori. She has a follow up this week with oncology. Lab Results   Component Value Date    LABA1C 8.7 12/30/2021    LABA1C 12.6 07/20/2021    LABA1C 8.1 09/16/2019     Lab Results   Component Value Date    .0 12/30/2021    .9 07/20/2021    .3 06/01/2018   We have discussed the need to check her blood sugars regularly. She states they have improved significantly with Lantus.      Past Medical History:   Diagnosis Date    Carcinomatosis ([facility]     CKD (chronic kidney disease) stage 3, GFR 30-59 ml/min ([facility]) 9/17/2019    Gastroesophageal reflux disease without esophagitis 7/20/2021    Hypertension     Hypothyroidism     Mixed hyperlipidemia     Obesity     Thyrotoxicosis     Type 2 diabetes mellitus without complication ([facility])     Type II or unspecified type diabetes mellitus without mention of complication, not stated as uncontrolled      Plan     Follow up call next week  Continue support with diabetes/education, zone tools   Continue to assess for additional support needs at home with her condition and age  Discuss plans she has in place for her son's care if she is not able to provide it. Offer dietician for nutritional needs during cancer treatment/diabetes           Ambulatory Care Coordination Assessment    Care Coordination Protocol  Program Enrollment: Rising Risk  Referral from Primary Care Provider: No  Week 1 - Initial Assessment     Do you have all of your prescriptions and are they filled?: Yes (Comment: enrolled 2/7/22. )  Barriers to medication adherence: None  Are you able to afford your medications?: Yes  How often do you have trouble taking your medications the way you have been told to take them?: I always take them as prescribed. Do you have Home O2 Therapy?: No      Ability to seek help/take action for Emergent Urgent situations i.e. fire, crime, inclement weather or health crisis. : Independent  Ability to ambulate to restroom: Independent  Ability handle personal hygeine needs (bathing/dressing/grooming): Independent  Ability to manage Medications: Independent  Ability to prepare Food Preparation: Independent  Ability to maintain home (clean home, laundry): Independent  Ability to drive and/or has transportation: Independent  Ability to do shopping: Independent  Ability to manage finances: Independent  Is patient able to live independently?: Yes     Current Housing: Private Residence  For whom are you the caregiver?: her son, Chasidy Matson.  spina bifida that is vent hs and trach during the day         Per the Fall Risk Screening, did the patient have 2 or more falls or 1 fall with injury in the past year?: No     Frequent urination at night?: No  Do you use rails/bars?: Yes  Do you have a non-slip tub mat?: Yes     Are you experiencing loss of meaning?: No  Are you experiencing loss of hope and peace?: No     Thinking about your patient's physical health needs, are there any symptoms or problems (risk indicators) you are unsure about that require further investigation?: Mild vague physical symptoms or problems; but do not impact on daily life or are not of concern to patient   Are the patients physical health problems impacting on their mental well-being?: No identified areas of concern   Are there any problems with your patients lifestyle behaviors (alcohol, drugs, diet, exercise) that are impacting on physical or mental well-being?: No identified areas of concern   Do you have any other concerns about your patients mental well-being? How would you rate their severity and impact on the patient?: No identified areas of concern   How would you rate their home environment in terms of safety and stability (including domestic violence, insecure housing, neighbor harassment)?: Safe, stable, but with some inconsistency   How do daily activities impact on the patient's well-being? (include current or anticipated unemployment, work, caregiving, access to transportation or other): Some general dissatisfaction but no concern   How would you rate their social network (family, work, friends)?: Adequate participation with social networks   How would you rate their financial resources (including ability to afford all required medical care)?: Financially secure, resources adequate, no identified problems   How wells does the patient now understand their health and well-being (symptoms, signs or risk factors) and what they need to do to manage their health?: Reasonable to good understanding and already engages in managing health or is willing to undertake better management   How well do you think your patient can engage in healthcare discussions?  (Barriers include language, deafness, aphasia, alcohol or drug problems, learning difficulties, concentration): Clear and open communication, no identified barriers   Do other services need to be involved to help this patient?: Other care/services not required at this time   Are current services involved with this patient well-coordinated? (Include coordination with other services you are now recommendation): All required care/services in place and well-coordinated   Suggested Interventions and [place]: Declined   Senior Services: Not Started   Social Work: Not Started   Transportation Services: Declined   Zone Management Tools: Not Started                  Prior to Admission medications    Medication Sig Start Date End Date Taking? Authorizing Provider   levothyroxine (SYNTHROID) 100 MCG tablet TAKE 1 TABLET BY MOUTH DAILY 2/7/22   PILY Flaherty CNP   omeprazole (PRILOSEC) 40 MG delayed release capsule TAKE 1 CAPSULE BY MOUTH EVERY MORNING (BEFORE BREAKFAST) 2/7/22   PILY Flaherty CNP   Nutritional Supplements (ENSURE CLEAR) LIQD Drink one clear ensure daily 1/12/22   PILY Flaherty CNP   potassium chloride (KLOR-CON M) 10 MEQ extended release tablet Take 1 tablet by mouth daily 1/6/22   Jennifer Krishnamurthy MD   glipiZIDE (GLUCOTROL XL) 5 MG extended release tablet Take 1 tablet by mouth daily 12/2/21   PILY Flaherty CNP   LANTUS SOLOSTAR 100 UNIT/ML injection pen INJECT 10 UNITS INTO THE SKIN NIGHTLY 10/18/21   PILY Flaherty CNP   hydroCHLOROthiazide (HYDRODIURIL) 25 MG tablet TAKE 1 TABLET BY MOUTH DAILY 7/28/21   PILY Flaherty CNP   Insulin Pen Needle ([employer] PEN NEEDLES 29G) 29G X 12MM MISC 1 each by Does not apply route daily 7/21/21   PILY Flaherty CNP   blood glucose test strips (ASCENSIA AUTODISC VI;ONE TOUCH ULTRA TEST VI) strip 1 each by In Vitro route daily As needed.  7/21/21   PILY Flaherty CNP       Future Appointments   Date Time Provider Carlyn Kendrick   2/15/2022  1:00 PM PILY Flaherty CNP [facility]      and   Diabetes Assessment    Medic Alert ID: No  Meal Planning: Avoidance of concentrated sweets, Plate Method   How often do you test your blood sugar?: Daily   Do you have barriers with adherence to non-pharmacologic self-management interventions?  (Nutrition/Exercise/Self-Monitoring): No   Have you ever had to go to the ED for symptoms of low blood sugar?: No       No patient-reported symptoms   Do you have hyperglycemia symptoms?: No   Do you have hypoglycemia symptoms?: No   Last Blood Sugar Value: 140   Blood Sugar Monitoring Regimen: Once a Day   Blood Sugar Trends: Steady Decrease

## 2022-03-14 DIAGNOSIS — Z79.4 TYPE 2 DIABETES MELLITUS WITH HYPERGLYCEMIA, WITH LONG-TERM CURRENT USE OF INSULIN (HCC): ICD-10-CM

## 2022-03-14 DIAGNOSIS — E11.65 TYPE 2 DIABETES MELLITUS WITH HYPERGLYCEMIA, WITH LONG-TERM CURRENT USE OF INSULIN (HCC): ICD-10-CM

## 2022-03-14 RX ORDER — GLIPIZIDE 5 MG/1
5 TABLET, FILM COATED, EXTENDED RELEASE ORAL DAILY
Qty: 90 TABLET | Refills: 2 | Status: SHIPPED | OUTPATIENT
Start: 2022-03-14

## 2022-03-23 ENCOUNTER — TELEMEDICINE (OUTPATIENT)
Dept: FAMILY MEDICINE CLINIC | Age: 82
End: 2022-03-23
Payer: MEDICARE

## 2022-03-23 DIAGNOSIS — Z00.00 MEDICARE ANNUAL WELLNESS VISIT, SUBSEQUENT: Primary | ICD-10-CM

## 2022-03-23 PROCEDURE — G0439 PPPS, SUBSEQ VISIT: HCPCS | Performed by: NURSE PRACTITIONER

## 2022-03-23 PROCEDURE — 4040F PNEUMOC VAC/ADMIN/RCVD: CPT | Performed by: NURSE PRACTITIONER

## 2022-03-23 PROCEDURE — G8484 FLU IMMUNIZE NO ADMIN: HCPCS | Performed by: NURSE PRACTITIONER

## 2022-03-23 PROCEDURE — 1123F ACP DISCUSS/DSCN MKR DOCD: CPT | Performed by: NURSE PRACTITIONER

## 2022-03-23 ASSESSMENT — PATIENT HEALTH QUESTIONNAIRE - PHQ9
1. LITTLE INTEREST OR PLEASURE IN DOING THINGS: 0
2. FEELING DOWN, DEPRESSED OR HOPELESS: 0
SUM OF ALL RESPONSES TO PHQ9 QUESTIONS 1 & 2: 0
SUM OF ALL RESPONSES TO PHQ QUESTIONS 1-9: 0

## 2022-03-23 ASSESSMENT — LIFESTYLE VARIABLES: HOW OFTEN DO YOU HAVE A DRINK CONTAINING ALCOHOL: NEVER

## 2022-03-23 NOTE — PATIENT INSTRUCTIONS
Advance Directives: Care Instructions  Overview  An advance directive is a legal way to state your wishes at the end of your life. It tells your family and your doctor what to do if you can't say what you want. There are two main types of advance directives. You can change them any time your wishes change. Living will. This form tells your family and your doctor your wishes about life support and other treatment. The form is also called a declaration. Medical power of . This form lets you name a person to make treatment decisions for you when you can't speak for yourself. This person is called a health care agent (health care proxy, health care surrogate). The form is also called a durable power of  for health care. If you do not have an advance directive, decisions about your medical care may be made by a family member, or by a doctor or a  who doesn't know you. It may help to think of an advance directive as a gift to the people who care for you. If you have one, they won't have to make tough decisions by themselves. Follow-up care is a key part of your treatment and safety. Be sure to make and go to all appointments, and call your doctor if you are having problems. It's also a good idea to know your test results and keep a list of the medicines you take. What should you include in an advance directive? Many states have a unique advance directive form. (It may ask you to address specific issues.) Or you might use a universal form that's approved by many states. If your form doesn't tell you what to address, it may be hard to know what to include in your advance directive. Use the questions below to help you get started. · Who do you want to make decisions about your medical care if you are not able to? · What life-support measures do you want if you have a serious illness that gets worse over time or can't be cured? · What are you most afraid of that might happen?  (Maybe you're afraid of having pain, losing your independence, or being kept alive by machines.)  · Where would you prefer to die? (Your home? A hospital? A nursing home?)  · Do you want to donate your organs when you die? · Do you want certain Sikhism practices performed before you die? When should you call for help? Be sure to contact your doctor if you have any questions. Where can you learn more? Go to [email or web address] and sign in to your [employer] account. Enter R264 in the [employer] box to learn more about \"Advance Directives: Care Instructions. \"     If you do not have an account, please click on the \"Sign Up Now\" link. Current as of: March 17, 2021               Content Version: 13.1  © 2942-5067 Healthwise, Incorporated. Care instructions adapted under license by [facility] ([facility]). If you have questions about a medical condition or this instruction, always ask your healthcare professional. [place] [address] any warranty or liability for your use of this information. Personalized Preventive Plan for Cecilio Dad - 3/23/2022  Medicare offers a range of preventive health benefits. Some of the tests and screenings are paid in full while other may be subject to a deductible, co-insurance, and/or copay. Some of these benefits include a comprehensive review of your medical history including lifestyle, illnesses that may run in your family, and various assessments and screenings as appropriate. After reviewing your medical record and screening and assessments performed today your provider may have ordered immunizations, labs, imaging, and/or referrals for you. A list of these orders (if applicable) as well as your Preventive Care list are included within your After Visit Summary for your review.     Other Preventive Recommendations:    · A preventive eye exam performed by an eye specialist is recommended every 1-2 years to screen for glaucoma; cataracts, macular degeneration, and other eye disorders. · A preventive dental visit is recommended every 6 months. · Try to get at least 150 minutes of exercise per week or 10,000 steps per day on a pedometer . · Order or download the FREE \"Exercise & Physical Activity: Your Everyday Guide\" from [employer]. Call 7-331.421.1886 or search [employer] online. · You need 6394-0832 mg of calcium and 3271-1166 IU of vitamin D per day. It is possible to meet your calcium requirement with diet alone, but a vitamin D supplement is usually necessary to meet this goal.  · When exposed to the sun, use a sunscreen that protects against both UVA and UVB radiation with an SPF of 30 or greater. Reapply every 2 to 3 hours or after sweating, drying off with a towel, or swimming. · Always wear a seat belt when traveling in a car. Always wear a helmet when riding a bicycle or motorcycle. Personalized Preventive Plan for Jone Higgins - 3/23/2022  Medicare offers a range of preventive health benefits. Some of the tests and screenings are paid in full while other may be subject to a deductible, co-insurance, and/or copay. Some of these benefits include a comprehensive review of your medical history including lifestyle, illnesses that may run in your family, and various assessments and screenings as appropriate. After reviewing your medical record and screening and assessments performed today your provider may have ordered immunizations, labs, imaging, and/or referrals for you. A list of these orders (if applicable) as well as your Preventive Care list are included within your After Visit Summary for your review. Other Preventive Recommendations:    A preventive eye exam performed by an eye specialist is recommended every 1-2 years to screen for glaucoma; cataracts, macular degeneration, and other eye disorders. A preventive dental visit is recommended every 6 months.   Try to get at least 150 minutes of exercise per week or 10,000 steps per day on a pedometer . Order or download the FREE \"Exercise & Physical Activity: Your Everyday Guide\" from [employer]. Call 4-398.807.4689 or search [employer] online. You need 8996-6209 mg of calcium and 1354-3704 IU of vitamin D per day. It is possible to meet your calcium requirement with diet alone, but a vitamin D supplement is usually necessary to meet this goal.  When exposed to the sun, use a sunscreen that protects against both UVA and UVB radiation with an SPF of 30 or greater. Reapply every 2 to 3 hours or after sweating, drying off with a towel, or swimming. Always wear a seat belt when traveling in a car. Always wear a helmet when riding a bicycle or motorcycle.

## 2022-03-23 NOTE — PROGRESS NOTES
Medicare Annual Wellness Visit Audio Only    Valerie Ayers is here for Medicare AWV    Assessment & Plan   Medicare annual wellness visit, subsequent      Recommendations for Preventive Services Due: see orders and patient instructions/AVS.  Recommended screening schedule for the next 5-10 years is provided to the patient in written form: see Patient Instructions/AVS.     Return for Medicare Annual Wellness Visit in 1 year. Subjective     Patient's complete Health Risk Assessment and screening values have been reviewed and are found in Flowsheets. The following problems were reviewed today and where indicated follow up appointments were made and/or referrals ordered. Positive Risk Factor Screenings with Interventions:               General Health and ACP:  General  In general, how would you say your health is?: Fair  In the past 7 days, have you experienced any of the following: New or Increased Pain, New or Increased Fatigue, Loneliness, Social Isolation, Stress or Anger?: No  Do you get the social and emotional support that you need?: Yes  Do you have a Living Will?: (!) No    Advance Directives     Power of  Living Will ACP-Advance Directive ACP-Power of     Not on File Filed on 02/18/22 Filed Not on File      General Health Risk Interventions:  · ACP documents completed ;  Angel Misty is working with social work to complete a living will     Health Habits/Nutrition:     Physical Activity: Inactive    Days of Exercise per Week: 0 days   [employer] of Exercise per Session: 0 min     Have you lost any weight without trying in the past 3 months?: (!) Yes     Have you seen the dentist within the past year?: N/A - wear dentures    Health Habits/Nutrition Interventions:  · Nutritional issues:  Patient is going to try to drink 1-2 Ensure a day in addition to her food; she is going through chemotherapy treatments     Hearing/Vision:  Do you or your family notice any trouble with your hearing that hasn't been managed with hearing aids?: No  Do you have difficulty driving, watching TV, or doing any of your daily activities because of your eyesight?: No  Have you had an eye exam within the past year?: (!) No  No exam data present    Hearing/Vision Interventions:  · Vision concerns:  patient encouraged to make appointment with his/her eye specialist    Safety:  Do you have working smoke detectors?: Yes  Do you have any tripping hazards - loose or unsecured carpets or rugs?: No  Do you have any tripping hazards - clutter in doorways, halls, or stairs?: No  Do you have either shower bars, grab bars, non-slip mats or non-slip surfaces in your shower or bathtub?: (!) No  Do all of your stairways have a railing or banister?: Yes  Do you always fasten your seatbelt when you are in a car?: Yes    Safety Interventions:  · Home safety tips provided           Objective      Patient-Reported Vitals  No data recorded     Allergies   Allergen Reactions    Food Color Pink Anaphylaxis    Shellfish Allergy Anaphylaxis     Heart scan    Atorvastatin      Other reaction(s): Weakness  Leg weakness    Ramipril      Other reaction(s): Cough    Metformin Nausea And Vomiting     Prior to Visit Medications    Medication Sig Taking?  Authorizing Provider   glipiZIDE (GLUCOTROL XL) 5 MG extended release tablet TAKE 1 TABLET BY MOUTH DAILY Yes PILY Beckett CNP   levothyroxine (SYNTHROID) 100 MCG tablet TAKE 1 TABLET BY MOUTH DAILY Yes PILY Beckett CNP   omeprazole (PRILOSEC) 40 MG delayed release capsule TAKE 1 CAPSULE BY MOUTH EVERY MORNING (BEFORE BREAKFAST) Yes PILY Beckett CNP   Nutritional Supplements (ENSURE CLEAR) LIQD Drink one clear ensure daily Yes PILY Beckett CNP   potassium chloride (KLOR-CON M) 10 MEQ extended release tablet Take 1 tablet by mouth daily Yes MD PATRICIA Toribio 100 UNIT/ML injection pen INJECT 10 UNITS INTO THE SKIN NIGHTLY Yes PILY Mccrary CNP   hydroCHLOROthiazide (HYDRODIURIL) 25 MG tablet TAKE 1 TABLET BY MOUTH DAILY Yes PILY Mccrary CNP   Insulin Pen Needle ([employer] PEN NEEDLES 29G) 29G X 12MM MISC 1 each by Does not apply route daily Yes PILY Mccrary CNP   blood glucose test strips (ASCENSIA AUTODISC VI;ONE TOUCH ULTRA TEST VI) strip 1 each by In Vitro route daily As needed. Yes PILY Mccrary CNP       CareTeam (Including outside providers/suppliers regularly involved in providing care):   Patient Care Team:  PILY Mccrary CNP as PCP - General (Nurse Practitioner)  PILY Mccrary CNP as PCP - [facility]  Familia Daley RN as Ambulatory Care Manager    Reviewed and updated this visit:  Renetta Ortiz, was evaluated through a synchronous (real-time) audio-video encounter. The patient (or guardian if applicable) is aware that this is a billable service, which includes applicable co-pays. This Virtual Visit was conducted with patient's (and/or legal guardian's) consent. The visit was conducted pursuant to the emergency declaration under the [facility] [address] [facility] authority and the [employer] General Act. Patient identification was verified, and a caregiver was present when appropriate. The patient was located at home in a state where the provider was licensed to provide care.

## 2022-03-25 ENCOUNTER — HOSPITAL ENCOUNTER (OUTPATIENT)
Dept: CT IMAGING | Age: 82
Discharge: HOME | End: 2022-03-25
Payer: MEDICARE

## 2022-03-25 ENCOUNTER — HOSPITAL ENCOUNTER (OUTPATIENT)
Age: 82
Discharge: HOME | End: 2022-03-25
Payer: MEDICARE

## 2022-03-25 DIAGNOSIS — C18.7 PRIMARY MALIGNANT NEOPLASM OF SIGMOID COLON (HCC): ICD-10-CM

## 2022-03-25 DIAGNOSIS — C18.7 MALIGNANT NEOPLASM OF SIGMOID COLON (HCC): ICD-10-CM

## 2022-03-25 LAB
BUN BLDV-MCNC: 15 MG/DL (ref 7–20)
CREAT SERPL-MCNC: 0.8 MG/DL (ref 0.6–1.2)
GFR AFRICAN AMERICAN: >60
GFR NON-AFRICAN AMERICAN: >60

## 2022-03-25 PROCEDURE — 36415 COLL VENOUS BLD VENIPUNCTURE: CPT

## 2022-03-25 PROCEDURE — 6360000004 HC RX CONTRAST MEDICATION: Performed by: INTERNAL MEDICINE

## 2022-03-25 PROCEDURE — 74177 CT ABD & PELVIS W/CONTRAST: CPT

## 2022-03-25 PROCEDURE — 82565 ASSAY OF CREATININE: CPT

## 2022-03-25 PROCEDURE — 84520 ASSAY OF UREA NITROGEN: CPT

## 2022-03-25 RX ADMIN — IOPAMIDOL 75 ML: 755 INJECTION, SOLUTION INTRAVENOUS at 11:24

## 2022-03-25 RX ADMIN — IOHEXOL 50 ML: 240 INJECTION, SOLUTION INTRATHECAL; INTRAVASCULAR; INTRAVENOUS; ORAL at 11:13

## 2022-03-29 ENCOUNTER — CARE COORDINATION (OUTPATIENT)
Dept: CARE COORDINATION | Age: 82
End: 2022-03-29

## 2022-03-29 NOTE — CARE COORDINATION
ACM attempted outreach. Left message. Contact information for call back provided. Follow up call next week  Continue support with diabetes/education, zone tools   Continue to assess for additional support needs at home with her condition and age  Discuss plans she has in place for her son's care if she is not able to provide it.    Offer dietician for nutritional needs during cancer treatment/diabetes

## 2022-05-02 DIAGNOSIS — E11.65 TYPE 2 DIABETES MELLITUS WITH HYPERGLYCEMIA, WITH LONG-TERM CURRENT USE OF INSULIN (HCC): ICD-10-CM

## 2022-05-02 DIAGNOSIS — K21.9 GASTROESOPHAGEAL REFLUX DISEASE WITHOUT ESOPHAGITIS: ICD-10-CM

## 2022-05-02 DIAGNOSIS — Z79.4 TYPE 2 DIABETES MELLITUS WITH HYPERGLYCEMIA, WITH LONG-TERM CURRENT USE OF INSULIN (HCC): ICD-10-CM

## 2022-05-02 RX ORDER — OMEPRAZOLE 40 MG/1
40 CAPSULE, DELAYED RELEASE ORAL
Qty: 90 CAPSULE | Refills: 1 | Status: SHIPPED | OUTPATIENT
Start: 2022-05-02 | End: 2022-11-01

## 2022-05-02 RX ORDER — INSULIN GLARGINE 100 [IU]/ML
10 INJECTION, SOLUTION SUBCUTANEOUS NIGHTLY
Qty: 15 ML | Refills: 1 | Status: SHIPPED | OUTPATIENT
Start: 2022-05-02

## 2022-05-04 ENCOUNTER — CARE COORDINATION (OUTPATIENT)
Dept: CARE COORDINATION | Age: 82
End: 2022-05-04

## 2022-05-04 NOTE — ACP (ADVANCE CARE PLANNING)
Advance Care Planning   Healthcare Decision Maker:    Primary Decision Maker: Pino Colin - Carie - [phone]    Click here to complete Healthcare Decision Makers including selection of the Healthcare Decision Maker Relationship (ie \"Primary\"). Patient stated her son is aware of her wishes and she does not request living will at this time. She is a [facility] and has appropriate documentation for this. Declined need for additional ADs at this time.

## 2022-05-04 NOTE — CARE COORDINATION
Ambulatory Care Coordination Note  5/4/2022  CM Risk Score: 2  Charlson 10 Year Mortality Risk Score: 100%     ACC: Verenice Frank RN  Summary Note: ACM spoke with patient for outreach today. She is tolerating her chemotherapy. She has completed 7 treatments at this time and has more scheduled. She has fatigue but it is manageable. Neuropathic pain in her hands (side effect of chemo). She reports that initial scans showed some improvement in her lymph node size with chemo. She is scheduled to scan again after treatments completed. Weight and appetitive have been stable. She has gained 9 lbs. Denies any shortness of breath or excessive swelling. She continues to decline any need for additional home supports for her care. Her son that she cares for was granted passport but only would be provided an aid 1 day a week. She is not getting any care for him at this time. He is on a vent and trached (spina bifida). He is alert and oriented and makes decisions in his care as well. We discussed today the plans in place for his care if she is not able to do so. We discussed ADs. She has addressed her code status. I verified that her son is listed as her decision maker, due to the concerns with his help status. She stated he is aware of her wishes as she is of his. They have plans for him to go to [facility] if needs arise and herself as well when that time arise. She has a neighbor that she has trained to suction her son and do routine care when she is gone for chemo or needs to run errands. She denies any additional care needs. Blood sugars stable. Reinforced need to report changes due to illness or stress.    Lab Results   Component Value Date    LABA1C 8.7 12/30/2021    LABA1C 12.6 07/20/2021    LABA1C 8.1 09/16/2019     Lab Results   Component Value Date    .0 12/30/2021    .9 07/20/2021    .3 06/01/2018     Plan      Follow up call in 2 weeks  Evaluate for discharge          Care Coordination Interventions Program Enrollment: Rising Risk  Referral from Primary Care Provider: No  Suggested Interventions and [address]: Declined  Senior Services: Not Started  Social Work: Not Started  Transportation Support: Declined  Zone Management Tools: Not Started (Comment: diabetes zone mailed )         Goals Addressed                 This Visit's Progress       Care Coordination     COMPLETED: Self Monitoring   On track     Self-Monitored Blood Glucose - I will check my blood sugar blood sugars ac & HS    Patient Reported Blood Glucose No flowsheet data found. Barriers: none  Plan for overcoming my barriers: N/A  Confidence: 6/10  Anticipated Goal Completion Date: 6/22     Blood sugars stable. Diabetes zone tool education completed. 5/4/22 trb        Wellness Goal   Improving     Patient Self-Management Goal for Health Maintenance  Goal: I will follow a healthy diet as discussed by my provider and report decreases or weight loss for additional nutritional support. Barriers: none  Plan for overcoming my barriers: N/A  Confidence: 6/10  Anticipated Goal Completion Date: 5/22    Weight is up 9 lbs. No loss of appetite with chemotherapy. Discussed need to report weight changes such as fast increases or loss ongoing. Encouraged use of supplements to prevent muscle loose and help with fatigue. 5/4/22 trb             Prior to Admission medications    Medication Sig Start Date End Date Taking?  Authorizing Provider   PATRICIA SOLOSTAR 100 UNIT/ML injection pen INJECT 10 UNITS INTO THE SKIN NIGHTLY 5/2/22   PILY Inman CNP   omeprazole (PRILOSEC) 40 MG delayed release capsule TAKE 1 CAPSULE BY MOUTH EVERY MORNING (BEFORE BREAKFAST) 5/2/22   PILY Inman CNP   glipiZIDE (GLUCOTROL XL) 5 MG extended release tablet TAKE 1 TABLET BY MOUTH DAILY 3/14/22   PILY Inman CNP   levothyroxine (SYNTHROID) 100 MCG tablet TAKE 1 TABLET BY MOUTH DAILY 2/7/22 PILY Anderson CNP   Nutritional Supplements (ENSURE CLEAR) LIQD Drink one clear ensure daily 1/12/22   PILY Anderson CNP   potassium chloride (KLOR-CON M) 10 MEQ extended release tablet Take 1 tablet by mouth daily 1/6/22   Dannielle Gifford MD   hydroCHLOROthiazide (HYDRODIURIL) 25 MG tablet TAKE 1 TABLET BY MOUTH DAILY 7/28/21   PILY Anderson CNP   Insulin Pen Needle ([employer] PEN NEEDLES 29G) 29G X 12MM MISC 1 each by Does not apply route daily 7/21/21   PILY Anderson CNP   blood glucose test strips (ASCENSIA AUTODISC VI;ONE TOUCH ULTRA TEST VI) strip 1 each by In Vitro route daily As needed. 7/21/21   PILY Anderson CNP       No future appointments. and   Diabetes Assessment    Medic Alert ID: No  Meal Planning: Avoidance of concentrated sweets, Plate Method   How often do you test your blood sugar?: Daily   Do you have barriers with adherence to non-pharmacologic self-management interventions?  (Nutrition/Exercise/Self-Monitoring): No   Have you ever had to go to the ED for symptoms of low blood sugar?: No       No patient-reported symptoms   Do you have hyperglycemia symptoms?: No   Do you have hypoglycemia symptoms?: No   Last Blood Sugar Value: 150   Blood Sugar Monitoring Regimen: Morning Fasting

## 2022-05-19 ENCOUNTER — TELEPHONE (OUTPATIENT)
Dept: FAMILY MEDICINE CLINIC | Age: 82
End: 2022-05-19

## 2022-05-19 DIAGNOSIS — E87.6 HYPOKALEMIA: Primary | ICD-10-CM

## 2022-05-19 RX ORDER — POTASSIUM CHLORIDE 750 MG/1
TABLET, EXTENDED RELEASE ORAL
Qty: 30 TABLET | Refills: 0 | Status: ON HOLD | OUTPATIENT
Start: 2022-05-19 | End: 2022-06-10 | Stop reason: HOSPADM

## 2022-05-19 NOTE — TELEPHONE ENCOUNTER
Pt called stating that she was at [facility] yesterday for an appt. States that they told her that her potassium was low. Pt was advised to contact PCP about starting on potassium. Pt uses [facility].  Call back [phone]

## 2022-05-19 NOTE — TELEPHONE ENCOUNTER
Potassium Rx sent - take 2 tablets by mouth X 3 days and then 1 tablet daily. Need Potassium rechecked in one week.

## 2022-06-07 ENCOUNTER — APPOINTMENT (OUTPATIENT)
Dept: GENERAL RADIOLOGY | Age: 82
DRG: 522 | End: 2022-06-07
Payer: MEDICARE

## 2022-06-07 ENCOUNTER — ANESTHESIA EVENT (OUTPATIENT)
Dept: OPERATING ROOM | Age: 82
DRG: 522 | End: 2022-06-07
Payer: MEDICARE

## 2022-06-07 ENCOUNTER — HOSPITAL ENCOUNTER (OUTPATIENT)
Age: 82
Discharge: HOME | DRG: 522 | End: 2022-06-07
Payer: MEDICARE

## 2022-06-07 ENCOUNTER — HOSPITAL ENCOUNTER (OUTPATIENT)
Dept: GENERAL RADIOLOGY | Age: 82
Discharge: HOME | DRG: 522 | End: 2022-06-07
Payer: MEDICARE

## 2022-06-07 ENCOUNTER — HOSPITAL ENCOUNTER (INPATIENT)
Age: 82
LOS: 3 days | Discharge: NURSING, REHAB OR LONG-TERM CARE | DRG: 522 | End: 2022-06-10
Attending: STUDENT IN AN ORGANIZED HEALTH CARE EDUCATION/TRAINING PROGRAM | Admitting: INTERNAL MEDICINE
Payer: MEDICARE

## 2022-06-07 DIAGNOSIS — E87.6 HYPOKALEMIA: ICD-10-CM

## 2022-06-07 DIAGNOSIS — E83.42 HYPOMAGNESEMIA: ICD-10-CM

## 2022-06-07 DIAGNOSIS — S72.001A CLOSED FRACTURE OF RIGHT HIP, INITIAL ENCOUNTER (HCC): Primary | ICD-10-CM

## 2022-06-07 DIAGNOSIS — C18.7 CANCER OF SIGMOID (HCC): ICD-10-CM

## 2022-06-07 LAB
A/G RATIO: 1.2 (ref 1.1–2.2)
ALBUMIN SERPL-MCNC: 3.7 G/DL (ref 3.4–5)
ALP BLD-CCNC: 120 U/L (ref 40–129)
ALT SERPL-CCNC: 17 U/L (ref 10–40)
ANION GAP SERPL CALCULATED.3IONS-SCNC: 12 MMOL/L (ref 3–16)
AST SERPL-CCNC: 37 U/L (ref 15–37)
BASOPHILS ABSOLUTE: 0 K/UL (ref 0–0.2)
BASOPHILS RELATIVE PERCENT: 0.5 %
BILIRUB SERPL-MCNC: 0.8 MG/DL (ref 0–1)
BUN BLDV-MCNC: 15 MG/DL (ref 7–20)
CALCIUM SERPL-MCNC: 9.2 MG/DL (ref 8.3–10.6)
CHLORIDE BLD-SCNC: 101 MMOL/L (ref 99–110)
CO2: 27 MMOL/L (ref 21–32)
CREAT SERPL-MCNC: 0.9 MG/DL (ref 0.6–1.2)
EOSINOPHILS ABSOLUTE: 0.1 K/UL (ref 0–0.6)
EOSINOPHILS RELATIVE PERCENT: 2.7 %
GFR AFRICAN AMERICAN: >60
GFR NON-AFRICAN AMERICAN: >60
GLUCOSE BLD-MCNC: 186 MG/DL (ref 70–99)
GLUCOSE BLD-MCNC: 90 MG/DL (ref 70–99)
HCT VFR BLD CALC: 32.1 % (ref 36–48)
HEMOGLOBIN: 10.8 G/DL (ref 12–16)
INFLUENZA A: NOT DETECTED
INFLUENZA B: NOT DETECTED
INR BLD: 1.04 (ref 0.87–1.14)
LYMPHOCYTES ABSOLUTE: 1.2 K/UL (ref 1–5.1)
LYMPHOCYTES RELATIVE PERCENT: 28.9 %
MAGNESIUM: 1.3 MG/DL (ref 1.8–2.4)
MAGNESIUM: 1.8 MG/DL (ref 1.8–2.4)
MCH RBC QN AUTO: 35.7 PG (ref 26–34)
MCHC RBC AUTO-ENTMCNC: 33.7 G/DL (ref 31–36)
MCV RBC AUTO: 106 FL (ref 80–100)
MONOCYTES ABSOLUTE: 0.6 K/UL (ref 0–1.3)
MONOCYTES RELATIVE PERCENT: 15.7 %
NEUTROPHILS ABSOLUTE: 2.1 K/UL (ref 1.7–7.7)
NEUTROPHILS RELATIVE PERCENT: 52.2 %
PDW BLD-RTO: 15.1 % (ref 12.4–15.4)
PERFORMED ON: NORMAL
PLATELET # BLD: 205 K/UL (ref 135–450)
PMV BLD AUTO: 7.3 FL (ref 5–10.5)
POTASSIUM REFLEX MAGNESIUM: 2.9 MMOL/L (ref 3.5–5.1)
POTASSIUM SERPL-SCNC: 3.5 MMOL/L (ref 3.5–5.1)
PROTHROMBIN TIME: 13.4 SEC (ref 11.7–14.5)
RBC # BLD: 3.02 M/UL (ref 4–5.2)
SARS-COV-2 RNA, RT PCR: NOT DETECTED
SODIUM BLD-SCNC: 140 MMOL/L (ref 136–145)
TOTAL PROTEIN: 6.7 G/DL (ref 6.4–8.2)
WBC # BLD: 4.1 K/UL (ref 4–11)

## 2022-06-07 PROCEDURE — 99285 EMERGENCY DEPT VISIT HI MDM: CPT

## 2022-06-07 PROCEDURE — 85025 COMPLETE CBC W/AUTO DIFF WBC: CPT

## 2022-06-07 PROCEDURE — 83036 HEMOGLOBIN GLYCOSYLATED A1C: CPT

## 2022-06-07 PROCEDURE — 73502 X-RAY EXAM HIP UNI 2-3 VIEWS: CPT

## 2022-06-07 PROCEDURE — 96365 THER/PROPH/DIAG IV INF INIT: CPT

## 2022-06-07 PROCEDURE — 87636 SARSCOV2 & INF A&B AMP PRB: CPT

## 2022-06-07 PROCEDURE — 6360000002 HC RX W HCPCS: Performed by: STUDENT IN AN ORGANIZED HEALTH CARE EDUCATION/TRAINING PROGRAM

## 2022-06-07 PROCEDURE — 2580000003 HC RX 258

## 2022-06-07 PROCEDURE — 6370000000 HC RX 637 (ALT 250 FOR IP): Performed by: STUDENT IN AN ORGANIZED HEALTH CARE EDUCATION/TRAINING PROGRAM

## 2022-06-07 PROCEDURE — 80053 COMPREHEN METABOLIC PANEL: CPT

## 2022-06-07 PROCEDURE — 93005 ELECTROCARDIOGRAM TRACING: CPT | Performed by: STUDENT IN AN ORGANIZED HEALTH CARE EDUCATION/TRAINING PROGRAM

## 2022-06-07 PROCEDURE — 84132 ASSAY OF SERUM POTASSIUM: CPT

## 2022-06-07 PROCEDURE — 73590 X-RAY EXAM OF LOWER LEG: CPT

## 2022-06-07 PROCEDURE — 85610 PROTHROMBIN TIME: CPT

## 2022-06-07 PROCEDURE — 82746 ASSAY OF FOLIC ACID SERUM: CPT

## 2022-06-07 PROCEDURE — 1200000000 HC SEMI PRIVATE

## 2022-06-07 PROCEDURE — 73552 X-RAY EXAM OF FEMUR 2/>: CPT

## 2022-06-07 PROCEDURE — 83735 ASSAY OF MAGNESIUM: CPT

## 2022-06-07 PROCEDURE — 99222 1ST HOSP IP/OBS MODERATE 55: CPT

## 2022-06-07 PROCEDURE — 99222 1ST HOSP IP/OBS MODERATE 55: CPT | Performed by: ORTHOPAEDIC SURGERY

## 2022-06-07 PROCEDURE — 36415 COLL VENOUS BLD VENIPUNCTURE: CPT

## 2022-06-07 PROCEDURE — 6360000002 HC RX W HCPCS

## 2022-06-07 PROCEDURE — 82607 VITAMIN B-12: CPT

## 2022-06-07 RX ORDER — SODIUM CHLORIDE 0.9 % (FLUSH) 0.9 %
10 SYRINGE (ML) INJECTION PRN
Status: DISCONTINUED | OUTPATIENT
Start: 2022-06-07 | End: 2022-06-08 | Stop reason: SDUPTHER

## 2022-06-07 RX ORDER — LEVOTHYROXINE SODIUM 0.1 MG/1
100 TABLET ORAL DAILY
Status: DISCONTINUED | OUTPATIENT
Start: 2022-06-08 | End: 2022-06-10 | Stop reason: HOSPADM

## 2022-06-07 RX ORDER — SODIUM CHLORIDE 0.9 % (FLUSH) 0.9 %
5-40 SYRINGE (ML) INJECTION EVERY 12 HOURS SCHEDULED
Status: DISCONTINUED | OUTPATIENT
Start: 2022-06-07 | End: 2022-06-08 | Stop reason: SDUPTHER

## 2022-06-07 RX ORDER — INSULIN LISPRO 100 [IU]/ML
0-6 INJECTION, SOLUTION INTRAVENOUS; SUBCUTANEOUS
Status: DISCONTINUED | OUTPATIENT
Start: 2022-06-08 | End: 2022-06-10 | Stop reason: HOSPADM

## 2022-06-07 RX ORDER — PANTOPRAZOLE SODIUM 40 MG/1
40 TABLET, DELAYED RELEASE ORAL
Status: DISCONTINUED | OUTPATIENT
Start: 2022-06-08 | End: 2022-06-10 | Stop reason: HOSPADM

## 2022-06-07 RX ORDER — MAGNESIUM SULFATE 1 G/100ML
1000 INJECTION INTRAVENOUS PRN
Status: DISCONTINUED | OUTPATIENT
Start: 2022-06-07 | End: 2022-06-10 | Stop reason: HOSPADM

## 2022-06-07 RX ORDER — SODIUM CHLORIDE 9 MG/ML
INJECTION, SOLUTION INTRAVENOUS PRN
Status: DISCONTINUED | OUTPATIENT
Start: 2022-06-07 | End: 2022-06-08 | Stop reason: SDUPTHER

## 2022-06-07 RX ORDER — DEXTROSE MONOHYDRATE 50 MG/ML
100 INJECTION, SOLUTION INTRAVENOUS PRN
Status: DISCONTINUED | OUTPATIENT
Start: 2022-06-07 | End: 2022-06-10 | Stop reason: HOSPADM

## 2022-06-07 RX ORDER — POLYETHYLENE GLYCOL 3350 17 G/17G
17 POWDER, FOR SOLUTION ORAL DAILY PRN
Status: DISCONTINUED | OUTPATIENT
Start: 2022-06-07 | End: 2022-06-10 | Stop reason: HOSPADM

## 2022-06-07 RX ORDER — HYDROCHLOROTHIAZIDE 25 MG/1
1 TABLET ORAL DAILY
Status: DISCONTINUED | OUTPATIENT
Start: 2022-06-08 | End: 2022-06-09

## 2022-06-07 RX ORDER — MULTIVITAMIN WITH IRON
100 TABLET ORAL DAILY
COMMUNITY

## 2022-06-07 RX ORDER — POTASSIUM CHLORIDE 7.45 MG/ML
10 INJECTION INTRAVENOUS PRN
Status: DISCONTINUED | OUTPATIENT
Start: 2022-06-07 | End: 2022-06-10 | Stop reason: HOSPADM

## 2022-06-07 RX ORDER — PERPHENAZINE 16 MG
600 TABLET ORAL DAILY
COMMUNITY

## 2022-06-07 RX ORDER — ACETAMINOPHEN 650 MG/1
650 SUPPOSITORY RECTAL EVERY 6 HOURS PRN
Status: DISCONTINUED | OUTPATIENT
Start: 2022-06-07 | End: 2022-06-10 | Stop reason: HOSPADM

## 2022-06-07 RX ORDER — ONDANSETRON 2 MG/ML
4 INJECTION INTRAMUSCULAR; INTRAVENOUS EVERY 6 HOURS PRN
Status: DISCONTINUED | OUTPATIENT
Start: 2022-06-07 | End: 2022-06-10 | Stop reason: HOSPADM

## 2022-06-07 RX ORDER — INSULIN GLARGINE 100 [IU]/ML
10 INJECTION, SOLUTION SUBCUTANEOUS NIGHTLY
Status: DISCONTINUED | OUTPATIENT
Start: 2022-06-07 | End: 2022-06-10 | Stop reason: HOSPADM

## 2022-06-07 RX ORDER — MAGNESIUM SULFATE IN WATER 40 MG/ML
2000 INJECTION, SOLUTION INTRAVENOUS ONCE
Status: COMPLETED | OUTPATIENT
Start: 2022-06-07 | End: 2022-06-07

## 2022-06-07 RX ORDER — SODIUM CHLORIDE 0.9 % (FLUSH) 0.9 %
5-40 SYRINGE (ML) INJECTION PRN
Status: CANCELLED | OUTPATIENT
Start: 2022-06-07

## 2022-06-07 RX ORDER — POTASSIUM CHLORIDE 7.45 MG/ML
10 INJECTION INTRAVENOUS
Status: DISPENSED | OUTPATIENT
Start: 2022-06-07 | End: 2022-06-07

## 2022-06-07 RX ORDER — INSULIN LISPRO 100 [IU]/ML
0-3 INJECTION, SOLUTION INTRAVENOUS; SUBCUTANEOUS NIGHTLY
Status: DISCONTINUED | OUTPATIENT
Start: 2022-06-07 | End: 2022-06-10 | Stop reason: HOSPADM

## 2022-06-07 RX ORDER — POTASSIUM CHLORIDE 7.45 MG/ML
10 INJECTION INTRAVENOUS ONCE
Status: COMPLETED | OUTPATIENT
Start: 2022-06-07 | End: 2022-06-07

## 2022-06-07 RX ORDER — POTASSIUM CHLORIDE 20 MEQ/1
40 TABLET, EXTENDED RELEASE ORAL ONCE
Status: COMPLETED | OUTPATIENT
Start: 2022-06-07 | End: 2022-06-07

## 2022-06-07 RX ORDER — ONDANSETRON 4 MG/1
4 TABLET, ORALLY DISINTEGRATING ORAL EVERY 8 HOURS PRN
Status: DISCONTINUED | OUTPATIENT
Start: 2022-06-07 | End: 2022-06-10 | Stop reason: HOSPADM

## 2022-06-07 RX ORDER — ENOXAPARIN SODIUM 100 MG/ML
40 INJECTION SUBCUTANEOUS DAILY
Status: DISCONTINUED | OUTPATIENT
Start: 2022-06-08 | End: 2022-06-08 | Stop reason: SDUPTHER

## 2022-06-07 RX ORDER — POTASSIUM CHLORIDE 20 MEQ/1
40 TABLET, EXTENDED RELEASE ORAL PRN
Status: DISCONTINUED | OUTPATIENT
Start: 2022-06-07 | End: 2022-06-10 | Stop reason: HOSPADM

## 2022-06-07 RX ORDER — POTASSIUM CHLORIDE 750 MG/1
10 TABLET, EXTENDED RELEASE ORAL DAILY
Status: DISCONTINUED | OUTPATIENT
Start: 2022-06-08 | End: 2022-06-08

## 2022-06-07 RX ORDER — ACETAMINOPHEN 325 MG/1
650 TABLET ORAL EVERY 6 HOURS PRN
Status: DISCONTINUED | OUTPATIENT
Start: 2022-06-07 | End: 2022-06-10 | Stop reason: HOSPADM

## 2022-06-07 RX ADMIN — SODIUM CHLORIDE: 9 INJECTION, SOLUTION INTRAVENOUS at 21:39

## 2022-06-07 RX ADMIN — POTASSIUM CHLORIDE 10 MEQ: 7.46 INJECTION, SOLUTION INTRAVENOUS at 16:54

## 2022-06-07 RX ADMIN — MAGNESIUM SULFATE HEPTAHYDRATE 2000 MG: 40 INJECTION, SOLUTION INTRAVENOUS at 16:50

## 2022-06-07 RX ADMIN — POTASSIUM CHLORIDE 10 MEQ: 7.46 INJECTION, SOLUTION INTRAVENOUS at 20:45

## 2022-06-07 RX ADMIN — POTASSIUM CHLORIDE 40 MEQ: 1500 TABLET, EXTENDED RELEASE ORAL at 16:38

## 2022-06-07 ASSESSMENT — PAIN DESCRIPTION - DESCRIPTORS: DESCRIPTORS: ACHING

## 2022-06-07 ASSESSMENT — PAIN DESCRIPTION - LOCATION
LOCATION: HIP
LOCATION: HIP

## 2022-06-07 ASSESSMENT — PAIN DESCRIPTION - ONSET: ONSET: ON-GOING

## 2022-06-07 ASSESSMENT — PAIN SCALES - GENERAL
PAINLEVEL_OUTOF10: 4
PAINLEVEL_OUTOF10: 2

## 2022-06-07 ASSESSMENT — PAIN - FUNCTIONAL ASSESSMENT: PAIN_FUNCTIONAL_ASSESSMENT: 0-10

## 2022-06-07 ASSESSMENT — PAIN DESCRIPTION - ORIENTATION
ORIENTATION: RIGHT
ORIENTATION: RIGHT

## 2022-06-07 ASSESSMENT — PAIN DESCRIPTION - FREQUENCY: FREQUENCY: INTERMITTENT

## 2022-06-07 NOTE — ED NOTES
1631 - Perfect serve sent to Dr. Dimas Davenport  06/07/22 [address] went ahead and spoke with Dr. Jackelin Jang about this pt also at [address] [facility]  06/07/22 1665

## 2022-06-07 NOTE — ED PROVIDER NOTES
[address] ED      CHIEF COMPLAINT  Hip fracture       HISTORY OF PRESENT ILLNESS  Sukhi Dillard is a [de-identified] y.o. 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 Patient denies fall or injury. States she she sat in a recliner and when she woke up her right ankle was swollen 3w and she was having pain in right hip. Nonradiating, 4/10, worse with walking with movement. No palliative factors. Was still able to ambulate with walker. Pain radiates down leg. Did not fall so denies other injury. Patient has not taken anything for pain    XR R hip/pelvis:   Impression   Impacted right femoral neck fracture.  Osteopenia. Old records reviewed: No pertinent information noted. No other complaints, modifying factors or associated symptoms. I have reviewed the following from the nursing documentation.     Past Medical History:   Diagnosis Date    Carcinomatosis ([address]     CKD (chronic kidney disease) stage 3, GFR 30-59 ml/min ([facility]) 9/17/2019    Gastroesophageal reflux disease without esophagitis 7/20/2021    Hypertension     Hypothyroidism     Mixed hyperlipidemia     Obesity     Thyrotoxicosis     Type 2 diabetes mellitus without complication ([address]     Type II or unspecified type diabetes mellitus without mention of complication, not stated as uncontrolled      Past Surgical History:   Procedure Laterality Date    ANKLE FRACTURE SURGERY Right 2007    CARPAL TUNNEL RELEASE Left 1998    CATARACT REMOVAL WITH IMPLANT Right 03/01/2018     PHACO EMULSIFICATION OF CATARACT WITH INTRA OCULAR LENS IMPLANT RIGHT EYE    CATARACT REMOVAL WITH IMPLANT Left 06/14/2018    COLOSTOMY N/A 12/31/2021    EXPLORATORY LAPAROTOMY, DIVERTING LOOP COLOSTOMY, PERITONEAL BIOPSY performed by Jose Oconnor MD at [address] [place] [address] Right 6/8/2022    RIGHT HIP BIPOLAR HEMIARTHROPLASTY performed by Ly Pyle MD at [facility] (CERVIX STATUS UNKNOWN)      KNEE ARTHROSCOPY Right 2015    PORT SURGERY Left 01/31/2022    [facility] Port placement    PORT SURGERY N/A 1/31/2022    PORT INSERTION performed by Roz Sarmiento MD at [facility] OR     Family History   Problem Relation Age of Onset    Diabetes Sister     Heart Disease Sister     High Blood Pressure Sister     Diabetes Brother      Social History     Socioeconomic History    Marital status:      Spouse name: Not on file    Number of children: Not on file    Years of education: Not on file    Highest education level: Not on file   Occupational History    Not on file   Tobacco Use    Smoking status: Never Smoker    Smokeless tobacco: Never Used   Vaping Use    Vaping Use: Never used   Substance and Sexual Activity    Alcohol use: No    Drug use: No    Sexual activity: Yes     Partners: Male   Other Topics Concern    Not on file   Social History Narrative    Not on file     Social Determinants of Health     Financial Resource Strain: Low Risk     Difficulty of Paying Living Expenses: Not hard at all   Food Insecurity: No Food Insecurity    Worried About [address] in the Last Year: Never true    [address] Empowered Careers in the Last Year: Never true   Transportation Needs: No Transportation Needs    Lack of Transportation (Medical): No    Lack of Transportation (Non-Medical): No   Physical Activity: Inactive    Days of Exercise per Week: 0 days    Minutes of Exercise per Session: 0 min   Stress: No Stress Concern Present    Feeling of Stress : Only a little   Social Connections: Unknown    Frequency of Communication with Friends and Family: Three times a week    Frequency of Social Gatherings with Friends and Family:  Three times a week    Attends Mor[employer] Services: Not on file    Active Member of Clubs or Organizations: Not on file    Attends Club or Organization Meetings: Not on file    Marital Status:    Intimate Partner Violence:     Fear of Current or Ex-Partner: Not on file    Emotionally Abused: Not on file    Physically Abused: Not on file    Sexually Abused: Not on file   Housing Stability: [address] Unable to Pay for Housing in the Last Year: No    Number of [place] in the Last Year: 1    Unstable Housing in the Last Year: No     No current facility-administered medications for this encounter. Current Outpatient Medications   Medication Sig Dispense Refill    potassium chloride (KLOR-CON M) 10 MEQ extended release tablet Take 2 tablets by mouth daily for 3 days and then take 1 tablet by mouth daily 30 tablet 0    LANTUS SOLOSTAR 100 UNIT/ML injection pen INJECT 10 UNITS INTO THE SKIN NIGHTLY 15 mL 1    omeprazole (PRILOSEC) 40 MG delayed release capsule TAKE 1 CAPSULE BY MOUTH EVERY MORNING (BEFORE BREAKFAST) 90 capsule 1    glipiZIDE (GLUCOTROL XL) 5 MG extended release tablet TAKE 1 TABLET BY MOUTH DAILY 90 tablet 2    levothyroxine (SYNTHROID) 100 MCG tablet TAKE 1 TABLET BY MOUTH DAILY 90 tablet 1    Nutritional Supplements (ENSURE CLEAR) LIQD Drink one clear ensure daily 30 each 0    hydroCHLOROthiazide (HYDRODIURIL) 25 MG tablet TAKE 1 TABLET BY MOUTH DAILY 90 tablet 1    Insulin Pen Needle ([employer] PEN NEEDLES 29G) 29G X 12MM MISC 1 each by Does not apply route daily 100 each 3    blood glucose test strips (ASCENSIA AUTODISC VI;ONE TOUCH ULTRA TEST VI) strip 1 each by In Vitro route daily As needed. 100 each 11     Allergies   Allergen Reactions    Food Color Pink Anaphylaxis    Shellfish Allergy Anaphylaxis     Heart scan    Atorvastatin      Other reaction(s): Weakness  Leg weakness    Ramipril      Other reaction(s): Cough    Metformin Nausea And Vomiting       REVIEW OF SYSTEMS  All systems reviewed, pertinent positives per HPI otherwise noted to be negative. PHYSICAL EXAM  GENERAL APPEARANCE: Chino Betancourt is in no acute respiratory distress.  Awake and alert. Answers questions appropriately. VITAL SIGNS: BP (!) 142/76   Pulse 89   Temp 98.8 °F (37.1 °C) (Oral)   Resp 16   Ht 5' 7\" (1.702 m)   Wt 153 lb (69.4 kg)   SpO2 98%   BMI 23.96 kg/m²   HEENT: Normocephalic, atraumatic. No Rodriguezs sign or Raccoon Eyes. No depressed skull fractures. Extraocular muscles are intact. Pupils equal round and reactive to light. Conjunctivas are pink. Negative scleral icterus. No epistaxis. No septal hematomas. No hemotympanum. Mucous membranes are moist. Tongue in the midline. Pharynx without erythema or exudates. No uvular deviation. NECK: Nontender and supple. No stridor or meningismus. Trachea in the midine. Cervical spine is non-tender to palpation in the midline. No abrasions. CHEST: Nontender to palpation. Clear to auscultation bilaterally. No rales, rhonchi, or wheezing. No abrasions. HEART: Regular rate and rhythm. No murmurs, gallops or rubs. Strong and equal pulses in the upper and lower extremities. ABDOMEN: Soft, nontender, nondistended, positive bowel sounds, no palpable pulsatile masses. No abrasions. MUSCULOSKELETAL: Right tib-fib and right hip tenderness to palpation cervical, thoracic, and lumbosacral spines are non-tender to palpation. Theres no edema, bruising, or step-offs. Upper and lower extremities have no deformities and they are non-tender to palpation. The calves are nontender to palpation. Active range of motion. NEUROLOGICAL: Awake, alert and oriented x 3. Power intact in the upper and lower extremities. Sensation is intact to light touch in the upper and lower extremities. GCS 15. IMMUNOLOGICAL: No palpable lymphadenopathy or lymphatic streaking. DERMATOLOGIC: No petechiae, rashes, or ecchymoses. No lacerations or abrasions. LABS  I have reviewed all labs for this visit.    Results for orders placed or performed during the hospital encounter of 06/07/22   CBC with Auto Differential   Result Value Ref Range    WBC 4.1 4.0 - 11.0 K/uL    RBC 3.02 (L) 4.00 - 5.20 M/uL    Hemoglobin 10.8 (L) 12.0 - 16.0 g/dL    Hematocrit 32.1 (L) 36.0 - 48.0 %    .0 (H) 80.0 - 100.0 fL    MCH 35.7 (H) 26.0 - 34.0 pg    MCHC 33.7 31.0 - 36.0 g/dL    RDW 15.1 12.4 - 15.4 %    Platelets 629 151 - 546 K/uL    MPV 7.3 5.0 - 10.5 fL    Neutrophils % 52.2 %    Lymphocytes % 28.9 %    Monocytes % 15.7 %    Eosinophils % 2.7 %    Basophils % 0.5 %    Neutrophils Absolute 2.1 1.7 - 7.7 K/uL    Lymphocytes Absolute 1.2 1.0 - 5.1 K/uL    Monocytes Absolute 0.6 0.0 - 1.3 K/uL    Eosinophils Absolute 0.1 0.0 - 0.6 K/uL    Basophils Absolute 0.0 0.0 - 0.2 K/uL   Comprehensive Metabolic Panel w/ Reflex to MG   Result Value Ref Range    Sodium 140 136 - 145 mmol/L    Potassium reflex Magnesium 2.9 (LL) 3.5 - 5.1 mmol/L    Chloride 101 99 - 110 mmol/L    CO2 27 21 - 32 mmol/L    Anion Gap 12 3 - 16    Glucose 186 (H) 70 - 99 mg/dL    BUN 15 7 - 20 mg/dL    CREATININE 0.9 0.6 - 1.2 mg/dL    GFR Non-African American >60 >60    GFR African American >60 >60    Calcium 9.2 8.3 - 10.6 mg/dL    Total Protein 6.7 6.4 - 8.2 g/dL    Albumin 3.7 3.4 - 5.0 g/dL    Albumin/Globulin Ratio 1.2 1.1 - 2.2    Total Bilirubin 0.8 0.0 - 1.0 mg/dL    Alkaline Phosphatase 120 40 - 129 U/L    ALT 17 10 - 40 U/L    AST 37 15 - 37 U/L   Protime-INR   Result Value Ref Range    Protime 13.4 11.7 - 14.5 sec    INR 1.04 0.87 - 1.14   Magnesium   Result Value Ref Range    Magnesium 1.30 (L) 1.80 - 2.40 mg/dL         ECG  The Ekg interpreted by me shows  normal sinus rhythm with a rate of 81  Axis is   Normal  QTc is  prolonged  Intervals and Durations are unremarkable. ST Segments: nonspecific changes  No significant change from prior EKG dated 12/30/21      RADIOLOGY    XR TIBIA FIBULA RIGHT (2 VIEWS)   Final Result   No acute osseous abnormality of the right tib-fib. Osteopenia. ED COURSE / MDM  Patient seen and evaluated.  Old records reviewed and pertinent information included in HPI. Labs and imaging reviewed and results discussed with patient. Overall well appearing patient, in no acute distress, presenting for right hip fracture seen on outpatient imaging. Physical exam remarkable for right hip and right tib-fib tenderness to palpation. Differential diagnosis includes but is not limited to: Pathologic fracture, traumatic fracture, osteoporosis, low suspicion for other injury    Workup showed:  ED Course as of 06/10/22 1250   Tue Jun 07, 2022   1621 Hypokalemia to 2.9, hypomagnesemia 1.3. Repletion will be given. No other electrolyte abnormalities or evidence of kidney dysfunction. [ER]   1621 Liver function testing unremarkable. [ER]   1621 Anemia to 10.8. No leukocytosis or thrombocytopenia. [ER]   1621 Coagulation studies within normal limits [ER]   1622 XR R tib/fib: IMPRESSION:  No acute osseous abnormality of the right tib-fib. Osteopenia. [ER]      ED Course User Index  [ER] Yael Neff MD         During the patient's ED course, the patient was given:  Medications   ceFAZolin (ANCEF) 2000 mg in sterile water 20 mL IV syringe (has no administration in time range)   magnesium sulfate 2000 mg in 50 mL IVPB premix (2,000 mg IntraVENous New Bag 6/7/22 1650)   potassium chloride 10 mEq/100 mL IVPB (Peripheral Line) (10 mEq IntraVENous New Bag 6/7/22 1654)   potassium chloride (KLOR-CON M) extended release tablet 40 mEq (40 mEq Oral Given 6/7/22 1638)      Discussed with Jerod Castillo of orthopedics. He ordered cefazolin. He did not recommend any further imaging at this time. Based on results of work-up, I am concerned for right hip fracture without known traumatic injury, possibly pathologic in the setting of known malignancy. At this time, do feel the patient requires admission for further work-up and management. Discussed the patient with hospital team,  patient to be admitted to [facility].     Is this patient to be included in the SEP-1 Core Measure due to severe sepsis or septic shock? No   Exclusion criteria - the patient is NOT to be included for SEP-1 Core Measure due to:  2+ SIRS criteria are not met      CLINICAL IMPRESSION  1. Closed fracture of right hip, initial encounter ([facility] [address]    2. Hypokalemia    3. Hypomagnesemia        Blood pressure (!) 150/74, pulse 86, temperature 98.8 °F (37.1 °C), temperature source Oral, resp. rate 18, height 5' 7\" (1.702 m), weight 153 lb (69.4 kg), SpO2 98 %. Kuldip Alejandre was admitted in stable condition. DISCLAIMER: This chart was created using Dragon dictation software. Efforts were made by me to ensure accuracy, however some errors may be present due to limitations of this technology and occasionally words are not transcribed correctly.           Tai Tobias MD  06/10/22 [address], [place]  06/10/22 [id number]

## 2022-06-07 NOTE — ACP (ADVANCE CARE PLANNING)
Advance Care Planning     General Advance Care Planning (ACP) Conversation    Date of Conversation: 6/7/2022  Conducted with: Patient with Decision Making Capacity    Healthcare Decision Maker:    Primary Decision Maker: Pino Colin - Carie - [phone]  Click here to complete Healthcare Decision Makers including selection of the Healthcare Decision Maker Relationship (ie \"Primary\"). Today we documented Decision Maker(s) consistent with Legal Next of Kin hierarchy. Content/Action Overview:   Has ACP document(s) on file - reflects the patient's care preferences  Reviewed DNR/DNI and patient confirms current DNR status - completed forms on file (place new order if needed)      Length of Voluntary ACP Conversation in minutes:  <16 minutes (Non-Billable)    Paul Brush RN

## 2022-06-07 NOTE — CONSULTS
[facility]  Initial Inpatient Consultation  Sita Lopez. Benito Storey MD        Patient Name: Randee Aguilar MRN: [id number]   Age: [de-identified] y.o. YOB: 1940   Sex: female        Reason for Consult: Right hip fracture    Requesting Physician: Angela Montoya MD    Date of Service: 6/8/2022 7:19 AM    CHIEF COMPLAINT     Chief Complaint   Patient presents with    Hip Pain     sent from [facility] for c/o right hip pain. xray confirmed fracture        HISTORY OF PRESENT ILLNESS   Randee Aguilar is a [de-identified] y.o. female who presents with the chief complaint as noted above. Patient with 2 to 3-week history of right hip pain. No known injury. Sent from [facility] to the emergency department at [facility] where x-rays confirmed right femoral neck fracture.     History Obtained From:  patient, electronic medical record    PAST MEDICAL HISTORY      Past Medical History:   Diagnosis Date    Carcinomatosis ([address]     CKD (chronic kidney disease) stage 3, GFR 30-59 ml/min ([address] 9/17/2019    Gastroesophageal reflux disease without esophagitis 7/20/2021    Hypertension     Hypothyroidism     Mixed hyperlipidemia     Obesity     Thyrotoxicosis     Type 2 diabetes mellitus without complication ([address]     Type II or unspecified type diabetes mellitus without mention of complication, not stated as uncontrolled        PAST SURGICAL HISTORY     Past Surgical History:   Procedure Laterality Date    ANKLE FRACTURE SURGERY Right 2007    CARPAL TUNNEL RELEASE Left 1998    CATARACT REMOVAL WITH IMPLANT Right 03/01/2018     PHACO EMULSIFICATION OF CATARACT WITH INTRA OCULAR LENS IMPLANT RIGHT EYE    CATARACT REMOVAL WITH IMPLANT Left 06/14/2018    COLOSTOMY N/A 12/31/2021    EXPLORATORY LAPAROTOMY, DIVERTING LOOP COLOSTOMY, PERITONEAL BIOPSY performed by Priscila Day MD at [facility] ([address] ARTHROSCOPY Right 2015    PORT SURGERY Left 01/31/2022    [facility] Port placement    PORT SURGERY N/A 1/31/2022    PORT INSERTION performed by Luz Gallardo MD at [facility]     Prior to Admission medications    Medication Sig Start Date End Date Taking? Authorizing Provider   Alpha-Lipoic Acid 600 MG CAPS Take 600 mg by mouth daily Takes once in am   Yes Historical Provider, MD   vitamin B-6 (PYRIDOXINE) 100 MG tablet Take 100 mg by mouth daily   Yes Historical Provider, MD   potassium chloride (KLOR-CON M) 10 MEQ extended release tablet Take 2 tablets by mouth daily for 3 days and then take 1 tablet by mouth daily 5/19/22   PILY Dela Cruz CNP   LANTUS SOLOSTAR 100 UNIT/ML injection pen INJECT 10 UNITS INTO THE SKIN NIGHTLY 5/2/22   PILY Dela Cruz CNP   omeprazole (PRILOSEC) 40 MG delayed release capsule TAKE 1 CAPSULE BY MOUTH EVERY MORNING (BEFORE BREAKFAST) 5/2/22   PILY Dela Cruz CNP   glipiZIDE (GLUCOTROL XL) 5 MG extended release tablet TAKE 1 TABLET BY MOUTH DAILY 3/14/22   PILY Dela Cruz CNP   levothyroxine (SYNTHROID) 100 MCG tablet TAKE 1 TABLET BY MOUTH DAILY 2/7/22   PILY Dela Cruz CNP   Nutritional Supplements (ENSURE CLEAR) LIQD Drink one clear ensure daily 1/12/22   PILY Dela Cruz CNP   hydroCHLOROthiazide (HYDRODIURIL) 25 MG tablet TAKE 1 TABLET BY MOUTH DAILY 7/28/21   PILY Dela Cruz CNP   Insulin Pen Needle ([employer] PEN NEEDLES 29G) 29G X 12MM MISC 1 each by Does not apply route daily 7/21/21   PILY Dela Cruz CNP   blood glucose test strips (ASCENSIA AUTODISC VI;ONE TOUCH ULTRA TEST VI) strip 1 each by In Vitro route daily As needed.  7/21/21   PILY Dela Cruz CNP       ALLERGIES     Allergies   Allergen Reactions    Food Color Pink Anaphylaxis    Shellfish Allergy Anaphylaxis     Heart scan    Atorvastatin      Other reaction(s): Weakness  Leg weakness    Ramipril      Other reaction(s): Cough    Metformin Nausea And Vomiting       FAMILY HISTORY     Family History   Problem Relation Age of Onset    Diabetes Sister     Heart Disease Sister     High Blood Pressure Sister     Diabetes Brother        SOCIAL HISTORY     Social History     Socioeconomic History    Marital status:      Spouse name: None    Number of children: None    Years of education: None    Highest education level: None   Occupational History    None   Tobacco Use    Smoking status: Never Smoker    Smokeless tobacco: Never Used   Vaping Use    Vaping Use: Never used   Substance and Sexual Activity    Alcohol use: No    Drug use: No    Sexual activity: Yes     Partners: Male   Other Topics Concern    None   Social History Narrative    None     Social Determinants of Health     Financial Resource Strain: Low Risk     Difficulty of Paying Living Expenses: Not hard at all   Food Insecurity: No Food Insecurity    Worried About Running Out of Food in the Last Year: Never true    Marlon of Food in the Last Year: Never true   Transportation Needs: No Transportation Needs    Lack of Transportation (Medical): No    Lack of Transportation (Non-Medical): No   Physical Activity: Inactive    Days of Exercise per Week: 0 days    Minutes of Exercise per Session: 0 min   Stress: No Stress Concern Present    Feeling of Stress : Only a little   Social Connections: Unknown    Frequency of Communication with Friends and Family: Three times a week    Frequency of Social Gatherings with Friends and Family:  Three times a week    Attends Alevism Services: Not on file    Active Member of Clubs or Organizations: Not on file    Attends Club or Organization Meetings: Not on file    Marital Status:    Intimate Partner Violence:     Fear of Current or Ex-Partner: Not on file    Emotionally Abused: Not on file    Physically Abused: Not on file    Sexually Abused: Not on file   Housing Stability: [address] [place] [address] Unable to Pay for Housing in the Last Year: No    Number of Places Lived in the Last Year: 1    Unstable Housing in the Last Year: No      reports that she has never smoked. She has never used smokeless tobacco.   reports no history of alcohol use. REVIEW OF SYSTEMS   See HPI for pertinent positives    PHYSICAL EXAM   Admission weight: 153 lb (69.4 kg)  5' 7\" (170.2 cm)  VITALS:  /64   Pulse 79   Temp 97.4 °F (36.3 °C) (Oral)   Resp 16   Ht 5' 7\" (1.702 m)   Wt 153 lb 12.8 oz (69.8 kg)   SpO2 94%   BMI 24.09 kg/m²     Right lower extremity without gross deformity. Pain with log roll ROM of hip. NVID. TEST RESULTS/RADIOLOGY     CBC:   Recent Labs     06/07/22  1513 06/08/22  0559   WBC 4.1 4.1   HGB 10.8* 11.2*    210     BMP:    Recent Labs     06/07/22  1513 06/07/22  2143 06/08/22  0559     --  142   K 2.9* 3.5 3.6     --  104   CO2 27  --  26   BUN 15  --  13   CREATININE 0.9  --  0.7   GLUCOSE 186*  --  99     INR:   Recent Labs     06/07/22  1513   INR 1.04       XR FEMUR RIGHT (MIN 2 VIEWS)   Final Result   Impacted right femoral neck fracture. No other acute right femur abnormality. XR TIBIA FIBULA RIGHT (2 VIEWS)   Final Result   No acute osseous abnormality of the right tib-fib. Osteopenia. XR KNEE RIGHT (1-2 VIEWS)    (Results Pending)      X-ray AP pelvis 2 views of the right hip show displaced subcapital femoral neck fracture likely subacute    2v full length femur xray shows no other abnormality. IMPRESSION     Right femoral neck fracture    PLAN     Recommend right hip bipolar hemiarthroplasty in order to restore ambulatory potential and prevent the complications of long-term bedrest.  Plan to proceed at 7:30 AM tomorrow assuming appropriate medical evaluation and clearance for surgery obtained this evening.         Eleonora Oliveira MD

## 2022-06-07 NOTE — PLAN OF CARE
[de-identified] y/o female with pmhx of colon cancer, CKD, GERD, HTN, hypothyroidism, HLD, Dm type 2 presented from [facility] for concern of pathological hip fracture     -Labs remarkable for potassium 2.9, magnesium 1.3, blood glucose 186  -macrocytic anemia  -right hip xray with impacted right femoral neck fracture   -orthopedic surgery consulted planning for surgery tomorrow   -NPO after midnight   -potassium and magnesium replacement given in ED will repeat labs tonight and tomorrow AM     Admit to ALICIA Fragoso  06/07/22  5:18 PM

## 2022-06-07 NOTE — H&P
[facility] Medicine History & Physical      PCP: Sherrie Gibbons, APRN - CNP    Date of Admission: 6/7/2022    Date of Service: Pt seen/examined on 6/7/2022     Chief Complaint:    Chief Complaint   Patient presents with    Hip Pain     sent from [facility] for c/o right hip pain. xray confirmed fracture          History Of Present Illness: The patient is a [de-identified] y.o. female with pmhx of colon cancer, CKD, HTN, hypothyroidism, GERD, HLD, DM type 2 who presented to [facility] ED from [facility] for concern for right hip pain, xray there confirmed hip fracture. Patient reports that over the past couple of weeks since she started chemotherapy she has noticed some bilateral lower extremity edema and bilateral hip pain. The swelling improved with HCTZ but she still had some in her right hip and ankle. A Couple days ago she then developed some difficulty sitting down on the toilet and extending her right leg, reports she had increased pain with this. Did not take anything for the pain. She was still able to ambulate. She uses a walker at home, She went to [facility] for oncology apt where they conducted a right hip xray and found that the hip was fractured and sent her to the ED. Patient is now also complaining of some right knee pain. She denies any recent injury or trauma. No falls. No fever, chills, chest pain, dyspnea, nausea, vomiting, diarrhea, syncope, dizziness.        Past Medical History:        Diagnosis Date    Carcinomatosis ([address]     CKD (chronic kidney disease) stage 3, GFR 30-59 ml/min ([facility]) 9/17/2019    Gastroesophageal reflux disease without esophagitis 7/20/2021    Hypertension     Hypothyroidism     Mixed hyperlipidemia     Obesity     Thyrotoxicosis     Type 2 diabetes mellitus without complication ([facility])     Type II or unspecified type diabetes mellitus without mention of complication, not stated as uncontrolled        Past Surgical History:        Procedure Laterality Date    ANKLE FRACTURE SURGERY Right 2007    CARPAL TUNNEL RELEASE Left 1998    CATARACT EXTRACTION W/  INTRAOCULAR LENS IMPLANT Right 03/01/2018     PHACO EMULSIFICATION OF CATARACT WITH INTRA OCULAR LENS IMPLANT RIGHT EYE    CATARACT EXTRACTION W/  INTRAOCULAR LENS IMPLANT Left 06/14/2018    COLOSTOMY N/A 12/31/2021    EXPLORATORY LAPAROTOMY, DIVERTING LOOP COLOSTOMY, PERITONEAL BIOPSY performed by Christopher Nguyễn MD at [facility] ARTHROSCOPY Right 2015    PORT SURGERY Left 01/31/2022    [facility] Port placement    PORT SURGERY N/A 1/31/2022    PORT INSERTION performed by John Del Rio MD at [facility] OR       Medications Prior to Admission:    Prior to Admission medications    Medication Sig Start Date End Date Taking?  Authorizing Provider   potassium chloride (KLOR-CON M) 10 MEQ extended release tablet Take 2 tablets by mouth daily for 3 days and then take 1 tablet by mouth daily 5/19/22   PILY Rodrigez CNP   LANTUS SOLOSTAR 100 UNIT/ML injection pen INJECT 10 UNITS INTO THE SKIN NIGHTLY 5/2/22   PILY Rodrigez CNP   omeprazole (PRILOSEC) 40 MG delayed release capsule TAKE 1 CAPSULE BY MOUTH EVERY MORNING (BEFORE BREAKFAST) 5/2/22   PILY Rodrigez CNP   glipiZIDE (GLUCOTROL XL) 5 MG extended release tablet TAKE 1 TABLET BY MOUTH DAILY 3/14/22   PILY Rodrigez CNP   levothyroxine (SYNTHROID) 100 MCG tablet TAKE 1 TABLET BY MOUTH DAILY 2/7/22   PILY Rodrigez CNP   Nutritional Supplements (ENSURE CLEAR) LIQD Drink one clear ensure daily 1/12/22   PILY Rodrigez CNP   hydroCHLOROthiazide (HYDRODIURIL) 25 MG tablet TAKE 1 TABLET BY MOUTH DAILY 7/28/21   PILY Rodrigez CNP   Insulin Pen Needle ([employer] PEN NEEDLES 29G) 29G X 12MM MISC 1 each by Does not apply route daily 7/21/21   PILY Rodrigez CNP   blood glucose test strips (ASCENSIA AUTODISC VI;ONE TOUCH ULTRA TEST VI) strip 1 each by In Vitro route daily As needed. 7/21/21   PILY Dimas CNP       Allergies:  Food color pink, Shellfish allergy, Atorvastatin, Ramipril, and Metformin    Social History:  The patient currently lives at home with her son    TOBACCO:   reports that she has never smoked. She has never used smokeless tobacco.  ETOH:   reports no history of alcohol use. Family History:   Positive as follows:        Problem Relation Age of Onset    Diabetes Sister     Heart Disease Sister     High Blood Pressure Sister     Diabetes Brother        REVIEW OF SYSTEMS:       Constitutional: Negative for fever   HENT: Negative for sore throat   Eyes: Negative for redness   Respiratory: Negative  for dyspnea, cough   Cardiovascular: Negative for chest pain   Gastrointestinal: Negative for vomiting, diarrhea   Genitourinary: Negative for hematuria   Musculoskeletal: + arthralgias    Skin: Negative for rash   Neurological: Negative for syncope   Hematological: Negative for adenopathy   Psychiatric/Behavorial: Negative for anxiety    PHYSICAL EXAM:    /68   Pulse 81   Temp 97.3 °F (36.3 °C) (Oral)   Resp 16   Ht 5' 7\" (1.702 m)   Wt 153 lb (69.4 kg)   SpO2 98%   BMI 23.96 kg/m²      Gen: No distress. Alert. Pleasant elderly female   Eyes: PERRL. No sclera icterus. No conjunctival injection. Neck: No JVD. No Carotid Bruit. Trachea midline. Resp: No accessory muscle use. No crackles. No wheezes. No rhonchi. CV: Regular rate. Regular rhythm. No murmur. No rub. No edema. Peripheral Pulses: +2 palpable, equal bilaterally   GI: Non-tender. Non-distended. No masses. No organomegaly. Normal bowel sounds. No hernia. Skin: Warm and dry. No nodule on exposed extremities. No rash on exposed extremities. No erythema, deformity, ecchymosis, or open wounds of bilateral lower extremities. M/S: No cyanosis. No joint deformity. No clubbing. + sensation intact bilaterally.  Active ROM of right lower extremity limited 2/2 to pain, left lower extremity ROM appropriate. + sensation and pulses intact bilaterally. Some scatted ecchymosis on lower extremities. +Patellar tenderness of right knee, no edema or erythema   Neuro: Awake. Grossly nonfocal     Psych: Oriented x 3. No anxiety or agitation. Lab Results   Component Value Date    WBC 4.1 06/07/2022    HGB 10.8 (L) 06/07/2022    HCT 32.1 (L) 06/07/2022    .0 (H) 06/07/2022     06/07/2022     Lab Results   Component Value Date     06/07/2022    K 2.9 (LL) 06/07/2022     06/07/2022    CO2 27 06/07/2022    BUN 15 06/07/2022    CREATININE 0.9 06/07/2022    GLUCOSE 186 (H) 06/07/2022    CALCIUM 9.2 06/07/2022    PROT 6.7 06/07/2022    LABALBU 3.7 06/07/2022    BILITOT 0.8 06/07/2022    ALKPHOS 120 06/07/2022    AST 37 06/07/2022    ALT 17 06/07/2022    LABGLOM >60 06/07/2022    GFRAA >60 06/07/2022    AGRATIO 1.2 06/07/2022    GLOB 3.3 07/20/2021         U/A:    Lab Results   Component Value Date    NITRITE neg 07/20/2021    SPECGRAV 1.020 07/20/2021    LEUKOCYTESUR neg 07/20/2021    BLOODU moderate 07/20/2021    GLUCOSEU >=1000 mg 07/20/2021         CULTURES  COVID and Flu not detected     EKG:  EKG still pending     RADIOLOGY  XR FEMUR RIGHT (MIN 2 VIEWS)   Final Result   Impacted right femoral neck fracture. No other acute right femur abnormality. XR TIBIA FIBULA RIGHT (2 VIEWS)   Final Result   No acute osseous abnormality of the right tib-fib. Osteopenia.          XR KNEE RIGHT (1-2 VIEWS)    (Results Pending)         ASSESSMENT/PLAN:    #Impacted closed right femoral neck fracture   -hx of osteopenia   -possible pathological fracture  -NPO after midnight   -tylenol prn for pain   -PT/OT consulted   -orthopedic surgery consulted, planning for surgery tomorrow AM  -[facility] requesting bone biopsy, Jasmyn Dixon made aware of this     #Right knee pain   -Xray pending     #Hypokalemia  #Hypomagnesemia  -replaced   -repeat later today     #Chronic macrocytic anemia  -Hgb stable   -continue to monitor   -B12 and folate pending     #Colon cancer  -on chemotherapy  -- S/p Exploratory laparotomy with diverting loop sigmoid colostomy  and peritoneal biopsy on 12/31. -follows with oncology outpatient    #HTN   -continue HCTZ       #DM type 2  -continue lantus   -SSI   -continue to monitor     #CKD   -Cr stable   -continue to monitor     #Hypothyroidism   -continue synthroid     #GERD   #Hiatal hernia   -continue PPI     DVT Prophylaxis: lovenox   Diet: Diet NPO  ADULT DIET;  Regular; 4 carb choices (60 gm/meal)  Code Status: Full Code    ALICIA Degroot  06/07/22  9:16 PM

## 2022-06-07 NOTE — CARE COORDINATION
Case Management Assessment  Initial Evaluation      Patient Name: Aristides Lehman  YOB: 1940  Diagnosis: Closed right hip fracture, initial encounter [facility] Charli Mon  Date / Time: 6/7/2022  2:55 PM    Admission status/Date: Inpatient 6/07/2022  Chart Reviewed: Yes      Patient Interviewed: Yes   Family Interviewed:  No      Hospitalization in the last 30 days:  No      Health Care Decision Maker :   Primary Decision Maker: Pino Colin - Carie - [phone]   Who do you trust or have selected to make healthcare decisions for you    Met with: Patient at bedside. Current PCP: PILY Inman CNP    Financial  Medicare  Precert required for SNF : N          3 night stay required - N    ADLS  Support Systems/Care Needs:    Transportation: self    Meal Preparation: self    Housing  Living Arrangements: Lives in home with disabled son Steps: [address] for return to present living arrangements: No  Identified Issues: Will need SNF placement for rehabl    Home Care Information  Active with Home Health Care : Yes  Agency: [facility]  SN visit 1x every 2 weeks to de-access port     Passport/Waiver : No  :                      Phone Number:    Passport/Waiver Services: n/a           Durable Medical Equiptment   DME Provider: n/a   Equipment:   Walker_x__Cane_x__RTS___ BSC_x__Shower Chair_x__Hospital Bed___W/C__x__Other________  02 at ____Liter(s)---wears(frequency)_______ HHN ___ CPAP___ BiPap___   N/A____      Home O2 Use :  No    If No for home O2---if presently on O2 during hospitalization:  No  if yes CM to follow for potential DC O2 need  Informed of need for care provider to bring portable home O2 tank on day of discharge for nursing to connect prior to leaving:   Not Indicated  Verbalized agreement/Understanding:   Not Indicated    Community Service Affiliation  Dialysis:  No    · Agency:  · Location:  · Dialysis Schedule:  · Phone:   · Fax:     Other Community Services: [facility] for chemo tx's every 2 weeks (due today)    DISCHARGE PLAN: Explained Case Management role/services. CM reviewed chart and met with patient at bedside to discuss discharge needs and plan. Patient primary caregiver to disabled son. IPTA and active . To ED for right femoral neck fracture. CM discussed SNF's and patient requests referral to [facility] [address] for rehab. CM spoke to [place] at [facility] [address] ([phone]) who states she will review chart and return CM call. CM received return call from [place] who states [facility] able to accept patient at discharge. Will need negative Covid within 48 hours of discharge.     Luana Hermosillo RN

## 2022-06-07 NOTE — ED NOTES
Removed ambulance IV from the left [facility], note catheter was kinked.      Maik Antoine RN  06/07/22 [id number]

## 2022-06-07 NOTE — PROGRESS NOTES
Patient with 2-3 wk history of right hip pain. Plain films reveal femoral neck fracture. Has history of colon ca, diabetes, CKD. No known anticoagulation. Will plan for surgery tomorrow morning at 7:30am.  Keep NPO after MN. Appreciate IM evaluation and clearance prior to surgery tomorrow morning.     Ellie Hashimoto, PA-C

## 2022-06-08 ENCOUNTER — ANESTHESIA (OUTPATIENT)
Dept: OPERATING ROOM | Age: 82
DRG: 522 | End: 2022-06-08
Payer: MEDICARE

## 2022-06-08 ENCOUNTER — APPOINTMENT (OUTPATIENT)
Dept: GENERAL RADIOLOGY | Age: 82
DRG: 522 | End: 2022-06-08
Payer: MEDICARE

## 2022-06-08 LAB
ANION GAP SERPL CALCULATED.3IONS-SCNC: 12 MMOL/L (ref 3–16)
BASOPHILS ABSOLUTE: 0 K/UL (ref 0–0.2)
BASOPHILS RELATIVE PERCENT: 0.7 %
BUN BLDV-MCNC: 13 MG/DL (ref 7–20)
CALCIUM SERPL-MCNC: 9.5 MG/DL (ref 8.3–10.6)
CHLORIDE BLD-SCNC: 104 MMOL/L (ref 99–110)
CO2: 26 MMOL/L (ref 21–32)
CREAT SERPL-MCNC: 0.7 MG/DL (ref 0.6–1.2)
EKG ATRIAL RATE: 81 BPM
EKG DIAGNOSIS: NORMAL
EKG P AXIS: -2 DEGREES
EKG P-R INTERVAL: 156 MS
EKG Q-T INTERVAL: 410 MS
EKG QRS DURATION: 92 MS
EKG QTC CALCULATION (BAZETT): 476 MS
EKG R AXIS: 28 DEGREES
EKG T AXIS: 67 DEGREES
EKG VENTRICULAR RATE: 81 BPM
EOSINOPHILS ABSOLUTE: 0.2 K/UL (ref 0–0.6)
EOSINOPHILS RELATIVE PERCENT: 5.8 %
ESTIMATED AVERAGE GLUCOSE: 154.2 MG/DL
FOLATE: >20 NG/ML (ref 4.78–24.2)
GFR AFRICAN AMERICAN: >60
GFR NON-AFRICAN AMERICAN: >60
GLUCOSE BLD-MCNC: 153 MG/DL (ref 70–99)
GLUCOSE BLD-MCNC: 165 MG/DL (ref 70–99)
GLUCOSE BLD-MCNC: 84 MG/DL (ref 70–99)
GLUCOSE BLD-MCNC: 87 MG/DL (ref 70–99)
GLUCOSE BLD-MCNC: 99 MG/DL (ref 70–99)
HBA1C MFR BLD: 7 %
HCT VFR BLD CALC: 32.6 % (ref 36–48)
HEMOGLOBIN: 11.2 G/DL (ref 12–16)
LYMPHOCYTES ABSOLUTE: 1.1 K/UL (ref 1–5.1)
LYMPHOCYTES RELATIVE PERCENT: 27.3 %
MCH RBC QN AUTO: 36.8 PG (ref 26–34)
MCHC RBC AUTO-ENTMCNC: 34.4 G/DL (ref 31–36)
MCV RBC AUTO: 107 FL (ref 80–100)
MONOCYTES ABSOLUTE: 0.6 K/UL (ref 0–1.3)
MONOCYTES RELATIVE PERCENT: 15.8 %
NEUTROPHILS ABSOLUTE: 2.1 K/UL (ref 1.7–7.7)
NEUTROPHILS RELATIVE PERCENT: 50.4 %
PDW BLD-RTO: 15.3 % (ref 12.4–15.4)
PERFORMED ON: ABNORMAL
PERFORMED ON: ABNORMAL
PERFORMED ON: NORMAL
PERFORMED ON: NORMAL
PLATELET # BLD: 210 K/UL (ref 135–450)
PMV BLD AUTO: 7 FL (ref 5–10.5)
POTASSIUM REFLEX MAGNESIUM: 3.6 MMOL/L (ref 3.5–5.1)
RBC # BLD: 3.05 M/UL (ref 4–5.2)
SODIUM BLD-SCNC: 142 MMOL/L (ref 136–145)
VITAMIN B-12: 355 PG/ML (ref 211–911)
WBC # BLD: 4.1 K/UL (ref 4–11)

## 2022-06-08 PROCEDURE — 72170 X-RAY EXAM OF PELVIS: CPT

## 2022-06-08 PROCEDURE — 1200000000 HC SEMI PRIVATE

## 2022-06-08 PROCEDURE — 2500000003 HC RX 250 WO HCPCS

## 2022-06-08 PROCEDURE — 97535 SELF CARE MNGMENT TRAINING: CPT

## 2022-06-08 PROCEDURE — 2709999900 HC NON-CHARGEABLE SUPPLY: Performed by: ORTHOPAEDIC SURGERY

## 2022-06-08 PROCEDURE — 2580000003 HC RX 258: Performed by: ANESTHESIOLOGY

## 2022-06-08 PROCEDURE — 3E0T3BZ INTRODUCTION OF ANESTHETIC AGENT INTO PERIPHERAL NERVES AND PLEXI, PERCUTANEOUS APPROACH: ICD-10-PCS | Performed by: ANESTHESIOLOGY

## 2022-06-08 PROCEDURE — 88342 IMHCHEM/IMCYTCHM 1ST ANTB: CPT

## 2022-06-08 PROCEDURE — 27236 TREAT THIGH FRACTURE: CPT | Performed by: ORTHOPAEDIC SURGERY

## 2022-06-08 PROCEDURE — 2580000003 HC RX 258

## 2022-06-08 PROCEDURE — 97116 GAIT TRAINING THERAPY: CPT

## 2022-06-08 PROCEDURE — 0SRR0JA REPLACEMENT OF RIGHT HIP JOINT, FEMORAL SURFACE WITH SYNTHETIC SUBSTITUTE, UNCEMENTED, OPEN APPROACH: ICD-10-PCS | Performed by: ORTHOPAEDIC SURGERY

## 2022-06-08 PROCEDURE — 2580000003 HC RX 258: Performed by: ORTHOPAEDIC SURGERY

## 2022-06-08 PROCEDURE — 3600000015 HC SURGERY LEVEL 5 ADDTL 15MIN: Performed by: ORTHOPAEDIC SURGERY

## 2022-06-08 PROCEDURE — 7100000001 HC PACU RECOVERY - ADDTL 15 MIN: Performed by: ORTHOPAEDIC SURGERY

## 2022-06-08 PROCEDURE — 2500000003 HC RX 250 WO HCPCS: Performed by: ANESTHESIOLOGY

## 2022-06-08 PROCEDURE — 88311 DECALCIFY TISSUE: CPT

## 2022-06-08 PROCEDURE — 6370000000 HC RX 637 (ALT 250 FOR IP): Performed by: ORTHOPAEDIC SURGERY

## 2022-06-08 PROCEDURE — A4217 STERILE WATER/SALINE, 500 ML: HCPCS | Performed by: ORTHOPAEDIC SURGERY

## 2022-06-08 PROCEDURE — 3700000001 HC ADD 15 MINUTES (ANESTHESIA): Performed by: ORTHOPAEDIC SURGERY

## 2022-06-08 PROCEDURE — 6360000002 HC RX W HCPCS: Performed by: ORTHOPAEDIC SURGERY

## 2022-06-08 PROCEDURE — 88305 TISSUE EXAM BY PATHOLOGIST: CPT

## 2022-06-08 PROCEDURE — 2720000010 HC SURG SUPPLY STERILE: Performed by: ORTHOPAEDIC SURGERY

## 2022-06-08 PROCEDURE — 99232 SBSQ HOSP IP/OBS MODERATE 35: CPT | Performed by: INTERNAL MEDICINE

## 2022-06-08 PROCEDURE — 97530 THERAPEUTIC ACTIVITIES: CPT

## 2022-06-08 PROCEDURE — 3700000000 HC ANESTHESIA ATTENDED CARE: Performed by: ORTHOPAEDIC SURGERY

## 2022-06-08 PROCEDURE — 97161 PT EVAL LOW COMPLEX 20 MIN: CPT

## 2022-06-08 PROCEDURE — 6360000002 HC RX W HCPCS

## 2022-06-08 PROCEDURE — 36415 COLL VENOUS BLD VENIPUNCTURE: CPT

## 2022-06-08 PROCEDURE — 76942 ECHO GUIDE FOR BIOPSY: CPT | Performed by: ANESTHESIOLOGY

## 2022-06-08 PROCEDURE — C1776 JOINT DEVICE (IMPLANTABLE): HCPCS | Performed by: ORTHOPAEDIC SURGERY

## 2022-06-08 PROCEDURE — 3600000005 HC SURGERY LEVEL 5 BASE: Performed by: ORTHOPAEDIC SURGERY

## 2022-06-08 PROCEDURE — 94150 VITAL CAPACITY TEST: CPT

## 2022-06-08 PROCEDURE — 85025 COMPLETE CBC W/AUTO DIFF WBC: CPT

## 2022-06-08 PROCEDURE — 97166 OT EVAL MOD COMPLEX 45 MIN: CPT

## 2022-06-08 PROCEDURE — 7100000000 HC PACU RECOVERY - FIRST 15 MIN: Performed by: ORTHOPAEDIC SURGERY

## 2022-06-08 PROCEDURE — 6370000000 HC RX 637 (ALT 250 FOR IP)

## 2022-06-08 PROCEDURE — 93010 ELECTROCARDIOGRAM REPORT: CPT | Performed by: INTERNAL MEDICINE

## 2022-06-08 PROCEDURE — 80048 BASIC METABOLIC PNL TOTAL CA: CPT

## 2022-06-08 DEVICE — STEM FEM UNIV 0+ 28 MM HIP COCR: Type: IMPLANTABLE DEVICE | Site: HIP | Status: FUNCTIONAL

## 2022-06-08 DEVICE — HIP H4 HEMI UNIV BIPLR IMPL CAPPED H4: Type: IMPLANTABLE DEVICE | Site: HIP | Status: FUNCTIONAL

## 2022-06-08 DEVICE — IMPLANTABLE DEVICE: Type: IMPLANTABLE DEVICE | Site: HIP | Status: FUNCTIONAL

## 2022-06-08 RX ORDER — DEXAMETHASONE SODIUM PHOSPHATE 4 MG/ML
INJECTION, SOLUTION INTRA-ARTICULAR; INTRALESIONAL; INTRAMUSCULAR; INTRAVENOUS; SOFT TISSUE PRN
Status: DISCONTINUED | OUTPATIENT
Start: 2022-06-08 | End: 2022-06-08 | Stop reason: SDUPTHER

## 2022-06-08 RX ORDER — SODIUM CHLORIDE, SODIUM LACTATE, POTASSIUM CHLORIDE, CALCIUM CHLORIDE 600; 310; 30; 20 MG/100ML; MG/100ML; MG/100ML; MG/100ML
INJECTION, SOLUTION INTRAVENOUS CONTINUOUS
Status: DISCONTINUED | OUTPATIENT
Start: 2022-06-08 | End: 2022-06-09

## 2022-06-08 RX ORDER — SODIUM CHLORIDE 9 MG/ML
INJECTION, SOLUTION INTRAVENOUS PRN
Status: DISCONTINUED | OUTPATIENT
Start: 2022-06-08 | End: 2022-06-08 | Stop reason: SDUPTHER

## 2022-06-08 RX ORDER — SODIUM CHLORIDE 9 MG/ML
INJECTION, SOLUTION INTRAVENOUS CONTINUOUS PRN
Status: DISCONTINUED | OUTPATIENT
Start: 2022-06-08 | End: 2022-06-08 | Stop reason: SDUPTHER

## 2022-06-08 RX ORDER — LABETALOL HYDROCHLORIDE 5 MG/ML
10 INJECTION, SOLUTION INTRAVENOUS
Status: DISCONTINUED | OUTPATIENT
Start: 2022-06-08 | End: 2022-06-08 | Stop reason: HOSPADM

## 2022-06-08 RX ORDER — SODIUM CHLORIDE 0.9 % (FLUSH) 0.9 %
5-40 SYRINGE (ML) INJECTION EVERY 12 HOURS SCHEDULED
Status: DISCONTINUED | OUTPATIENT
Start: 2022-06-08 | End: 2022-06-10 | Stop reason: HOSPADM

## 2022-06-08 RX ORDER — SODIUM CHLORIDE, SODIUM LACTATE, POTASSIUM CHLORIDE, CALCIUM CHLORIDE 600; 310; 30; 20 MG/100ML; MG/100ML; MG/100ML; MG/100ML
INJECTION, SOLUTION INTRAVENOUS CONTINUOUS PRN
Status: DISCONTINUED | OUTPATIENT
Start: 2022-06-08 | End: 2022-06-08 | Stop reason: SDUPTHER

## 2022-06-08 RX ORDER — SODIUM CHLORIDE 0.9 % (FLUSH) 0.9 %
5-40 SYRINGE (ML) INJECTION EVERY 12 HOURS SCHEDULED
Status: DISCONTINUED | OUTPATIENT
Start: 2022-06-08 | End: 2022-06-08 | Stop reason: SDUPTHER

## 2022-06-08 RX ORDER — ONDANSETRON 2 MG/ML
4 INJECTION INTRAMUSCULAR; INTRAVENOUS
Status: DISCONTINUED | OUTPATIENT
Start: 2022-06-08 | End: 2022-06-08 | Stop reason: HOSPADM

## 2022-06-08 RX ORDER — ONDANSETRON 2 MG/ML
INJECTION INTRAMUSCULAR; INTRAVENOUS PRN
Status: DISCONTINUED | OUTPATIENT
Start: 2022-06-08 | End: 2022-06-08 | Stop reason: SDUPTHER

## 2022-06-08 RX ORDER — OXYCODONE HYDROCHLORIDE 5 MG/1
5 TABLET ORAL PRN
Status: DISCONTINUED | OUTPATIENT
Start: 2022-06-08 | End: 2022-06-08 | Stop reason: HOSPADM

## 2022-06-08 RX ORDER — POTASSIUM CHLORIDE 750 MG/1
10 TABLET, EXTENDED RELEASE ORAL DAILY
Status: DISCONTINUED | OUTPATIENT
Start: 2022-06-09 | End: 2022-06-10 | Stop reason: HOSPADM

## 2022-06-08 RX ORDER — ENOXAPARIN SODIUM 100 MG/ML
40 INJECTION SUBCUTANEOUS DAILY
Status: DISCONTINUED | OUTPATIENT
Start: 2022-06-09 | End: 2022-06-10 | Stop reason: HOSPADM

## 2022-06-08 RX ORDER — OXYCODONE HYDROCHLORIDE 5 MG/1
10 TABLET ORAL EVERY 4 HOURS PRN
Status: DISCONTINUED | OUTPATIENT
Start: 2022-06-08 | End: 2022-06-10 | Stop reason: HOSPADM

## 2022-06-08 RX ORDER — ROCURONIUM BROMIDE 10 MG/ML
INJECTION, SOLUTION INTRAVENOUS PRN
Status: DISCONTINUED | OUTPATIENT
Start: 2022-06-08 | End: 2022-06-08 | Stop reason: SDUPTHER

## 2022-06-08 RX ORDER — LIDOCAINE HYDROCHLORIDE 10 MG/ML
0.3 INJECTION, SOLUTION EPIDURAL; INFILTRATION; INTRACAUDAL; PERINEURAL
Status: DISCONTINUED | OUTPATIENT
Start: 2022-06-08 | End: 2022-06-08 | Stop reason: HOSPADM

## 2022-06-08 RX ORDER — SODIUM CHLORIDE 9 MG/ML
25 INJECTION, SOLUTION INTRAVENOUS PRN
Status: DISCONTINUED | OUTPATIENT
Start: 2022-06-08 | End: 2022-06-08 | Stop reason: SDUPTHER

## 2022-06-08 RX ORDER — SODIUM CHLORIDE 0.9 % (FLUSH) 0.9 %
5-40 SYRINGE (ML) INJECTION PRN
Status: DISCONTINUED | OUTPATIENT
Start: 2022-06-08 | End: 2022-06-10 | Stop reason: HOSPADM

## 2022-06-08 RX ORDER — LIDOCAINE HYDROCHLORIDE 20 MG/ML
INJECTION, SOLUTION EPIDURAL; INFILTRATION; INTRACAUDAL; PERINEURAL PRN
Status: DISCONTINUED | OUTPATIENT
Start: 2022-06-08 | End: 2022-06-08 | Stop reason: SDUPTHER

## 2022-06-08 RX ORDER — FENTANYL CITRATE 50 UG/ML
INJECTION, SOLUTION INTRAMUSCULAR; INTRAVENOUS PRN
Status: DISCONTINUED | OUTPATIENT
Start: 2022-06-08 | End: 2022-06-08 | Stop reason: SDUPTHER

## 2022-06-08 RX ORDER — PROPOFOL 10 MG/ML
INJECTION, EMULSION INTRAVENOUS PRN
Status: DISCONTINUED | OUTPATIENT
Start: 2022-06-08 | End: 2022-06-08 | Stop reason: SDUPTHER

## 2022-06-08 RX ORDER — OXYCODONE HYDROCHLORIDE 5 MG/1
5 TABLET ORAL EVERY 4 HOURS PRN
Status: DISCONTINUED | OUTPATIENT
Start: 2022-06-08 | End: 2022-06-10 | Stop reason: HOSPADM

## 2022-06-08 RX ORDER — BUPIVACAINE HYDROCHLORIDE 2.5 MG/ML
INJECTION, SOLUTION EPIDURAL; INFILTRATION; INTRACAUDAL PRN
Status: DISCONTINUED | OUTPATIENT
Start: 2022-06-08 | End: 2022-06-08 | Stop reason: SDUPTHER

## 2022-06-08 RX ORDER — HYDROXYZINE HYDROCHLORIDE 10 MG/1
10 TABLET, FILM COATED ORAL EVERY 8 HOURS PRN
Status: DISCONTINUED | OUTPATIENT
Start: 2022-06-08 | End: 2022-06-10 | Stop reason: HOSPADM

## 2022-06-08 RX ORDER — SENNA AND DOCUSATE SODIUM 50; 8.6 MG/1; MG/1
1 TABLET, FILM COATED ORAL 2 TIMES DAILY
Status: DISCONTINUED | OUTPATIENT
Start: 2022-06-08 | End: 2022-06-10 | Stop reason: HOSPADM

## 2022-06-08 RX ORDER — SODIUM CHLORIDE 0.9 % (FLUSH) 0.9 %
5-40 SYRINGE (ML) INJECTION PRN
Status: DISCONTINUED | OUTPATIENT
Start: 2022-06-08 | End: 2022-06-08 | Stop reason: SDUPTHER

## 2022-06-08 RX ORDER — ACETAMINOPHEN 325 MG/1
650 TABLET ORAL EVERY 6 HOURS
Status: DISCONTINUED | OUTPATIENT
Start: 2022-06-08 | End: 2022-06-10 | Stop reason: HOSPADM

## 2022-06-08 RX ORDER — OXYCODONE HYDROCHLORIDE 5 MG/1
10 TABLET ORAL PRN
Status: DISCONTINUED | OUTPATIENT
Start: 2022-06-08 | End: 2022-06-08 | Stop reason: HOSPADM

## 2022-06-08 RX ORDER — SODIUM CHLORIDE 9 MG/ML
INJECTION, SOLUTION INTRAVENOUS PRN
Status: DISCONTINUED | OUTPATIENT
Start: 2022-06-08 | End: 2022-06-10 | Stop reason: HOSPADM

## 2022-06-08 RX ORDER — DIPHENHYDRAMINE HYDROCHLORIDE 50 MG/ML
12.5 INJECTION INTRAMUSCULAR; INTRAVENOUS
Status: DISCONTINUED | OUTPATIENT
Start: 2022-06-08 | End: 2022-06-08 | Stop reason: HOSPADM

## 2022-06-08 RX ORDER — MAGNESIUM HYDROXIDE 1200 MG/15ML
LIQUID ORAL CONTINUOUS PRN
Status: DISCONTINUED | OUTPATIENT
Start: 2022-06-08 | End: 2022-06-08 | Stop reason: HOSPADM

## 2022-06-08 RX ORDER — MEPERIDINE HYDROCHLORIDE 25 MG/ML
12.5 INJECTION INTRAMUSCULAR; INTRAVENOUS; SUBCUTANEOUS EVERY 5 MIN PRN
Status: DISCONTINUED | OUTPATIENT
Start: 2022-06-08 | End: 2022-06-08 | Stop reason: HOSPADM

## 2022-06-08 RX ADMIN — SENNOSIDES AND DOCUSATE SODIUM 1 TABLET: 50; 8.6 TABLET ORAL at 12:22

## 2022-06-08 RX ADMIN — ROCURONIUM BROMIDE 20 MG: 10 INJECTION, SOLUTION INTRAVENOUS at 08:15

## 2022-06-08 RX ADMIN — OXYCODONE 5 MG: 5 TABLET ORAL at 12:23

## 2022-06-08 RX ADMIN — SODIUM CHLORIDE, POTASSIUM CHLORIDE, SODIUM LACTATE AND CALCIUM CHLORIDE: 600; 310; 30; 20 INJECTION, SOLUTION INTRAVENOUS at 20:30

## 2022-06-08 RX ADMIN — ONDANSETRON HYDROCHLORIDE 4 MG: 2 INJECTION, SOLUTION INTRAMUSCULAR; INTRAVENOUS at 08:44

## 2022-06-08 RX ADMIN — SODIUM CHLORIDE, POTASSIUM CHLORIDE, SODIUM LACTATE AND CALCIUM CHLORIDE: 600; 310; 30; 20 INJECTION, SOLUTION INTRAVENOUS at 12:21

## 2022-06-08 RX ADMIN — ROCURONIUM BROMIDE 40 MG: 10 INJECTION, SOLUTION INTRAVENOUS at 07:41

## 2022-06-08 RX ADMIN — ACETAMINOPHEN 650 MG: 325 TABLET ORAL at 16:53

## 2022-06-08 RX ADMIN — PANTOPRAZOLE SODIUM 40 MG: 40 TABLET, DELAYED RELEASE ORAL at 05:14

## 2022-06-08 RX ADMIN — FENTANYL CITRATE 25 MCG: 50 INJECTION INTRAMUSCULAR; INTRAVENOUS at 07:50

## 2022-06-08 RX ADMIN — Medication 2000 MG: at 23:00

## 2022-06-08 RX ADMIN — LIDOCAINE HYDROCHLORIDE 40 MG: 20 INJECTION, SOLUTION EPIDURAL; INFILTRATION; INTRACAUDAL; PERINEURAL at 07:40

## 2022-06-08 RX ADMIN — LEVOTHYROXINE SODIUM 100 MCG: 100 TABLET ORAL at 05:15

## 2022-06-08 RX ADMIN — BUPIVACAINE HYDROCHLORIDE 30 ML: 2.5 INJECTION, SOLUTION EPIDURAL; INFILTRATION; INTRACAUDAL; PERINEURAL at 07:16

## 2022-06-08 RX ADMIN — ACETAMINOPHEN 650 MG: 325 TABLET ORAL at 22:57

## 2022-06-08 RX ADMIN — HYDROMORPHONE HYDROCHLORIDE 0.5 MG: 1 INJECTION, SOLUTION INTRAMUSCULAR; INTRAVENOUS; SUBCUTANEOUS at 10:30

## 2022-06-08 RX ADMIN — OXYCODONE 5 MG: 5 TABLET ORAL at 18:37

## 2022-06-08 RX ADMIN — PHENYLEPHRINE HYDROCHLORIDE 200 MCG: 10 INJECTION INTRAVENOUS at 08:43

## 2022-06-08 RX ADMIN — FENTANYL CITRATE 25 MCG: 50 INJECTION INTRAMUSCULAR; INTRAVENOUS at 08:12

## 2022-06-08 RX ADMIN — PHENYLEPHRINE HYDROCHLORIDE 100 MCG: 10 INJECTION INTRAVENOUS at 08:28

## 2022-06-08 RX ADMIN — PROPOFOL 100 MG: 10 INJECTION, EMULSION INTRAVENOUS at 07:40

## 2022-06-08 RX ADMIN — INSULIN LISPRO 1 UNITS: 100 INJECTION, SOLUTION INTRAVENOUS; SUBCUTANEOUS at 16:50

## 2022-06-08 RX ADMIN — ACETAMINOPHEN 650 MG: 325 TABLET ORAL at 05:14

## 2022-06-08 RX ADMIN — PHENYLEPHRINE HYDROCHLORIDE 100 MCG: 10 INJECTION INTRAVENOUS at 08:34

## 2022-06-08 RX ADMIN — SUGAMMADEX 200 MG: 100 INJECTION, SOLUTION INTRAVENOUS at 09:19

## 2022-06-08 RX ADMIN — SODIUM CHLORIDE, POTASSIUM CHLORIDE, SODIUM LACTATE AND CALCIUM CHLORIDE: 600; 310; 30; 20 INJECTION, SOLUTION INTRAVENOUS at 06:57

## 2022-06-08 RX ADMIN — PHENYLEPHRINE HYDROCHLORIDE 100 MCG: 10 INJECTION INTRAVENOUS at 08:21

## 2022-06-08 RX ADMIN — SODIUM CHLORIDE: 0.9 INJECTION, SOLUTION INTRAVENOUS at 08:57

## 2022-06-08 RX ADMIN — PHENYLEPHRINE HYDROCHLORIDE 100 MCG: 10 INJECTION INTRAVENOUS at 07:55

## 2022-06-08 RX ADMIN — DEXAMETHASONE SODIUM PHOSPHATE 4 MG: 4 INJECTION, SOLUTION INTRAMUSCULAR; INTRAVENOUS at 07:48

## 2022-06-08 RX ADMIN — ACETAMINOPHEN 650 MG: 325 TABLET ORAL at 12:23

## 2022-06-08 RX ADMIN — Medication 2000 MG: at 16:49

## 2022-06-08 RX ADMIN — FENTANYL CITRATE 50 MCG: 50 INJECTION INTRAMUSCULAR; INTRAVENOUS at 08:03

## 2022-06-08 RX ADMIN — INSULIN LISPRO 1 UNITS: 100 INJECTION, SOLUTION INTRAVENOUS; SUBCUTANEOUS at 12:27

## 2022-06-08 RX ADMIN — HYDROMORPHONE HYDROCHLORIDE 0.5 MG: 1 INJECTION, SOLUTION INTRAMUSCULAR; INTRAVENOUS; SUBCUTANEOUS at 10:10

## 2022-06-08 RX ADMIN — Medication 2000 MG: at 07:32

## 2022-06-08 ASSESSMENT — PAIN SCALES - GENERAL
PAINLEVEL_OUTOF10: 0
PAINLEVEL_OUTOF10: 3
PAINLEVEL_OUTOF10: 10
PAINLEVEL_OUTOF10: 5
PAINLEVEL_OUTOF10: 6
PAINLEVEL_OUTOF10: 3
PAINLEVEL_OUTOF10: 2
PAINLEVEL_OUTOF10: 5
PAINLEVEL_OUTOF10: 8

## 2022-06-08 ASSESSMENT — PAIN DESCRIPTION - LOCATION
LOCATION: HIP

## 2022-06-08 ASSESSMENT — PAIN - FUNCTIONAL ASSESSMENT
PAIN_FUNCTIONAL_ASSESSMENT: PREVENTS OR INTERFERES SOME ACTIVE ACTIVITIES AND ADLS
PAIN_FUNCTIONAL_ASSESSMENT: ACTIVITIES ARE NOT PREVENTED

## 2022-06-08 ASSESSMENT — PAIN DESCRIPTION - DESCRIPTORS
DESCRIPTORS: SORE;SQUEEZING
DESCRIPTORS: ACHING

## 2022-06-08 ASSESSMENT — PAIN DESCRIPTION - ONSET: ONSET: ON-GOING

## 2022-06-08 ASSESSMENT — PAIN DESCRIPTION - FREQUENCY: FREQUENCY: CONTINUOUS

## 2022-06-08 ASSESSMENT — PAIN DESCRIPTION - PAIN TYPE
TYPE: SURGICAL PAIN
TYPE: SURGICAL PAIN

## 2022-06-08 ASSESSMENT — PAIN DESCRIPTION - ORIENTATION
ORIENTATION: RIGHT
ORIENTATION: RIGHT

## 2022-06-08 NOTE — ED NOTES
Report given to 2 [facility] nurse at this time.       Jodee Gill RN  06/07/22 [id number]

## 2022-06-08 NOTE — PROGRESS NOTES
Progress Note    Admit Date:  6/7/2022    Right hip pain. Recent diagnosis of colon cancer and getting chemotherapy. X-ray was obtained at [facility] and noted with hip fracture and she was sent to the ED for evaluation. Denies any trauma to cause this. Patient is s/p total right hip arthroplasty today 6/8     Subjective:  Ms. Zion Candelario is sitting up on the chair. Pain is controlled. Worked with therapy    Objective:   Patient Vitals for the past 4 hrs:   BP Temp Temp src Pulse Resp SpO2   06/08/22 1411 (!) 142/80 -- -- 81 -- 97 %   06/08/22 1315 128/67 96.8 °F (36 °C) Oral 81 16 96 %   06/08/22 1213 121/67 (!) 96.7 °F (35.9 °C) Oral 76 16 99 %            Intake/Output Summary (Last 24 hours) at 6/8/2022 1530  Last data filed at 6/8/2022 1115  Gross per 24 hour   Intake 1300 ml   Output --   Net 1300 ml       Physical Exam:  Gen: No distress. Alert. Awake and well-oriented  Eyes: PERRL. No sclera icterus. No conjunctival injection. ENT: No discharge. Pharynx clear. Neck: No JVD. Trachea midline. Resp: No accessory muscle use. No crackles. No wheezes. No rhonchi. CV: Regular rate. Regular rhythm. No murmur. No rub. No edema. Capillary Refill: Brisk,< 3 seconds   Peripheral Pulses: +2 palpable, equal bilaterally   GI: Non-tender. Non-distended. Normal bowel sounds. Skin: Warm and dry. No nodule on exposed extremities. No rash on exposed extremities. M/S: No cyanosis. No joint deformity. No clubbing. Right hip dressing  Neuro: Awake. Grossly nonfocal    Psych: Oriented x 3. No anxiety or agitation.        Scheduled Meds:   [START ON 6/9/2022] potassium chloride  10 mEq Oral Daily    sodium chloride flush  5-40 mL IntraVENous 2 times per day    ceFAZolin (ANCEF) IVPB  2,000 mg IntraVENous Q8H    sennosides-docusate sodium  1 tablet Oral BID    [START ON 6/9/2022] enoxaparin  40 mg SubCUTAneous Daily    acetaminophen  650 mg Oral Q6H    hydroCHLOROthiazide  1 tablet Oral Daily    insulin glargine  10 Units SubCUTAneous Nightly    levothyroxine  100 mcg Oral Daily    pantoprazole  40 mg Oral QAM AC    insulin lispro  0-6 Units SubCUTAneous TID WC    insulin lispro  0-3 Units SubCUTAneous Nightly       Continuous Infusions:   lactated ringers 125 mL/hr at 06/08/22 1221    sodium chloride      dextrose         PRN Meds:  sodium chloride flush, sodium chloride, oxyCODONE **OR** oxyCODONE, HYDROmorphone **OR** HYDROmorphone, hydrOXYzine HCl, glucose, dextrose bolus **OR** dextrose bolus, glucagon (rDNA), dextrose, potassium chloride **OR** potassium alternative oral replacement **OR** potassium chloride, magnesium sulfate, ondansetron **OR** ondansetron, polyethylene glycol, acetaminophen **OR** acetaminophen      Data:  CBC:   Recent Labs     06/07/22  1513 06/08/22  0559   WBC 4.1 4.1   HGB 10.8* 11.2*   HCT 32.1* 32.6*   .0* 107.0*    210     BMP:   Recent Labs     06/07/22  1513 06/07/22  2143 06/08/22  0559     --  142   K 2.9* 3.5 3.6     --  104   CO2 27  --  26   BUN 15  --  13   CREATININE 0.9  --  0.7     LIVER PROFILE:   Recent Labs     06/07/22  1513   AST 37   ALT 17   BILITOT 0.8   ALKPHOS 120     PT/INR:   Recent Labs     06/07/22  1513   PROTIME 13.4   INR 1.04       CULTURES    COVID and Flu not detected       RADIOLOGY  XR PELVIS (1-2 VIEWS)   Final Result   Satisfactory postoperative appearance following total right hip arthroplasty. XR FEMUR RIGHT (MIN 2 VIEWS)   Final Result   Impacted right femoral neck fracture. No other acute right femur abnormality. XR TIBIA FIBULA RIGHT (2 VIEWS)   Final Result   No acute osseous abnormality of the right tib-fib. Osteopenia. Assessment/Plan:    #Impacted closed right femoral neck fracture   -hx of osteopenia   -possible pathological fracture  -orthopedic surgery consulted  -s/p right hip bipolar hemiarthroplasty today , 6/8. Bone biopsy sent out.   -PT OT initiated     #Right knee pain   -Xray  - no acute osseous abnormalities      #Hypokalemia  #Hypomagnesemia  -replaced     #Chronic macrocytic anemia  -Hgb stable   -continue to monitor   -B12 and folate pending      #Colon cancer  -on chemotherapy  -S/p Exploratory laparotomy with diverting loop sigmoid colostomy  and peritoneal biopsy on 12/31. -follows with oncology outpatient     #HTN   -continue HCTZ        #DM type 2  -continue lantus   -SSI   -continue to monitor      #CKD   -Cr stable   -continue to monitor      #Hypothyroidism   -continue synthroid      #GERD   #Hiatal hernia   -continue PPI     DVT Prophylaxis: Lovenox   Diet: ADULT DIET;  Regular  Code Status: Full Code       Claudene Rose, MD   6/8/2022

## 2022-06-08 NOTE — PROGRESS NOTES
Inpatient Occupational Therapy  Evaluation and Treatment    Unit: [address] [place]  Date:  6/8/2022  Patient Name:    Kole Begum  Admitting diagnosis:  Hypokalemia [E87.6]  Hypomagnesemia [E83.42]  Closed fracture of right hip, initial encounter [facility]) [S72.001A]  Closed right hip fracture, initial encounter ([facility] [address] Primitivo Motley Date:  6/7/2022  Precautions/Restrictions/WB Status/ Lines/ Wounds/ Oxygen: fall risk, IV, bed/chair alarm and telemetry, FWB R LE    Treatment Time:  6716-4104  Treatment Number: 1     Billable Treatment Time: 31 minutes   Total Treatment Time:  41  minutes    Patient Goals for Therapy:  \" Go to rehab. \"      Discharge Recommendations: SNF  DME needs for discharge: defer to facility       Therapy recommendations for staff:   Assist of 1 with use of rolling walker (RW) and gait belt for all transfers to/from BSC/chair    History of Present Illness: Per Tika VARGAS's 6/8/22 Note,pt is a [de-identified] y/o female with pmhx of colon cancer, CKD, GERD, HTN, hypothyroidism, HLD, Dm type 2 presented from [facility] for concern of pathological  hip fracture   -Labs remarkable for potassium 2.9, magnesium 1.3, blood glucose 186  -macrocytic anemia  -right hip xray with impacted right femoral neck fracture     6/8/22-RIGHT HIP BIPOLAR HEMIARTHROPLASTY    Home Health S4 Level Recommendation:  NA  AM-PAC Score:  16    Preadmission Environment    Pt. Lives with family (son who needs total care, on a trach. Pt is sole caregiver. Son is currently at this hospital and plans to go to the [facility] at d/c with pt)  Home environment:  one story home  Steps to enter first floor:   ramp    Steps to second floor: N/A  Bathroom:  Tub/Shower unit and elevated toilet seat,no arms, shower chair with back  Equipment owned:  [facility], [address] [facility] (RW), Rollator , manual W/C and electric WC (son's). Son has a hospital bed, delano, suction machines, nebulizer. Preadmission Status / PLOF:  History of falls   No   Pt.  Able to drive Yes  Pt Fully independent with ADL's  Yes  Pt. Required assistance from family for: Independent PTA    Pt. Fully independent for transfers and gait and walked with: No Device    Pain  Yes  Ratin  Location:hip  Pain Medicine Status: Received pain med prior to tx      Cognition    A&O Person, Place, Time and Situation   Able to follow 2 step commands    Subjective  Patient lying supine in bed with no family present   Pt agreeable to this OT eval & tx. Upper Extremity ROM:    WFL,  pt able to perform all bed mobility, transfers, and gait without ROM limitation. Upper Extremity Strength:    BUE strength WFL, but not formally assessed w/ MMT    Upper Extremity Sensation    NT    Upper Extremity Proprioception:  NT    Coordination and Tone  WFL    Balance  Functional Sitting Balance:  WFL  Functional Standing Balance:Impaired    Bed mobility:    Supine to sit:   CGA-used gait belt as a R leg   Sit to supine:   Not Tested  Rolling:    Not Tested  Scooting in sitting:  Supervision  Scooting to head of bed:   Not Tested    Bridging:   N/A    Transfers:    Sit to stand:  Min A  Stand to sit:  Min A  Bed to chair:   Min A and with use of RW  Standard toilet: Not Tested  Bed to BSC:  Min A and with use of RW    Dressing:      UE:   Not Tested  LE:    Max A    Bathing:    UE:  Not Tested  LE:  Not Tested    Eating:   Not Tested    Toileting: Mod A    Grooming: Not Tested    Activity Tolerance   Pt completed therapy session with No adverse symptoms noted w/activity       BP (mmHg)  HR (bpm) SpO2 (%)   Semifowlers before activity  142/80 81 94 on RA  Temp 96.7    Seated after activity 119/72 79 96%                 Positioning Needs:   Up in chair, call light and needs in reach.     Alarm Set    Exercise / Activities Initiated:   N/A    Patient/Family Education:   Role of OT  Recommendations for DC  Safe RW use/hand placement    Assessment of Deficits: Pt seen for Occupational therapy evaluation in acute care setting. Pt demonstrated decreased Activity tolerance, ADLs, IADLs, Balance , Bed mobility and Transfers. Pt functioning below baseline and will likely benefit from skilled occupational therapy services to maximize safety and independence. Goal(s) : To be met in 3 Visits:  1). Bed to toilet/BSC: Supervision    To be met in 5 Visits:  1). Supine to/from Sit:  Supervision  2). Upper Body Bathing:   Independent  3). Lower Body Bathing:   Independent  4). Upper Body Dressing:  Independent  5). Lower Body Dressing:  Independent with AE  6). Pt to demonstrate UE exs x 15 reps with minimal cues    Rehabilitation Potential:  Good for goals listed above. Strengths for achieving goals include: Pt motivated, PLOF and Pt cooperative  Barriers to achieving goals include:  No barriers     Plan: To be seen 3-5 x/wk while in acute care setting for therapeutic exercises, bed mobility, transfers, dressing, bathing, family/patient education, ADL/IADL retraining, energy conservation training.      Onesimo Saez MS, OTR/L  #[id number]            If patient discharges from this facility prior to next visit, this note will serve as the Discharge Summary

## 2022-06-08 NOTE — ANESTHESIA PROCEDURE NOTES
Peripheral Block    Patient location during procedure: pre-op  Staffing  Performed: anesthesiologist   Anesthesiologist: Pamela Bravo MD  Preanesthetic Checklist  Completed: patient identified, IV checked, site marked, risks and benefits discussed, surgical/procedural consents, equipment checked, pre-op evaluation, timeout performed, anesthesia consent given, oxygen available and monitors applied/VS acknowledged  Peripheral Block  Patient position: supine  Prep: ChloraPrep  Patient monitoring: cardiac monitor, continuous pulse ox and IV access  Block type: Fascia iliaca  Laterality: right  Injection technique: single-shot  Guidance: ultrasound guided  Local infiltration: lidocaine  Infiltration strength: 1 %  Dose: 3 mL  Provider prep: mask and sterile gloves  Local infiltration: lidocaine  Needle  Needle type: insulated echogenic nerve stimulator needle   Needle gauge: 21 G  Needle length: 10 cm  Needle localization: ultrasound guidance  Assessment  Injection assessment: negative aspiration for heme, no paresthesia on injection and local visualized surrounding nerve on ultrasound  Paresthesia pain: none  Slow fractionated injection: yes  Hemodynamics: stablepermanent images obtainedOutcomes: uncomplicated and patient tolerated procedure well  Additional Notes  Immediately prior to procedure a \"time out\" was called to verify the correct patient, allergies, laterality, procedure and equipment. Time out performed with [facility] RN    Local Anesthetic: 0.125 %  Bupivacaine plus epi 1 to 400K  Amount: 60 ml  in 5 ml increments after negative aspiration each time.       Reason for block: post-op pain management and at surgeon's request

## 2022-06-08 NOTE — ANESTHESIA PRE PROCEDURE
Department of Anesthesiology  Preprocedure Note       Name:  Yana Silva   Age:  [de-identified] y.o.  :  1940                                          MRN:  [id number]         Date:  2022      Surgeon: Angelica Payne):  Regulo Cantu MD    Procedure: Procedure(s):  RIGHT HIP BIPOLAR HEMIARTHROPLASTY    Medications prior to admission:   Prior to Admission medications    Medication Sig Start Date End Date Taking? Authorizing Provider   Alpha-Lipoic Acid 600 MG CAPS Take 600 mg by mouth daily Takes once in am   Yes Historical Provider, MD   vitamin B-6 (PYRIDOXINE) 100 MG tablet Take 100 mg by mouth daily   Yes Historical Provider, MD   potassium chloride (KLOR-CON M) 10 MEQ extended release tablet Take 2 tablets by mouth daily for 3 days and then take 1 tablet by mouth daily 22   PILY Dawkins CNP   LANTUS SOLOSTAR 100 UNIT/ML injection pen INJECT 10 UNITS INTO THE SKIN NIGHTLY 22   PILY Dawkins CNP   omeprazole (PRILOSEC) 40 MG delayed release capsule TAKE 1 CAPSULE BY MOUTH EVERY MORNING (BEFORE BREAKFAST) 22   PILY Dawkins CNP   glipiZIDE (GLUCOTROL XL) 5 MG extended release tablet TAKE 1 TABLET BY MOUTH DAILY 3/14/22   PILY Dawkins CNP   levothyroxine (SYNTHROID) 100 MCG tablet TAKE 1 TABLET BY MOUTH DAILY 22   PILY Dawkins CNP   Nutritional Supplements (ENSURE CLEAR) LIQD Drink one clear ensure daily 22   PILY Dawkins CNP   hydroCHLOROthiazide (HYDRODIURIL) 25 MG tablet TAKE 1 TABLET BY MOUTH DAILY 21   PILY Dawkins CNP   Insulin Pen Needle ([employer] PEN NEEDLES 29G) 29G X 12MM MISC 1 each by Does not apply route daily 21   PILY Dawkins CNP   blood glucose test strips (ASCENSIA AUTODISC VI;ONE TOUCH ULTRA TEST VI) strip 1 each by In Vitro route daily As needed.  21   PILY Dawkins CNP       Current medications:    Current Facility-Administered Medications   Medication Dose Route Frequency Provider Last Rate Last Admin    ceFAZolin (ANCEF) 2000 mg in sterile water 20 mL IV syringe  2,000 mg IntraVENous On Call to [address] [place] [address], [place]        hydroCHLOROthiazide (HYDRODIURIL) tablet 25 mg  1 tablet Oral Daily ALICIA Ashby        insulin glargine (LANTUS) injection vial 10 Units  10 Units SubCUTAneous Nightly ALICIA Ashby        levothyroxine (SYNTHROID) tablet 100 mcg  100 mcg Oral Daily ALICIA Ashby   100 mcg at 06/08/22 0515    pantoprazole (PROTONIX) tablet 40 mg  40 mg Oral QAM  ALICIA Lim   40 mg at 06/08/22 0514    glucose chewable tablet 16 g  4 tablet Oral PRN ALICIA Ashby        dextrose bolus 10% 125 mL  125 mL IntraVENous PRN ALICIA Ashby        Or    dextrose bolus 10% 250 mL  250 mL IntraVENous PRN LAICIA Ashby        glucagon (rDNA) injection 1 mg  1 mg IntraMUSCular PRN ALICIA Ashby        dextrose 5 % solution  100 mL/hr IntraVENous PRN ALICIA Ashby        insulin lispro (HUMALOG) injection vial 0-6 Units  0-6 Units SubCUTAneous TID  ALICIA Lim        insulin lispro (HUMALOG) injection vial 0-3 Units  0-3 Units SubCUTAneous Nightly ALICIA Ashby        sodium chloride flush 0.9 % injection 5-40 mL  5-40 mL IntraVENous 2 times per day ALICIA Ashby        sodium chloride flush 0.9 % injection 10 mL  10 mL IntraVENous PRN ALICIA Ashby        0.9 % sodium chloride infusion   IntraVENous PRN ALICIA Ashby 100 mL/hr at 06/07/22 2139 New Bag at 06/07/22 2139    potassium chloride (KLOR-CON M) extended release tablet 40 mEq  40 mEq Oral PRN ALICIA Ashby        Or    potassium bicarb-citric acid (EFFER-K) effervescent tablet 40 mEq  40 mEq Oral PRN ALICIA Ashby        Or    potassium chloride 10 mEq/100 mL IVPB (Peripheral Line)  10 mEq IntraVENous PRN ALICIA Ashby        magnesium sulfate 1000 mg in dextrose 5% 100 mL IVPB  1,000 mg IntraVENous PRN Alex Calhoun, PA        enoxaparin (LOVENOX) injection 40 mg  40 mg SubCUTAneous Daily ALICIA Leger        ondansetron (ZOFRAN-ODT) disintegrating tablet 4 mg  4 mg Oral Q8H PRN ALICIA Leger        Or    ondansetron (ZOFRAN) injection 4 mg  4 mg IntraVENous Q6H PRN Alex Calhoun, PA        polyethylene glycol (GLYCOLAX) packet 17 g  17 g Oral Daily PRN Alex Calhoun, PA        acetaminophen (TYLENOL) tablet 650 mg  650 mg Oral Q6H PRN ALICIA Leger   650 mg at 06/08/22 8707    Or    acetaminophen (TYLENOL) suppository 650 mg  650 mg Rectal Q6H PRN Alex Calhoun, PA        [Held by provider] potassium chloride (KLOR-CON M) extended release tablet 10 mEq  10 mEq Oral Daily ALICIA Leger           Allergies:     Allergies   Allergen Reactions    Food Color Pink Anaphylaxis    Shellfish Allergy Anaphylaxis     Heart scan    Atorvastatin      Other reaction(s): Weakness  Leg weakness    Ramipril      Other reaction(s): Cough    Metformin Nausea And Vomiting       Problem List:    Patient Active Problem List   Diagnosis Code    Type 2 diabetes mellitus with hyperglycemia (HCC) E11.65    Essential hypertension I10    Hypothyroidism E03.9    Family history of early CAD Z80.55    Mixed hyperlipidemia E78.2    Precordial pain R07.2    Statin intolerance Z78.9    Thyrotoxicosis E05.90    Chronic pain of right knee M25.561, G89.29    Heart murmur R01.1    Vitamin D deficiency E55.9    Iron deficiency anemia D50.9    CKD (chronic kidney disease) stage 3, GFR 30-59 ml/min (HCC) N18.30    Abnormal weight loss R63.4    Gastroesophageal reflux disease with esophagitis K21.00    SBO (small bowel obstruction) (HCC) K56.609    Hypokalemia E87.6    Colonic mass K63.89    Carcinomatosis (HCC) 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    Moderate protein-calorie malnutrition (HCC) E44.0    Malignant neoplasm of sigmoid colon (HCC) C18.7    Closed fracture of right hip ([facility] S72.001A       Past Medical History:        Diagnosis Date    Carcinomatosis ([facility]     CKD (chronic kidney disease) stage 3, GFR 30-59 ml/min (formerly [facility]) 9/17/2019    Gastroesophageal reflux disease without esophagitis 7/20/2021    Hypertension     Hypothyroidism     Mixed hyperlipidemia     Obesity     Thyrotoxicosis     Type 2 diabetes mellitus without complication ([facility]     Type II or unspecified type diabetes mellitus without mention of complication, not stated as uncontrolled        Past Surgical History:        Procedure Laterality Date    ANKLE FRACTURE SURGERY Right 2007    CARPAL TUNNEL RELEASE Left 1998    CATARACT REMOVAL WITH IMPLANT Right 03/01/2018     PHACO EMULSIFICATION OF CATARACT WITH INTRA OCULAR LENS IMPLANT RIGHT EYE    CATARACT REMOVAL WITH IMPLANT Left 06/14/2018    COLOSTOMY N/A 12/31/2021    EXPLORATORY LAPAROTOMY, DIVERTING LOOP COLOSTOMY, PERITONEAL BIOPSY performed by Travis Spaulding MD at [facility] ([address] [place] [address])      KNEE ARTHROSCOPY Right 2015    PORT SURGERY Left 01/31/2022    [facility] Port placement    PORT SURGERY N/A 1/31/2022    PORT INSERTION performed by Lacey Hua MD at [place] [address] History:    Social History     Tobacco Use    Smoking status: Never Smoker    Smokeless tobacco: Never Used   Substance Use Topics    Alcohol use:  No                                Counseling given: Not Answered      Vital Signs (Current):   Vitals:    06/07/22 2107 06/07/22 2130 06/07/22 2247 06/08/22 0154   BP: 127/68   117/64   Pulse: 81  73 79   Resp: 16   16   Temp: 97.3 °F (36.3 °C)   97.4 °F (36.3 °C)   TempSrc: Oral   Oral   SpO2: 98%   94%   Weight:  153 lb 12.8 oz (69.8 kg)     Height:  5' 7\" (1.702 m)                                                BP Readings from Last 3 Encounters:   06/08/22 117/64   01/31/22 (!) 153/65   01/31/22 (!) 149/80       NPO Status: Time of last liquid consumption: 2339                        Time of last solid consumption: 2339                        Date of last liquid consumption: 06/07/22                        Date of last solid food consumption: 06/07/22    BMI:   Wt Readings from Last 3 Encounters:   06/07/22 153 lb 12.8 oz (69.8 kg)   01/31/22 152 lb (68.9 kg)   12/30/21 165 lb (74.8 kg)     Body mass index is 24.09 kg/m².     CBC:   Lab Results   Component Value Date    WBC 4.1 06/08/2022    RBC 3.05 06/08/2022    HGB 11.2 06/08/2022    HCT 32.6 06/08/2022    .0 06/08/2022    RDW 15.3 06/08/2022     06/08/2022       CMP:   Lab Results   Component Value Date     06/08/2022    K 3.6 06/08/2022     06/08/2022    CO2 26 06/08/2022    BUN 13 06/08/2022    CREATININE 0.7 06/08/2022    GFRAA >60 06/08/2022    AGRATIO 1.2 06/07/2022    LABGLOM >60 06/08/2022    LABGLOM 59 02/20/2013    GLUCOSE 99 06/08/2022    PROT 6.7 06/07/2022    PROT 7.3 02/20/2013    CALCIUM 9.5 06/08/2022    BILITOT 0.8 06/07/2022    ALKPHOS 120 06/07/2022    AST 37 06/07/2022    ALT 17 06/07/2022       POC Tests:   Recent Labs     06/08/22  0152   POCGLU 87       Coags:   Lab Results   Component Value Date    PROTIME 13.4 06/07/2022    INR 1.04 06/07/2022       HCG (If Applicable): No results found for: PREGTESTUR, PREGSERUM, HCG, HCGQUANT     ABGs: No results found for: PHART, PO2ART, GZP0JNX, JFJ6JPA, BEART, E0ZYNXPI     Type & Screen (If Applicable):  No results found for: LABABO, LABRH    Drug/Infectious Status (If Applicable):  No results found for: HIV, HEPCAB    COVID-19 Screening (If Applicable):   Lab Results   Component Value Date    COVID19 NOT DETECTED 06/07/2022           Anesthesia Evaluation   no history of anesthetic complications:   Airway: Mallampati: II  TM distance: <3 FB   Neck ROM: limited  Mouth opening: > = 3 FB   Dental:    (+) upper dentures and lower dentures      Pulmonary:Negative Pulmonary ROS                              Cardiovascular:    (+) hypertension:, hyperlipidemia                  Neuro/Psych:   Negative Neuro/Psych ROS              GI/Hepatic/Renal:   (+) GERD: well controlled, renal disease: CRI,           Endo/Other:    (+) DiabetesType II DM, , hypothyroidism::., malignancy/cancer. Abdominal:             Vascular: negative vascular ROS. Other Findings:           Anesthesia Plan      general     ASA 3     (Risks, benefits and alternatives of GETA for operative care and ultrasound guided fascia iliaca compartment nerve block for post operative analgesia discussed with pt. All questions answered. Willing to proceed.)  Induction: intravenous. Anesthetic plan and risks discussed with patient.               Post-op pain plan if not by surgeon: single peripheral nerve block            ERNESTINA Thomson MD   6/8/2022

## 2022-06-08 NOTE — BRIEF OP NOTE
Brief Postoperative Note      Patient: Meena Quintana  YOB: 1940  MRN: [id number]    Date of Procedure: 6/8/2022    Pre-Op Diagnosis: Closed fracture of right hip, initial encounter ([facility] [S72.001A]    Post-Op Diagnosis: Same       Procedure(s):  RIGHT HIP BIPOLAR HEMIARTHROPLASTY    Surgeon(s):  Jose Reyes MD    Assistant:  Surgical Assistant: Racheal Scruggs    Anesthesia: General    Estimated Blood Loss (mL): less than 974     Complications: None    Specimens:   ID Type Source Tests Collected by Time Destination   A :  Bone Bone SURGICAL PATHOLOGY Jose Reyes MD 6/8/2022 9256        Implants:  Implant Name Type Inv.  Item Serial No.  Lot No. LRB No. Used Action   SHELL ACET BPLR 48 MM HIP - [id number]  SHELL ACET BPLR 48 MM HIP  [employer] [id number] Right 1 Implanted   STEM FEM UNIV 0+ 28 MM HIP COCR - [id number]  STEM FEM UNIV 0+ 28 MM HIP COCR  [employer] [id number] Right 1 Implanted   Femoral Stem     [id number] Right 1 Implanted   multipolar bipolar cup     [id number] Right 1 Implanted         Drains:   Colostomy LLQ Loop (Active)   Stomal Appliance 2 piece 06/08/22 0043   Stoma  Assessment Red 06/08/22 0043   Peristomal Assessment Clean, dry & intact 06/08/22 0043   Stool Color Brown 06/08/22 0043   Stool Amount Small 06/08/22 0043       Findings: right femoral neck fracture    Electronically signed by Jose Reyes MD on 6/8/2022 at 9:17 AM

## 2022-06-08 NOTE — PLAN OF CARE
Problem: Discharge Planning  Goal: Discharge to home or other facility with appropriate resources  Outcome: Progressing  Flowsheets (Taken 6/8/2022 1115)  Discharge to home or other facility with appropriate resources:   Identify barriers to discharge with patient and caregiver   Arrange for interpreters to assist at discharge as needed   Identify discharge learning needs (meds, wound care, etc)   Arrange for needed discharge resources and transportation as appropriate   Refer to discharge planning if patient needs post-hospital services based on physician order or complex needs related to functional status, cognitive ability or social support system     Problem: Pain  Goal: Verbalizes/displays adequate comfort level or baseline comfort level  Outcome: Progressing  Flowsheets  Taken 6/8/2022 1315  Verbalizes/displays adequate comfort level or baseline comfort level: Encourage patient to monitor pain and request assistance  Taken 6/8/2022 1115  Verbalizes/displays adequate comfort level or baseline comfort level: Encourage patient to monitor pain and request assistance     Problem: Skin/Tissue Integrity  Goal: Absence of new skin breakdown  Description: 1. Monitor for areas of redness and/or skin breakdown  2. Assess vascular access sites hourly  3. Every 4-6 hours minimum:  Change oxygen saturation probe site  4. Every 4-6 hours:  If on nasal continuous positive airway pressure, respiratory therapy assess nares and determine need for appliance change or resting period.   Outcome: Progressing     Problem: Safety - Adult  Goal: Free from fall injury  Outcome: Progressing     Problem: Chronic Conditions and Co-morbidities  Goal: Patient's chronic conditions and co-morbidity symptoms are monitored and maintained or improved  Outcome: Progressing  Flowsheets (Taken 6/8/2022 1115)  Care Plan - Patient's Chronic Conditions and Co-Morbidity Symptoms are Monitored and Maintained or Improved:   Monitor and assess patient's chronic conditions and comorbid symptoms for stability, deterioration, or improvement   Collaborate with multidisciplinary team to address chronic and comorbid conditions and prevent exacerbation or deterioration   Update acute care plan with appropriate goals if chronic or comorbid symptoms are exacerbated and prevent overall improvement and discharge

## 2022-06-08 NOTE — OP NOTE
Operative Note      Patient: Dane Armstrong  YOB: 1940  MRN: [id number]    Date of Procedure: 6/8/2022    Pre-Op Diagnosis: Closed fracture of right hip, initial encounter ([facility] [S72.001A]    Post-Op Diagnosis: Same       Procedure(s):  RIGHT HIP BIPOLAR HEMIARTHROPLASTY    Surgeon(s):  Ysabel Noyola MD    Assistant:   Surgical Assistant: Skeet Meter    Anesthesia: General    Estimated Blood Loss (mL): less than 264     Complications: None    Specimens:   ID Type Source Tests Collected by Time Destination   A :  Bone Bone SURGICAL PATHOLOGY Ysabel Noyola MD 6/8/2022 8702        Implants:  Implant Name Type Inv. Item Serial No.  Lot No. LRB No. Used Action   SHELL ACET BPLR 48 MM HIP - [id number]  SHELL ACET BPLR 48 MM HIP  [employer] [id number] Right 1 Implanted   STEM FEM UNIV 0+ 28 MM HIP COCR - [id number]  STEM FEM UNIV 0+ 28 MM HIP COCR  [employer] [id number] Right 1 Implanted   Femoral Stem     [id number] Right 1 Implanted   multipolar bipolar cup     [id number] Right 1 Implanted         Drains:   Colostomy LLQ Loop (Active)   Stomal Appliance 2 piece 06/08/22 0043   Stoma  Assessment Red 06/08/22 0043   Peristomal Assessment Clean, dry & intact 06/08/22 0043   Stool Color Brown 06/08/22 0043   Stool Amount Small 06/08/22 0043       Findings: right femoral neck fracture    Detailed Description of Procedure:     Implants:   Implant Name Type Inv. Item Serial No.  Lot No. LRB No. Used Action   SHELL ACET BPLR 48 MM HIP - [id number]  SHELL ACET BPLR 48 MM HIP  [employer] [id number] Right 1 Implanted   STEM FEM UNIV 0+ 28 MM HIP COCR - [id number]  STEM FEM UNIV 0+ 28 MM HIP COCR  [employer] [id number] Right 1 Implanted   Femoral Stem     [id number] Right 1 Implanted   multipolar bipolar cup     [id number] Right 1 Implanted       Indication:  Displaced Femoral Neck Fracture    Description of Procedure: The patient was seen in the holding room.  The risks, benefits, complications, treatment options, and expected outcomes were discussed with the patient. The risks and potential complications of their problem and purposed treatment include but are not limited to infection, nerve injury, vascular injury, potential skin necrosis, deep vein thrombosis, possible pulmonary embolus, complications of the anesthetics and failure of the implant. The patient concurred with the proposed plan, giving informed consent. The site of surgery properly noted/marked. The patient was brought to the operating room. The patient was given general endotracheal anesthesia and positioned in the side lying position using a deflated beanbag. A Time Out was held and the above information confirmed. Axillary roll was used and all bony prominences were well padded and supported. The appropriate preoperative antibiotics were administered intravenously prior to the beginning of the procedure. A posterior approach to the hip was created with an incision centered over the greater trochanter extending proximally towards the posterior superior iliac spine and distally over the femoral shaft. The incision was carried down sharply through skin and subcutaneous tissues. The fascia was split in line with the skin incision. The fibers of the gluteus were split in line with the t skin incision. The sciatic nerve was identified and the Charnley retractor was placed. The piriformis and short external rotators were identified and released from their insertion on the piriformis fossa, tagged and reflected posteriorly to protect the sciatic nerve. A trapezoidal capsulotomy was performed and the corners of the capsule were tagged and reflected posteriorly. A femoral neck cut was made using the guide the head was extracted using a corkscrew. The head was sized and the appropriate sized guide was placed into the acetabulum to confirm good fit.  Attention was then turned to the femur and it was sequentially reamed and broached until the appropriate-sized broach was obtained. Our reduction was then performed and good stability was achieved. The final implant was then impacted into the femur and final reduction was performed. After final reduction full flexion of the hip with internal rotation almost to 90?was tolerated without dislocation. Wound was then copiously irrigated with antibiotic solution using the pulsatile lavage. Fascia was closed with #1 Vicryl suture subcutaneous tissue closed with 0 and 2 Vicryl suture. The skin was closed with a running 4-0 Monocryl suture in subcuticular fashion. Sterile dressings were applied. Hip abduction pillow was placed and the patient was transported to the recovery in stable condition.         Electronically signed by Tammie Chavarria MD on 6/8/2022 at 10:10 AM

## 2022-06-08 NOTE — CARE COORDINATION
INTERDISCIPLINARY PLAN OF CARE CONFERENCE    Date/Time: 6/8/2022 2:02 PM  Completed by: Gagan Lockett RN, Case Management      Patient Name:  Vidya Ziegler  YOB: 1940  Admitting Diagnosis: Hypokalemia [E87.6]  Hypomagnesemia [E83.42]  Closed fracture of right hip, initial encounter ([facility] [S72.001A]  Closed right hip fracture, initial encounter ([facility] Idella Cast     Admit Date/Time:  6/7/2022  2:55 PM    Chart reviewed. Interdisciplinary team contacted or reviewed plan related to patient progress and discharge plans. Disciplines included Case Management, Nursing, and Dietitian. Current Status: Stable  PT/OT recommendation for discharge plan of care: Pending    Expected D/C Disposition:  Rehab  Confirmed plan with patient  Yes   Met with:patient  Discharge Plan Comments: Reviewed chart and met with pt at bedside. Plan remains for [facility] [place] for re[place]. Spoke with John at central intake for [facility] who confirms pt has bed at [place]. Will cont to follow.     Home O2 in place on admit: No  Pt informed of need to bring portable home O2 tank on day of discharge for nursing to connect prior to leaving:  Not Indicated  Verbalized agreement/Understanding:  Not Indicated

## 2022-06-08 NOTE — PROGRESS NOTES
Patient admitted to room __208__ from  ER. Patient oriented to room, call light, bed rails, phone, lights and bathroom. Patient instructed about the schedule of the day including: vital sign frequency, lab draws, possible tests, frequency of MD and staff rounds, daily weights, I &O's and prescribed diet. Bed alarm deferred patient low fall risk and refuses alarm. Telemetry box in place, patient aware of placement and reason. Bed locked, in lowest position, side rails up 2/4, call light within reach. Recliner Assessment:     Patient is not able to demonstrate the ability to move from a reclining position to an upright position within the recliner due to R hip FX.       4 Eyes Skin Assessment     The patient is being assess for   Admission    I agree that 2 RN's have performed a thorough Head to Toe Skin Assessment on the patient. ALL assessment sites listed below have been assessed. Areas assessed for pressure by both nurses:   [x]   Head, Face, and Ears   [x]   Shoulders, Back, and Chest, Abdomen  [x]   Arms, Elbows, and Hands   [x]   Coccyx, Sacrum, and Ischium  [x]   Legs, Feet, and Heels        Skin Assessed Under all Medical Devices by both nurses:  n/a              All Mepilex Borders were peeled back and area peeked at by both nurses:  No: n/a  Please list where Mepilex Borders are located:  n/a             **SHARE this note so that the co-signing nurse is able to place an eSignature**    Co-signer eSignature: {Esignature:[id number]}    Does the Patient have Skin Breakdown related to pressure?   No     (Insert Photo here n/a)         Juan Manuel Prevention initiated:  NA   Wound Care Orders initiated:  No      [facility] nurse consulted for Pressure Injury (Stage 3,4, Unstageable, DTI, NWPT, Complex wounds)and New or Established Ostomies:  NA      Primary Nurse eSignature: Electronically signed by Elba Eller RN on 6/7/22 at 11:02 PM EDT

## 2022-06-08 NOTE — FLOWSHEET NOTE
06/08/22 1115   Vital Signs   Temp (!) 95.6 °F (35.3 °C)   Temp Source Oral   Heart Rate 75   Heart Rate Source Monitor   Resp 16   BP (!) 142/69   BP Location Left upper arm   BP Method Automatic   MAP (Calculated) 93.33   Patient Position Semi fowlers   Level of Consciousness Alert (0)   MEWS Score 1   Pain Assessment   Pain Assessment 0-10   Pain Level 5   Patient's Stated Pain Goal 2   Pain Location Hip   Pain Orientation Right   Pain Descriptors Sore;Squeezing   Functional Pain Assessment Prevents or interferes some active activities and ADLs   Pain Type Surgical pain   Pain Frequency Continuous   Pain Onset On-going   Non-Pharmaceutical Pain Intervention(s) Ice   Care Plan - Pain Goals   Verbalizes/displays adequate comfort level or baseline comfort level Encourage patient to monitor pain and request assistance   Opioid-Induced Sedation   POSS Score 1   RASS   Whatley Agitation Sedation Scale (RASS) 0   Oxygen Therapy   SpO2 98 %   Pulse Oximetry Type Intermittent   O2 Device Nasal cannula   O2 Flow Rate (L/min) 2 L/min     AM assessment completed and vital signs completed, see flowsheet. Patient is alert & oriented. No complaints voiced. Patient denies further needs. Side rails up X 2. Bed in the lowest position. Call light within reach.

## 2022-06-08 NOTE — PROGRESS NOTES
Inpatient Physical Therapy Evaluation and Treatment    Unit: 2 [place]  Date:  6/8/2022  Patient Name:    Axel Hart  Admitting diagnosis:  Hypokalemia [E87.6]  Hypomagnesemia [E83.42]  Closed fracture of right hip, initial encounter [facility]) [S72.001A]  Closed right hip fracture, initial encounter ([facility] Raenette Rinne  Admit Date:  6/7/2022  Precautions/Restrictions/WB Status/ Lines/ Wounds/ Oxygen: Fall risk, Bed/chair alarm, Lines -IV, Telemetry and WB Restrictions (FWB on the RLE)    Treatment Time:  1357 - 1437  Treatment Number:  1   Timed Code Treatment Minutes: 29 minutes  Total Treatment Minutes:  39  minutes    Patient Goals for Therapy: \" Go home \"          Discharge Recommendations: SNF  DME needs for discharge: defer to facility       Therapy recommendation for EMS Transport: can transport by wheelchair    Therapy recommendations for staff:   Assist of 1 with use of rolling walker (RW) and gait belt for all transfers and ambulation to/from [facility]  to/from chair    History of Present Illness: Per Vain VARGAS's 6/8/22 Note,pt is a [de-identified] y/o female with pmhx of colon cancer, CKD, GERD, HTN, hypothyroidism, HLD, Dm type 2 presented from [facility] for concern of pathological  hip fracture   -Labs remarkable for potassium 2.9, magnesium 1.3, blood glucose 186  -macrocytic anemia  -right hip xray with impacted right femoral neck fracture      6/8/22-RIGHT HIP BIPOLAR HEMIARTHROPLASTY    Home Health S4 Level Recommendation:  Level 3 Safety  AM-PAC Mobility Score    AM-PAC Inpatient Mobility Raw Score : 16         Preadmission Environment    Pt. Lives with family (son who needs total care, on a trach. Pt is sole caregiver.   Son is currently at this hospital and plans to go to the [facility] at d/c with pt)  Home environment:    one story home  Steps to enter first floor:   ramp            Steps to second floor: N/A  Bathroom:       Tub/Shower unit and elevated toilet seat,no arms, shower chair with back  Equipment owned: SPC, Rolling Walker (RW), Rollator , manual W/C and electric WC (son's). Son has a hospital bed, delano, suction machines, nebulizer.     Preadmission Status / PLOF:  History of falls             No          Pt. Able to drive          Yes  Pt Fully independent with ADL's         Yes  Pt. Required assistance from family for: Independent PTA    Pt. Fully independent for transfers and gait and walked with: No Device    Pain   Yes  Location: R hip   Ratin /10  Pain Medicine Status: Received pain med prior to tx    Cognition    A&O x4   Able to follow 2 step commands    Subjective  Patient lying supine in bed with no family present. Pt agreeable to this PT eval & tx. Upper Extremity ROM/Strength  Please see OT evaluation.       Lower Extremity ROM / Strength   AROM WFL: Yes  ROM limitations: N/A    Strength Assessment (measured on a 0-5 scale):  R LE   Quad   3   Ant Tib  4   Hamstring 3-   Iliopsoas 3-  L LE  Quad   4   Ant Tib  4   Hamstring 4   Iliopsoas 4    Lower Extremity Sensation    WFL    Lower Extremity Proprioception:   WFL    Coordination and Tone  WFL    Balance  Sitting:  Fair ; CGA  Comments:     Standing: Fair -; Min A   Comments: ~3 min    Bed Mobility   Supine to Sit:    CGA  Sit to Supine:   Not Tested  Rolling:   Not Tested  Scooting in sitting: CGA  Scooting in supine:  Not Tested    Transfer Training     Sit to stand:   Min A   Stand to sit:   Min A   Bed to Chair:   Min A  with use of gait belt and rolling walker (RW)    Gait gait completed as indicated below  Distance:      4 ft + 3 ft  Deviations (firm surface/linoleum):  decreased aundrea, increased AMY, decreased step length bilaterally and decreased stance time bilaterally  Assistive Device Used:    gait belt and rolling walker (RW)  Level of Assist:    Min A   Comment:     Stair Training deferred, pt unsafe/ not appropriate to complete stairs at this time    Activity Tolerance   Pt completed therapy session with Dizziness noted with standing activities and ambulation    BP (mmHg)     HR (bpm) SpO2 (%)   Semifowlers before activity  142/80 81 94 on RA  Temp 96.7    Seated after activity 119/72 79 96%       Positioning Needs   Pt up in chair, alarm set, positioned in proper neutral alignment and pressure relief provided. Call light provided and all needs within reach    Exercises Initiated  all completed bilaterally unless indicated  Ankle Pumps x 10 reps  Heel slides x 10 reps (AAROM on the RLE)    Other  None. Patient/Family Education   Pt educated on role of inpatient PT, POC, importance of continued activity, DC recommendations, safety awareness, transfer techniques and calling for assist with mobility. Assessment  Pt seen for Physical Therapy evaluation in acute care setting. Pt demonstrated decreased Activity tolerance, Balance, ROM, Safety and Strength as well as decreased independence with Ambulation, Bed Mobility  and Transfers. Recommending SNF upon discharge as patient functioning well below baseline, demonstrates good rehab potential and unable to return home due to limited or no family support, burden of care beyond caregiver ability, home environment not conducive to patient recovery and limited safety awareness. Goals : To be met in 3 visits:  1). Independent with LE Ex x 10 reps    To be met in 6 visits:  1). Supine to/from sit: SBA  2). Sit to/from stand: CGA  3). Bed to chair: CGA  4). Gait: Ambulate 50 ft.  with  CGA and use of LRAD (least restrictive assistive device)  5). Tolerate B LE exercises 3 sets of 10-15 reps      Rehabilitation Potential: Good  Strengths for achieving goals include:   Pt motivated and Pt cooperative   Barriers to achieving goals include:    Weakness    Plan    To be seen 5-7 x / week while in acute care setting for therapeutic exercises, bed mobility, transfers, progressive gait training, balance training, and family/patient education.     Signature: Breonna Bhandari, MS PT, # [id number]    If patient discharges from this facility prior to next visit, this note will serve as the Discharge Summary.

## 2022-06-08 NOTE — PROGRESS NOTES
Patient admitted from OR to PACU. Bedside report received. Patient immediately hooked up to heart monitor. Andre Manley RN

## 2022-06-08 NOTE — ANESTHESIA POSTPROCEDURE EVALUATION
Department of Anesthesiology  Postprocedure Note    Patient: Dank Zaidi  MRN: [id number]  YOB: 1940  Date of evaluation: 6/8/2022  Time:  12:12 PM     Procedure Summary     Date: 06/08/22 Room / Location: [address] [place] [address] / [facility]    Anesthesia Start: 2323 Anesthesia Stop: 8525    Procedure: RIGHT HIP BIPOLAR HEMIARTHROPLASTY (Right Hip) Diagnosis:       Closed fracture of right hip, initial encounter ([facility]      (Closed fracture of right hip, initial encounter ([facility] Thelda Hodgkin)    Surgeons: Renay Rashid MD Responsible Provider: Molly Carlson MD    Anesthesia Type: general ASA Status: 3          Anesthesia Type: No value filed. Lani Phase I: Lani Score: 8    Lani Phase II:      Last vitals: Reviewed and per EMR flowsheets. Anesthesia Post Evaluation    Patient location during evaluation: PACU  Patient participation: complete - patient participated  Level of consciousness: awake and alert  Airway patency: patent  Nausea & Vomiting: no nausea and no vomiting  Complications: no  Cardiovascular status: blood pressure returned to baseline  Respiratory status: acceptable  Hydration status: euvolemic  Comments: VSS on transfer to phase 2 recovery. No anesthetic complications.

## 2022-06-08 NOTE — H&P
Date of Surgery Update: Aristides Lehman was seen, history and physical examination reviewed, and patient examined by me today. There have been no significant clinical changes since the completion of the previous history and physical.    The risk, benefits, and alternatives of the proposed procedure have been explained to the patient (or appropriate guardian) and understanding verbalized. All questions answered. Patient wishes to proceed.     Electronically signed by: Sita Christine MD,6/8/2022,7:17 AM

## 2022-06-08 NOTE — PROGRESS NOTES
Comprehensive Nutrition Assessment    Type and Reason for Visit:  Initial,Positive Nutrition Screen (+ screen for MST = 3)    Nutrition Recommendations/Plan:   1. Continue ADULT DIET; Regular diet order. 2. Added Ensure high-protein with breakfast meal.   3. Monitor appetite, meal intake, and acceptance/intake of ONS. 4. Monitor nutrition-related labs, bowel function, R hip surgical site, and weight trends. Malnutrition Assessment:  Malnutrition Status: At risk for malnutrition (06/08/22 1626)    Context:  Acute Illness     Findings of the 6 clinical characteristics of malnutrition:  Energy Intake:  Mild decrease in energy intake   Weight Loss:  Unable to assess     Body Fat Loss:  Unable to assess     Muscle Mass Loss:  Unable to assess    Fluid Accumulation:  No significant fluid accumulation     Strength:  Not Performed    Nutrition Assessment:    patient is nutritionally compromised AEB hx of colon cancer and R hip fracture with need for R hip bipolar hemiarthroplasty today and she is at risk for further compromise d/t no po intake documented for this admission, surgical wound, and altered nutrition-related labs; will continue ADULT DIET; Regular diet order and added Ensure high-protein with breakfast meal    Nutrition Related Findings:    patient is alert and oriented; + LLQ ostomy; abdomen is round, soft, non-tender, and hypoactive; hx of colon cancer - diagnosed in Jan. 2022; patient had R hip bipolar hemiarthroplasty surgery today; no po intake data documented in flow sheets; patient ahs LR at 125 ml/hr, Senokot-S, 10 units Lantus nightly, low-dose SSI, synthroid, and protonix ordered at this time; hgb A1c was 7.0% on 6/8/22 Wound Type: Surgical Incision (R hip surgical incision)       Current Nutrition Intake & Therapies:    Average Meal Intake: Unable to assess (no po intake data documented)  Average Supplements Intake: Unable to assess (ONS ordered this afternoon)  ADULT DIET;  Regular  ADULT ORAL NUTRITION SUPPLEMENT; Breakfast; Low Calorie/High Protein Oral Supplement    Anthropometric Measures:  Height: 5' 7\" (170.2 cm)  Ideal Body Weight (IBW): 135 lbs (61 kg)    Admission Body Weight: 153 lb 12.8 oz (69.8 kg) (obtained on 6/7/22; actual weight)  Current Body Weight: 153 lb 12.8 oz (69.8 kg) (obtained on 6/7/22; actual weight), 113.9 % IBW.  Weight Source: Bed Scale  Current BMI (kg/m2): 24.1  Usual Body Weight:  (unable to assess d/t lack of actual weight hx in EMR)     Weight Adjustment For: No Adjustment                 BMI Categories: Normal Weight (BMI 22.0 to 24.9) age over 72    Estimated Daily Nutrient Needs:  Energy Requirements Based On: Kcal/kg  Weight Used for Energy Requirements: Current  Energy (kcal/day): 1750 - 1960 kcals based on 25-28 kcals/kg/CBW  Weight Used for Protein Requirements: Current  Protein (g/day): 91 - 105 g protein based on 1.3-1.5 g/kg/CBW  Method Used for Fluid Requirements: 1 ml/kcal  Fluid (ml/day): 1750 - 1960 ml    Nutrition Diagnosis:   · Inadequate oral intake related to inadequate protein-energy intake,increase demand for energy/nutrients,acute injury/trauma,pain as evidenced by poor intake prior to admission,wounds,lab values,other (hx of colon cancer; R hip surgery today)      Nutrition Interventions:   Food and/or Nutrient Delivery: Continue Current Diet,Start Oral Nutrition Supplement  Nutrition Education/Counseling: No recommendation at this time  Coordination of Nutrition Care: Continue to monitor while inpatient       Goals:  Previous Goal Met:  (N/A)  Goals: PO intake 75% or greater,by next RD assessment       Nutrition Monitoring and Evaluation:   Behavioral-Environmental Outcomes: None Identified  Food/Nutrient Intake Outcomes: Food and Nutrient Intake,Supplement Intake,IVF Intake  Physical Signs/Symptoms Outcomes: Biochemical Data,Meal Time Behavior,Nutrition Focused Physical Findings,Skin,Weight    Discharge Planning:    Continue current diet Olinda Mcelroy, [address] [place] [address],   Contact: [phone]

## 2022-06-09 LAB
ANION GAP SERPL CALCULATED.3IONS-SCNC: 10 MMOL/L (ref 3–16)
BASOPHILS ABSOLUTE: 0 K/UL (ref 0–0.2)
BASOPHILS RELATIVE PERCENT: 0.2 %
BUN BLDV-MCNC: 21 MG/DL (ref 7–20)
CALCIUM SERPL-MCNC: 8.6 MG/DL (ref 8.3–10.6)
CHLORIDE BLD-SCNC: 102 MMOL/L (ref 99–110)
CO2: 25 MMOL/L (ref 21–32)
CREAT SERPL-MCNC: 0.8 MG/DL (ref 0.6–1.2)
EOSINOPHILS ABSOLUTE: 0 K/UL (ref 0–0.6)
EOSINOPHILS RELATIVE PERCENT: 0.1 %
GFR AFRICAN AMERICAN: >60
GFR NON-AFRICAN AMERICAN: >60
GLUCOSE BLD-MCNC: 178 MG/DL (ref 70–99)
GLUCOSE BLD-MCNC: 181 MG/DL (ref 70–99)
GLUCOSE BLD-MCNC: 193 MG/DL (ref 70–99)
GLUCOSE BLD-MCNC: 217 MG/DL (ref 70–99)
GLUCOSE BLD-MCNC: 250 MG/DL (ref 70–99)
GLUCOSE BLD-MCNC: 288 MG/DL (ref 70–99)
HCT VFR BLD CALC: 26.7 % (ref 36–48)
HEMOGLOBIN: 9.2 G/DL (ref 12–16)
LYMPHOCYTES ABSOLUTE: 1 K/UL (ref 1–5.1)
LYMPHOCYTES RELATIVE PERCENT: 17.1 %
MCH RBC QN AUTO: 37 PG (ref 26–34)
MCHC RBC AUTO-ENTMCNC: 34.3 G/DL (ref 31–36)
MCV RBC AUTO: 107.8 FL (ref 80–100)
MONOCYTES ABSOLUTE: 0.7 K/UL (ref 0–1.3)
MONOCYTES RELATIVE PERCENT: 11.7 %
NEUTROPHILS ABSOLUTE: 4.1 K/UL (ref 1.7–7.7)
NEUTROPHILS RELATIVE PERCENT: 70.9 %
PDW BLD-RTO: 15.3 % (ref 12.4–15.4)
PERFORMED ON: ABNORMAL
PLATELET # BLD: 191 K/UL (ref 135–450)
PMV BLD AUTO: 7.2 FL (ref 5–10.5)
POTASSIUM REFLEX MAGNESIUM: 3.7 MMOL/L (ref 3.5–5.1)
RBC # BLD: 2.48 M/UL (ref 4–5.2)
SODIUM BLD-SCNC: 137 MMOL/L (ref 136–145)
WBC # BLD: 5.7 K/UL (ref 4–11)

## 2022-06-09 PROCEDURE — 36415 COLL VENOUS BLD VENIPUNCTURE: CPT

## 2022-06-09 PROCEDURE — APPNB60 APP NON BILLABLE TIME 46-60 MINS: Performed by: PHYSICIAN ASSISTANT

## 2022-06-09 PROCEDURE — 2580000003 HC RX 258: Performed by: ORTHOPAEDIC SURGERY

## 2022-06-09 PROCEDURE — 1200000000 HC SEMI PRIVATE

## 2022-06-09 PROCEDURE — 80048 BASIC METABOLIC PNL TOTAL CA: CPT

## 2022-06-09 PROCEDURE — 97530 THERAPEUTIC ACTIVITIES: CPT

## 2022-06-09 PROCEDURE — 2580000003 HC RX 258: Performed by: ANESTHESIOLOGY

## 2022-06-09 PROCEDURE — 6370000000 HC RX 637 (ALT 250 FOR IP): Performed by: ORTHOPAEDIC SURGERY

## 2022-06-09 PROCEDURE — 99232 SBSQ HOSP IP/OBS MODERATE 35: CPT | Performed by: INTERNAL MEDICINE

## 2022-06-09 PROCEDURE — 6360000002 HC RX W HCPCS: Performed by: ORTHOPAEDIC SURGERY

## 2022-06-09 PROCEDURE — 85025 COMPLETE CBC W/AUTO DIFF WBC: CPT

## 2022-06-09 RX ADMIN — INSULIN GLARGINE 10 UNITS: 100 INJECTION, SOLUTION SUBCUTANEOUS at 21:24

## 2022-06-09 RX ADMIN — SENNOSIDES AND DOCUSATE SODIUM 1 TABLET: 50; 8.6 TABLET ORAL at 21:22

## 2022-06-09 RX ADMIN — POTASSIUM CHLORIDE 10 MEQ: 750 TABLET, EXTENDED RELEASE ORAL at 09:59

## 2022-06-09 RX ADMIN — INSULIN LISPRO 1 UNITS: 100 INJECTION, SOLUTION INTRAVENOUS; SUBCUTANEOUS at 10:00

## 2022-06-09 RX ADMIN — INSULIN LISPRO 2 UNITS: 100 INJECTION, SOLUTION INTRAVENOUS; SUBCUTANEOUS at 21:24

## 2022-06-09 RX ADMIN — OXYCODONE 10 MG: 5 TABLET ORAL at 21:22

## 2022-06-09 RX ADMIN — ACETAMINOPHEN 650 MG: 325 TABLET ORAL at 17:44

## 2022-06-09 RX ADMIN — ACETAMINOPHEN 650 MG: 325 TABLET ORAL at 05:34

## 2022-06-09 RX ADMIN — ENOXAPARIN SODIUM 40 MG: 100 INJECTION SUBCUTANEOUS at 09:59

## 2022-06-09 RX ADMIN — ACETAMINOPHEN 650 MG: 325 TABLET ORAL at 21:50

## 2022-06-09 RX ADMIN — SODIUM CHLORIDE, POTASSIUM CHLORIDE, SODIUM LACTATE AND CALCIUM CHLORIDE: 600; 310; 30; 20 INJECTION, SOLUTION INTRAVENOUS at 05:38

## 2022-06-09 RX ADMIN — LEVOTHYROXINE SODIUM 100 MCG: 100 TABLET ORAL at 05:34

## 2022-06-09 RX ADMIN — Medication 10 ML: at 21:22

## 2022-06-09 RX ADMIN — INSULIN LISPRO 3 UNITS: 100 INJECTION, SOLUTION INTRAVENOUS; SUBCUTANEOUS at 17:45

## 2022-06-09 RX ADMIN — OXYCODONE 10 MG: 5 TABLET ORAL at 05:34

## 2022-06-09 RX ADMIN — OXYCODONE 10 MG: 5 TABLET ORAL at 11:45

## 2022-06-09 RX ADMIN — SENNOSIDES AND DOCUSATE SODIUM 1 TABLET: 50; 8.6 TABLET ORAL at 09:59

## 2022-06-09 RX ADMIN — PANTOPRAZOLE SODIUM 40 MG: 40 TABLET, DELAYED RELEASE ORAL at 05:34

## 2022-06-09 RX ADMIN — INSULIN LISPRO 2 UNITS: 100 INJECTION, SOLUTION INTRAVENOUS; SUBCUTANEOUS at 12:23

## 2022-06-09 RX ADMIN — OXYCODONE 10 MG: 5 TABLET ORAL at 17:45

## 2022-06-09 ASSESSMENT — PAIN DESCRIPTION - LOCATION
LOCATION: HIP
LOCATION: HIP

## 2022-06-09 ASSESSMENT — PAIN DESCRIPTION - ORIENTATION
ORIENTATION: RIGHT
ORIENTATION: RIGHT

## 2022-06-09 ASSESSMENT — PAIN DESCRIPTION - FREQUENCY
FREQUENCY: CONTINUOUS
FREQUENCY: CONTINUOUS

## 2022-06-09 ASSESSMENT — PAIN SCALES - GENERAL
PAINLEVEL_OUTOF10: 7
PAINLEVEL_OUTOF10: 7
PAINLEVEL_OUTOF10: 9
PAINLEVEL_OUTOF10: 8

## 2022-06-09 ASSESSMENT — PAIN DESCRIPTION - DESCRIPTORS
DESCRIPTORS: ACHING
DESCRIPTORS: ACHING

## 2022-06-09 ASSESSMENT — PAIN - FUNCTIONAL ASSESSMENT: PAIN_FUNCTIONAL_ASSESSMENT: PREVENTS OR INTERFERES SOME ACTIVE ACTIVITIES AND ADLS

## 2022-06-09 ASSESSMENT — PAIN DESCRIPTION - PAIN TYPE
TYPE: SURGICAL PAIN
TYPE: SURGICAL PAIN

## 2022-06-09 ASSESSMENT — PAIN DESCRIPTION - ONSET
ONSET: ON-GOING
ONSET: ON-GOING

## 2022-06-09 NOTE — PLAN OF CARE
Problem: Pain  Goal: Verbalizes/displays adequate comfort level or baseline comfort level  Outcome: Progressing     Problem: Skin/Tissue Integrity  Goal: Absence of new skin breakdown  Description: 1. Monitor for areas of redness and/or skin breakdown  2. Assess vascular access sites hourly  3. Every 4-6 hours minimum:  Change oxygen saturation probe site  4. Every 4-6 hours:  If on nasal continuous positive airway pressure, respiratory therapy assess nares and determine need for appliance change or resting period.   Outcome: Progressing     Problem: Safety - Adult  Goal: Free from fall injury  Outcome: Progressing     Problem: Discharge Planning  Goal: Discharge to home or other facility with appropriate resources  Outcome: Progressing

## 2022-06-09 NOTE — PROGRESS NOTES
Department of Orthopedic Surgery  Physician Assistant   Progress Note    Subjective:     Post-Operative Day: 1 Status Post right Hip Hemiarthroplasty  Systemic or Specific Complaints:Pain Control    Objective:     Patient Vitals for the past 24 hrs:   BP Temp Temp src Pulse Resp SpO2 Height   06/09/22 0957 (!) 91/46 97.7 °F (36.5 °C) Oral 76 18 93 % --   06/09/22 0604 -- -- -- -- 18 -- --   06/09/22 0534 -- -- -- -- 18 -- --   06/09/22 0427 (!) 94/46 98 °F (36.7 °C) Oral 75 17 92 % --   06/09/22 0011 (!) 103/47 98 °F (36.7 °C) Oral 72 18 93 % --   06/08/22 1826 (!) 105/57 97.1 °F (36.2 °C) Oral 75 16 92 % --   06/08/22 1611 -- -- -- -- -- -- 5' 7\" (1.702 m)   06/08/22 1411 (!) 142/80 -- Oral 81 16 97 % --   06/08/22 1315 128/67 96.8 °F (36 °C) Oral 81 16 96 % --   06/08/22 1213 121/67 (!) 96.7 °F (35.9 °C) Oral 76 16 99 % --       General: alert, appears stated age and cooperative   Wound: Wound clean and dry no evidence of infection. , Drainage noted primarily distal aspect of incision and bloody in nature, Wound Intact and Positive for Edema   Motion: Painful range of Motion   DVT Exam: No evidence of DVT seen on physical exam.       NVI in lower extremity. Thigh swollen but soft. Moving foot and ankle. Data Review  CBC:   Lab Results   Component Value Date    WBC 5.7 06/09/2022    RBC 2.48 06/09/2022    HGB 9.2 06/09/2022    HCT 26.7 06/09/2022     06/09/2022       Assessment:     Status Post right Hip Hemiarthroplasty. Doing well postoperatively. Plan:      1: Continues current post-op course : dressing change to the right hip as needed for drainage.     2:  Continue Deep venous thrombosis prophylaxis  3:  Continue physical therapy  4:  Continue Pain Control

## 2022-06-09 NOTE — PLAN OF CARE
Problem: Pain  Goal: Verbalizes/displays adequate comfort level or baseline comfort level  6/9/2022 1944 by Fabrizio Humphreys RN  Outcome: Progressing  6/9/2022 1500 by Noah Alejandro RN  Outcome: Progressing     Problem: Skin/Tissue Integrity  Goal: Absence of new skin breakdown  Description: 1. Monitor for areas of redness and/or skin breakdown  2. Assess vascular access sites hourly  3. Every 4-6 hours minimum:  Change oxygen saturation probe site  4. Every 4-6 hours:  If on nasal continuous positive airway pressure, respiratory therapy assess nares and determine need for appliance change or resting period.   6/9/2022 1944 by Fabrizio Humphreys RN  Outcome: Progressing  6/9/2022 1500 by Noah Alejandro RN  Outcome: Progressing     Problem: Safety - Adult  Goal: Free from fall injury  6/9/2022 1944 by Fabrizio Humphreys RN  Outcome: Progressing  6/9/2022 1500 by Noah Alejandro RN  Outcome: Progressing     Problem: Chronic Conditions and Co-morbidities  Goal: Patient's chronic conditions and co-morbidity symptoms are monitored and maintained or improved  Outcome: Progressing     Problem: ABCDS Injury Assessment  Goal: Absence of physical injury  Outcome: Progressing     Problem: Nutrition Deficit:  Goal: Optimize nutritional status  Outcome: Progressing

## 2022-06-09 NOTE — PROGRESS NOTES
IM Progress Note    Admit Date:  6/7/2022    Right hip pain. Recent diagnosis of colon cancer and getting chemotherapy. X-ray was obtained at [facility] and noted with hip fracture and she was sent to the ED for evaluation. Denies any trauma to cause this. Patient is s/p total right hip arthroplasty  6/8   POD #1   Subjective:  Ms. Davidson Egan is sitting up on the chair. Working with therapy. Complains of leg pain. Blood pressures running low. Got IV fluids, hydrochlorothiazide held. Objective:   Patient Vitals for the past 4 hrs:   BP Temp Temp src Pulse Resp SpO2   06/09/22 1530 (!) 107/42 98.2 °F (36.8 °C) Oral 80 18 94 %            Intake/Output Summary (Last 24 hours) at 6/9/2022 1537  Last data filed at 6/9/2022 1232  Gross per 24 hour   Intake 2339.59 ml   Output --   Net 2339.59 ml       Physical Exam:  Gen: No distress. Alert. Awake and well-oriented  Eyes: PERRL. No sclera icterus. No conjunctival injection. ENT: No discharge. Pharynx clear. Neck: No JVD. Trachea midline. Resp: No accessory muscle use. No crackles. No wheezes. No rhonchi. CV: Regular rate. Regular rhythm. No murmur. No rub. No edema. Capillary Refill: Brisk,< 3 seconds   Peripheral Pulses: +2 palpable, equal bilaterally   GI: Non-tender. Non-distended. Normal bowel sounds. LUQ colostomy +  Skin: Warm and dry. No nodule on exposed extremities. No rash on exposed extremities. M/S: No cyanosis. No joint deformity. No clubbing. Right hip dressing  Neuro: Awake. Grossly nonfocal    Psych: Oriented x 3. No anxiety or agitation.        Scheduled Meds:   potassium chloride  10 mEq Oral Daily    sodium chloride flush  5-40 mL IntraVENous 2 times per day    sennosides-docusate sodium  1 tablet Oral BID    enoxaparin  40 mg SubCUTAneous Daily    acetaminophen  650 mg Oral Q6H    insulin glargine  10 Units SubCUTAneous Nightly    levothyroxine  100 mcg Oral Daily    pantoprazole  40 mg Oral QAM AC    insulin lispro  0-6 Units SubCUTAneous TID WC    insulin lispro  0-3 Units SubCUTAneous Nightly       Continuous Infusions:   lactated ringers 125 mL/hr at 06/09/22 0631    sodium chloride      dextrose         PRN Meds:  sodium chloride flush, sodium chloride, oxyCODONE **OR** oxyCODONE, HYDROmorphone **OR** HYDROmorphone, hydrOXYzine HCl, glucose, dextrose bolus **OR** dextrose bolus, glucagon (rDNA), dextrose, potassium chloride **OR** potassium alternative oral replacement **OR** potassium chloride, magnesium sulfate, ondansetron **OR** ondansetron, polyethylene glycol, acetaminophen **OR** acetaminophen      Data:  CBC:   Recent Labs     06/07/22  1513 06/08/22  0559 06/09/22  0609   WBC 4.1 4.1 5.7   HGB 10.8* 11.2* 9.2*   HCT 32.1* 32.6* 26.7*   .0* 107.0* 107.8*    210 191     BMP:   Recent Labs     06/07/22  1513 06/07/22  2143 06/08/22  0559 06/09/22  0609     --  142 137   K 2.9* 3.5 3.6 3.7     --  104 102   CO2 27  --  26 25   BUN 15  --  13 21*   CREATININE 0.9  --  0.7 0.8     LIVER PROFILE:   Recent Labs     06/07/22  1513   AST 37   ALT 17   BILITOT 0.8   ALKPHOS 120     PT/INR:   Recent Labs     06/07/22  1513   PROTIME 13.4   INR 1.04       CULTURES  COVID and Flu not detected       RADIOLOGY  XR PELVIS (1-2 VIEWS)   Final Result   Satisfactory postoperative appearance following total right hip arthroplasty. XR FEMUR RIGHT (MIN 2 VIEWS)   Final Result   Impacted right femoral neck fracture. No other acute right femur abnormality. XR TIBIA FIBULA RIGHT (2 VIEWS)   Final Result   No acute osseous abnormality of the right tib-fib. Osteopenia. Assessment/Plan:    #Impacted closed right femoral neck fracture   -hx of osteopenia   -possible pathological fracture  -orthopedic surgery consulted  -s/p right hip bipolar hemiarthroplasty  6/8. POD #1 .  - Bone biopsy sent out.   -PT OT initiated  -Dilaudid as needed for pain control     #Right knee pain   -Xray  - no acute osseous abnormalities      #Hypokalemia  #Hypomagnesemia  -replaced     #Chronic macrocytic anemia  -Hgb stable   -continue to monitor   -B12 and folate  WNL      #Colon cancer  -on chemotherapy  -S/p Exploratory laparotomy with diverting loop sigmoid colostomy  and peritoneal biopsy on 12/31. -follows with oncology outpatient     #H/O HTN   Hypotension now  -Continue IV fluid . DC HCTZ        #DM type 2  -continue lantus   -SSI   -continue to monitor      #CKD   -Cr stable 0.8  -continue to monitor      #Hypothyroidism   -continue synthroid      #GERD   #Hiatal hernia   -continue PPI     DVT Prophylaxis: Lovenox   Diet: ADULT DIET;  Regular  ADULT ORAL NUTRITION SUPPLEMENT; Breakfast; Low Calorie/High Protein Oral Supplement  Code Status: Full Code       Radha Nava MD   6/9/2022

## 2022-06-09 NOTE — PROGRESS NOTES
Occupational Therapy Daily Treatment Note    Unit: 2 [facility]  Date:  6/9/2022  Patient Name:    Tamika Betancourt  Admitting diagnosis:  Hypokalemia [E87.6]  Hypomagnesemia [E83.42]  Closed fracture of right hip, initial encounter [facility]) [S72.001A]  Closed right hip fracture, initial encounter ([facility] [address] [facility] Date:  6/7/2022  Precautions/Restrictions:  fall risk, IV, bed/chair alarm and telemetry, FWB R LE        Discharge Recommendations: SNF  DME needs for discharge: defer to facility       Therapy recommendations for staff:   Assist of 1 with use of rolling walker (RW) and gait belt for all transfers to/from BSC/chair    AM-PAC Score: AM-PAC Inpatient Daily Activity Raw Score: [address] S4 Level: NA       Treatment Time:  3744-2443  Treatment number:  2   Total Treatment Time:   39 minutes    History of Present Illness: Per Ron Chambers PA's 6/8/22 Note,pt is a [de-identified] y/o female with pmhx of colon cancer, CKD, GERD, HTN, hypothyroidism, HLD, Dm type 2 presented from [facility] for concern of pathological  hip fracture   -Labs remarkable for potassium 2.9, magnesium 1.3, blood glucose 186  -macrocytic anemia  -right hip xray with impacted right femoral neck fracture      6/8/22-RIGHT HIP BIPOLAR HEMIARTHROPLASTY    Subjective:  Pt was found in bed today. Was receptive to OT treatment with encouragement.   Reports increased pain today in her L hip    Pain   Yes  Rating: moderate  Location: R hip  Pain Medicine Status: Received pain med prior to tx, nurse notified    Bed Mobility:   Supine to Sit:  Min A  Sit to Supine:  Not Tested  Rolling:           Not Tested  Scooting:        Supervision    Transfer Training:   Sit to stand:   CGA  Stand to sit:  CGA  Bed to Chair:  CGA and with use of RW  Bed to [facility]:   Not Tested  Standard toilet:   Not Tested    Activity Tolerance   Pt completed therapy session with Pain noted with movement  BP = see below       BP (mmHg)  HR (bpm) SpO2 (%)   Semifowlers before activity  102/52 80 94   Seated EOB  104/52 80 95   Seated after transfer 101/46 80    Seated end of session 110/54 82 97       Balance  Sitting Balance :     Independent  Standing Balance   CGA    ADL Training:   Upper body dressing:  Not Tested  Upper body bathing:  Not Tested  Lower body dressing:  Not Tested  Lower body bathing:  Not Tested  Toileting:   Not Tested  Grooming/Hygiene:  Not Tested  Feeding :   Not Tested    Therapeutic Exercise:   N/A    Patient Education:   Role of OT  Recommendations for DC  Safe RW use/hand placement   Educated re: use of ice to decrease swelling and pain-ice pack place on R hip and pt was instructed to wear it for 10-15 mins. At a time with barrier against the skin. Positioning Needs:   Reclined in chair with call light and needs in reach. Family Present:  No    Assessment: Pt was reluctant to move today due to increased pain. After instruction took place re: importance of movement, she did transfer to the chair well, with some drop in BP. No symptoms were reported. RN and MD were notified. Pt should continue to benefit from skilled OT tx to maximize independence so that she may eventually return home with the least amount of assistance. Recommending SNF upon discharge as patient functioning well below baseline, demonstrates good rehab potential and unable to return home due to limited or no family support, inability to negotiate stairs to enter home/bedroom/bathroom and home environment not conducive to patient recovery. GOALS  To be met in 3 Visits:  1). Bed to toilet/BSC: Supervision     To be met in 5 Visits:  1). Supine to/from Sit:             Supervision  2). Upper Body Bathing:         Independent  3). Lower Body Bathing:         Independent  4). Upper Body Dressing:       Independent  5). Lower Body Dressing:       Independent with AE  6).  Pt to demonstrate UE exs x 15 reps with minimal cues      Plan: cont with [facility] [address], OTR/L  #[id number]        If patient discharges from this facility prior to next visit, this note will serve as the Discharge Summary

## 2022-06-09 NOTE — PLAN OF CARE
RN  Outcome: Progressing     Problem: Safety - Adult  Goal: Free from fall injury  6/8/2022 1554 by Anant Marks RN  Outcome: Progressing     Problem: Chronic Conditions and Co-morbidities  Goal: Patient's chronic conditions and co-morbidity symptoms are monitored and maintained or improved  Recent Flowsheet Documentation  Taken 6/8/2022 2034 by Ari Ballard 34 - Patient's Chronic Conditions and Co-Morbidity Symptoms are Monitored and Maintained or Improved: Monitor and assess patient's chronic conditions and comorbid symptoms for stability, deterioration, or improvement  6/8/2022 1554 by Anant Marks RN  Outcome: Progressing  Flowsheets (Taken 6/8/2022 1115)  Care Plan - Patient's Chronic Conditions and Co-Morbidity Symptoms are Monitored and Maintained or Improved:   Monitor and assess patient's chronic conditions and comorbid symptoms for stability, deterioration, or improvement   Collaborate with multidisciplinary team to address chronic and comorbid conditions and prevent exacerbation or deterioration   Update acute care plan with appropriate goals if chronic or comorbid symptoms are exacerbated and prevent overall improvement and discharge

## 2022-06-09 NOTE — PROGRESS NOTES
Patient A&Ox4. VSS with exception to soft BP, pt asymptomatic. Surgical dressing soaked through this morning, new dressing applied. Pain managed per MAR. Patient tolerating diet well, denies nausea. Patient up to the chair with therapy, required assist x1 with walker. Patient anticipating discharge to SNF once precert obtained. Will continue to monitor.

## 2022-06-10 VITALS
BODY MASS INDEX: 24.14 KG/M2 | SYSTOLIC BLOOD PRESSURE: 119 MMHG | HEART RATE: 79 BPM | OXYGEN SATURATION: 95 % | HEIGHT: 67 IN | TEMPERATURE: 97.5 F | WEIGHT: 153.8 LBS | DIASTOLIC BLOOD PRESSURE: 60 MMHG | RESPIRATION RATE: 18 BRPM

## 2022-06-10 LAB
ANION GAP SERPL CALCULATED.3IONS-SCNC: 9 MMOL/L (ref 3–16)
BASOPHILS ABSOLUTE: 0 K/UL (ref 0–0.2)
BASOPHILS RELATIVE PERCENT: 0.3 %
BUN BLDV-MCNC: 19 MG/DL (ref 7–20)
CALCIUM SERPL-MCNC: 8 MG/DL (ref 8.3–10.6)
CHLORIDE BLD-SCNC: 100 MMOL/L (ref 99–110)
CO2: 27 MMOL/L (ref 21–32)
CREAT SERPL-MCNC: 0.7 MG/DL (ref 0.6–1.2)
EOSINOPHILS ABSOLUTE: 0 K/UL (ref 0–0.6)
EOSINOPHILS RELATIVE PERCENT: 0.8 %
GFR AFRICAN AMERICAN: >60
GFR NON-AFRICAN AMERICAN: >60
GLUCOSE BLD-MCNC: 162 MG/DL (ref 70–99)
GLUCOSE BLD-MCNC: 185 MG/DL (ref 70–99)
GLUCOSE BLD-MCNC: 196 MG/DL (ref 70–99)
GLUCOSE BLD-MCNC: 284 MG/DL (ref 70–99)
HCT VFR BLD CALC: 25.2 % (ref 36–48)
HEMOGLOBIN: 8.4 G/DL (ref 12–16)
LYMPHOCYTES ABSOLUTE: 1.1 K/UL (ref 1–5.1)
LYMPHOCYTES RELATIVE PERCENT: 24.6 %
MCH RBC QN AUTO: 36.4 PG (ref 26–34)
MCHC RBC AUTO-ENTMCNC: 33.4 G/DL (ref 31–36)
MCV RBC AUTO: 108.9 FL (ref 80–100)
MONOCYTES ABSOLUTE: 0.8 K/UL (ref 0–1.3)
MONOCYTES RELATIVE PERCENT: 16.5 %
NEUTROPHILS ABSOLUTE: 2.7 K/UL (ref 1.7–7.7)
NEUTROPHILS RELATIVE PERCENT: 57.8 %
PDW BLD-RTO: 15.2 % (ref 12.4–15.4)
PERFORMED ON: ABNORMAL
PLATELET # BLD: 164 K/UL (ref 135–450)
PMV BLD AUTO: 7.5 FL (ref 5–10.5)
POTASSIUM REFLEX MAGNESIUM: 3.6 MMOL/L (ref 3.5–5.1)
RBC # BLD: 2.31 M/UL (ref 4–5.2)
SARS-COV-2, NAAT: NOT DETECTED
SODIUM BLD-SCNC: 136 MMOL/L (ref 136–145)
WBC # BLD: 4.6 K/UL (ref 4–11)

## 2022-06-10 PROCEDURE — 97116 GAIT TRAINING THERAPY: CPT

## 2022-06-10 PROCEDURE — 87635 SARS-COV-2 COVID-19 AMP PRB: CPT

## 2022-06-10 PROCEDURE — 6370000000 HC RX 637 (ALT 250 FOR IP): Performed by: ORTHOPAEDIC SURGERY

## 2022-06-10 PROCEDURE — 6360000002 HC RX W HCPCS: Performed by: ORTHOPAEDIC SURGERY

## 2022-06-10 PROCEDURE — 80048 BASIC METABOLIC PNL TOTAL CA: CPT

## 2022-06-10 PROCEDURE — 36415 COLL VENOUS BLD VENIPUNCTURE: CPT

## 2022-06-10 PROCEDURE — 97530 THERAPEUTIC ACTIVITIES: CPT

## 2022-06-10 PROCEDURE — 99239 HOSP IP/OBS DSCHRG MGMT >30: CPT | Performed by: INTERNAL MEDICINE

## 2022-06-10 PROCEDURE — 85025 COMPLETE CBC W/AUTO DIFF WBC: CPT

## 2022-06-10 RX ORDER — POTASSIUM CHLORIDE 750 MG/1
10 TABLET, EXTENDED RELEASE ORAL DAILY
COMMUNITY
Start: 2022-06-11 | End: 2022-06-30 | Stop reason: SDUPTHER

## 2022-06-10 RX ORDER — ENOXAPARIN SODIUM 100 MG/ML
40 INJECTION SUBCUTANEOUS DAILY
DISCHARGE
Start: 2022-06-11 | End: 2022-07-01

## 2022-06-10 RX ORDER — SENNA AND DOCUSATE SODIUM 50; 8.6 MG/1; MG/1
1 TABLET, FILM COATED ORAL 2 TIMES DAILY
DISCHARGE
Start: 2022-06-10 | End: 2022-06-30 | Stop reason: CLARIF

## 2022-06-10 RX ORDER — OXYCODONE AND ACETAMINOPHEN 10; 325 MG/1; MG/1
1 TABLET ORAL EVERY 4 HOURS PRN
Qty: 10 TABLET | Refills: 0 | Status: SHIPPED | OUTPATIENT
Start: 2022-06-10 | End: 2022-06-15

## 2022-06-10 RX ORDER — POLYETHYLENE GLYCOL 3350 17 G/17G
17 POWDER, FOR SOLUTION ORAL DAILY PRN
DISCHARGE
Start: 2022-06-10 | End: 2022-06-30 | Stop reason: CLARIF

## 2022-06-10 RX ADMIN — POTASSIUM CHLORIDE 10 MEQ: 750 TABLET, EXTENDED RELEASE ORAL at 08:40

## 2022-06-10 RX ADMIN — ACETAMINOPHEN 650 MG: 325 TABLET ORAL at 05:00

## 2022-06-10 RX ADMIN — INSULIN LISPRO 3 UNITS: 100 INJECTION, SOLUTION INTRAVENOUS; SUBCUTANEOUS at 12:03

## 2022-06-10 RX ADMIN — OXYCODONE 10 MG: 5 TABLET ORAL at 05:00

## 2022-06-10 RX ADMIN — SENNOSIDES AND DOCUSATE SODIUM 1 TABLET: 50; 8.6 TABLET ORAL at 08:40

## 2022-06-10 RX ADMIN — PANTOPRAZOLE SODIUM 40 MG: 40 TABLET, DELAYED RELEASE ORAL at 05:00

## 2022-06-10 RX ADMIN — INSULIN LISPRO 1 UNITS: 100 INJECTION, SOLUTION INTRAVENOUS; SUBCUTANEOUS at 08:40

## 2022-06-10 RX ADMIN — LEVOTHYROXINE SODIUM 100 MCG: 100 TABLET ORAL at 05:00

## 2022-06-10 RX ADMIN — ENOXAPARIN SODIUM 40 MG: 100 INJECTION SUBCUTANEOUS at 08:40

## 2022-06-10 RX ADMIN — OXYCODONE 5 MG: 5 TABLET ORAL at 10:49

## 2022-06-10 ASSESSMENT — PAIN DESCRIPTION - ONSET: ONSET: ON-GOING

## 2022-06-10 ASSESSMENT — PAIN SCALES - GENERAL
PAINLEVEL_OUTOF10: 3
PAINLEVEL_OUTOF10: 4
PAINLEVEL_OUTOF10: 8
PAINLEVEL_OUTOF10: 4

## 2022-06-10 ASSESSMENT — PAIN - FUNCTIONAL ASSESSMENT: PAIN_FUNCTIONAL_ASSESSMENT: PREVENTS OR INTERFERES SOME ACTIVE ACTIVITIES AND ADLS

## 2022-06-10 ASSESSMENT — PAIN DESCRIPTION - ORIENTATION: ORIENTATION: RIGHT

## 2022-06-10 ASSESSMENT — PAIN DESCRIPTION - PAIN TYPE: TYPE: SURGICAL PAIN

## 2022-06-10 ASSESSMENT — PAIN DESCRIPTION - LOCATION: LOCATION: HIP

## 2022-06-10 ASSESSMENT — PAIN DESCRIPTION - DESCRIPTORS: DESCRIPTORS: ACHING

## 2022-06-10 ASSESSMENT — PAIN DESCRIPTION - FREQUENCY: FREQUENCY: CONTINUOUS

## 2022-06-10 NOTE — DISCHARGE INSTR - COC
Continuity of Care Form    Patient Name: Shreyas Null   :  1940  MRN:  [id number]    Admit date:  2022  Discharge date:  6/10/22    Code Status Order: Full Code   Advance Directives:   Advance Care Flowsheet Documentation       Date/Time Healthcare Directive Type of Healthcare Directive Copy in [address] Agent's Name Healthcare Agent's Phone Number    [phone] No, patient does not have an advance directive for healthcare treatment -- -- -- -- --            Admitting Physician:  Otto Lim MD  PCP: PILY Dimas - CNP    Discharging Nurse: [facility] Unit/Room#: 0208/0208-01  Discharging Unit Phone Number: [phone]    Emergency Contact:   Extended Emergency Contact Information  Primary Emergency Contact: CristiPino  Address: [address] [place] [address]           ON VENTILATOR/NURSE [address] [place] [address], [address] [place] [address] Phone: [phone]  Relation: Child    Past Surgical History:  Past Surgical History:   Procedure Laterality Date    ANKLE FRACTURE SURGERY Right     [address] Right 2018     PHACO EMULSIFICATION OF CATARACT WITH INTRA OCULAR LENS IMPLANT RIGHT EYE    CATARACT REMOVAL WITH IMPLANT Left 2018    COLOSTOMY N/A 2021    EXPLORATORY LAPAROTOMY, DIVERTING LOOP COLOSTOMY, PERITONEAL BIOPSY performed by Preethi Suarez MD at [address] [place] [address] Right 2022    RIGHT HIP BIPOLAR HEMIARTHROPLASTY performed by Tiffanie William MD at [facility] ([address] [place] [address])      KNEE ARTHROSCOPY Right     PORT SURGERY Left 2022    [facility] Port placement    PORT SURGERY N/A 2022    PORT INSERTION performed by Jessica Guardado MD at [facility]       Immunization History:   Immunization History   Administered Date(s) Administered    COVID-19, Pfizer Purple top, DILUTE for use, 12+ yrs, 30mcg/0.3mL dose 05/21/2021, 06/11/2021    Influenza Virus Vaccine 09/25/2012    Influenza, High Dose (Fluzone 65 yrs and older) 11/06/2014, 11/10/2016    Influenza, Quadv, IM, (6 mo and older Fluzone, Flulaval, Fluarix and 3 yrs and older Afluria) 10/18/2018    Influenza, Quadv, IM, PF (6 mo and older Fluzone, Flulaval, Fluarix, and 3 yrs and older Afluria) 10/25/2017, 10/25/2017, 10/10/2019    Influenza, Nelly Broody, Recombinant, IM PF (Flublok 18 yrs and older) 10/21/2020    Pneumococcal Conjugate 13-valent ([id number]) 12/08/2017    Pneumococcal Polysaccharide ([id number]) 03/10/2016       Active Problems:  Patient Active Problem List   Diagnosis Code    Type 2 diabetes mellitus with hyperglycemia ([facility] [address] E11.65    Essential hypertension I10    Hypothyroidism E03.9    Family history of early CAD Z82.49    Mixed hyperlipidemia E78.2    Precordial pain R07.2    Statin intolerance Z78.9    Thyrotoxicosis E05.90    Chronic pain of right knee M25.561, G89.29    Heart murmur R01.1    Vitamin D deficiency E55.9    Iron deficiency anemia D50.9    CKD (chronic kidney disease) stage 3, GFR 30-59 ml/min ([facility]) N18.30    Abnormal weight loss R63.4    Gastroesophageal reflux disease with esophagitis K21.00    SBO (small bowel obstruction) ([facility]) K56.609    Hypokalemia E87.6    Colonic mass K63.89    Carcinomatosis (HCC) C80.0    Moderate protein-calorie malnutrition (HCC) E44.0    Malignant neoplasm of sigmoid colon (HCC) C18.7    Closed fracture of right hip ([facility] [address] S72.001A    Hypomagnesemia E83.42       Isolation/Infection:   Isolation            No Isolation          Patient Infection Status       Infection Onset Added Last Indicated Last Indicated By Review Planned Expiration Resolved Resolved By    None active    Resolved    COVID-19 (Rule Out) 06/07/22 06/07/22 06/07/22 COVID-19 & Influenza Combo (Ordered)   06/07/22 Rule-Out Test Resulted    COVID-19 (Rule Out) 01/27/22 01/27/22 01/27/22 COVID-19 (Ordered)   01/27/22 Rule-Out Test Resulted    COVID-19 (Rule Out) 12/30/21 12/30/21 12/30/21 COVID-19 & Influenza Combo (Ordered)   12/30/21 Rule-Out Test Resulted            Nurse Assessment:  Last Vital Signs: BP (!) 102/55   Pulse 79   Temp 97.9 °F (36.6 °C) (Oral)   Resp 18   Ht 5' 7\" (1.702 m)   Wt 153 lb 12.8 oz (69.8 kg)   SpO2 92%   BMI 24.09 kg/m²     Last documented pain score (0-10 scale): Pain Level: 3  Last Weight:   Wt Readings from Last 1 Encounters:   06/07/22 153 lb 12.8 oz (69.8 kg)     Mental Status:  oriented and alert    IV Access:  - None    Nursing Mobility/ADLs:  Walking   Assisted  Transfer  Assisted  Bathing  Assisted  Dressing  Assisted  Toileting  Assisted  Feeding  Independent  Med Admin  Assisted  Med Delivery   whole    Wound Care Documentation and Therapy:  Incision 06/08/22 Thigh Anterior;Proximal;Right (Active)   Dressing Status Intact; Old drainage noted;New dressing applied 06/09/22 2107   Dressing/Treatment Adhesive bandage 06/09/22 2107   Closure Surgical glue 06/09/22 2107   Drainage Amount Large 06/09/22 0718   Odor None 06/09/22 2107   Number of days: 2       Incision 12/31/21 Abdomen Medial (Active)   Number of days: 160       Incision 01/31/22 Chest Left;Upper (Active)   Number of days: 130        Elimination:  Continence: Bowel: Yes  Bladder: Yes  Urinary Catheter: None   Colostomy/Ileostomy/Ileal Conduit: Yes  Colostomy LLQ Loop-Stomal Appliance: 2 piece  Colostomy LLQ Loop-Stoma  Assessment: Red  Colostomy LLQ Loop-Peristomal Assessment: Clean, dry & intact  Colostomy LLQ Loop-Stool Appearance: Soft  Colostomy LLQ Loop-Stool Color: Brown  Colostomy LLQ Loop-Stool Amount: Smear    Date of Last BM: na    Intake/Output Summary (Last 24 hours) at 6/10/2022 1048  Last data filed at 6/9/2022 2122  Gross per 24 hour   Intake 250 ml   Output --   Net 250 ml     I/O last 3 completed shifts: In: 2229.6 [P.O.:240; I.V.:1989.6]  Out: -     Safety Concerns:      At Risk for Falls    Impairments/Disabilities:      None    Nutrition Therapy:  Current Nutrition Therapy:   - Oral Diet:  Carb Control 3 carbs/meal (1500kcals/day)    Routes of Feeding: Oral  Liquids: No Restrictions  Daily Fluid Restriction: no  Last Modified Barium Swallow with Video (Video Swallowing Test): not done    Treatments at the Time of Hospital Discharge:   Respiratory Treatments: n/a  Oxygen Therapy:  is not on home oxygen therapy. Ventilator:    - No ventilator support    Rehab Therapies: Physical Therapy and Occupational Therapy  Weight Bearing Status/Restrictions: No weight bearing restrictions  Other Medical Equipment (for information only, NOT a DME order):  walker  Other Treatments: na    Patient's personal belongings (please select all that are sent with patient):  Glasses    RN SIGNATURE:  Electronically signed by Pipe Auguste RN on 6/10/22 at 1:48 PM EDT    CASE MANAGEMENT/SOCIAL WORK SECTION    Inpatient Status Date: 6/7/22    Readmission Risk Assessment Score:  Readmission Risk              Risk of Unplanned Readmission:  27           Discharging to Facility/ Agency   Name: [facility]  Phone: [phone]        / signature: Electronically signed by Cherise Dillon RN on 6/10/22 at 12:25 PM EDT    PHYSICIAN SECTION    Prognosis: Good    Condition at Discharge: Stable    Rehab Potential (if transferring to Rehab): Good    Recommended Labs or Other Treatments After Discharge:     Physician Certification: I certify the above information and transfer of Symone Turpin  is necessary for the continuing treatment of the diagnosis listed and that she requires [facility] for less 30 days.      Update Admission H&P: No change in H&P    PHYSICIAN SIGNATURE:  Electronically signed by Chris Zhu MD on 6/10/22 at 10:48 AM EDT

## 2022-06-10 NOTE — FLOWSHEET NOTE
06/10/22 0830   Vital Signs   Temp 97.9 °F (36.6 °C)   Temp Source Oral   Heart Rate 79   Heart Rate Source Monitor   Resp 18   BP (!) 102/55   BP Location Right upper arm   BP Method Automatic   MAP (Calculated) 70.67   Patient Position Semi fowlers   Level of Consciousness Alert (0)   MEWS Score 1   Pain Assessment   Pain Assessment 0-10   Pain Level 3   Care Plan - Pain Goals   Verbalizes/displays adequate comfort level or baseline comfort level Encourage patient to monitor pain and request assistance   Opioid-Induced Sedation   POSS Score 1   Oxygen Therapy   SpO2 92 %   O2 Device None (Room air)   Shift assessment complete. See flow sheet. Scheduled medications given, See MAR. Head to toe complete. Vital signs logged and active bowel sounds in all 4 quadrants. Pt awake in bed. Medications taken without difficulty. Pt has dressing in place to R hip. Colostomy in place LUQ. No further needs noted at this time. Call light and bedside table within reach. Bed in lowest position, wheels locked and side rails up x2.      Malik Gonzalez RN

## 2022-06-10 NOTE — CARE COORDINATION
DISCHARGE ORDER  Date/Time 6/10/2022 1:01 PM  Completed by: Kemi Baker, RN, Case Management    Patient Name: Akash Thomas    : 1940      Admit order Date and Status: 22 inpt  Noted discharge order. (verify MD's last order for status of admission/Traditional Medicare 3 [place] Inpatient qualifying stay required for SNF)    Confirmed discharge plan with:              Patient:  Yes              When pt confirms DC plan does any support person need to be contacted by CM No                Discharge to Facility: [facility] phone number for staff giving report: [phone]   Pre-certification completed: [address] Exemption Notification (HENS) completed: Yes   Discharge orders and Continuity of Care faxed to facility:   facility will pull from [facility]     Transportation:               Medical Transport explained with choice list offered to pt/family. Choice:(no preference)  Agency used: [employer]   time:   14:00     Pt/family/Nursing/Facility aware of  time: Yes Names: Kacie Weir, pt, pt Son and [place] at [facility] form completed:  Yes     Comments: Order for dc noted. Spoke with pt who cont plan to go to to [facility]. Spoke with Tangela at [facility] [address] who can accept today for rehab. Chart reviewed and no other dc needs identified. Pt is being d/c'd to [facility] (SNF) today. Pt's O2 sats are 95% on RA. Discharge timeout done with  Nsg, CM and pt. All discharge needs and concerns addressed. Discharging nurse to complete CASANDRA, reconcile AVS, and place final copy with patient's discharge packet. Discharging RN to ensure that written prescriptions for  Level II medications are sent with patient to the facility as per protocol.

## 2022-06-10 NOTE — PLAN OF CARE
Problem: Discharge Planning  Goal: Discharge to home or other facility with appropriate resources  Outcome: Progressing  Flowsheets (Taken 6/10/2022 0836)  Discharge to home or other facility with appropriate resources: Identify barriers to discharge with patient and caregiver     Problem: Pain  Goal: Verbalizes/displays adequate comfort level or baseline comfort level  Outcome: Progressing  Flowsheets (Taken 6/10/2022 0830)  Verbalizes/displays adequate comfort level or baseline comfort level: Encourage patient to monitor pain and request assistance     Problem: Skin/Tissue Integrity  Goal: Absence of new skin breakdown  Description: 1. Monitor for areas of redness and/or skin breakdown  2. Assess vascular access sites hourly  3. Every 4-6 hours minimum:  Change oxygen saturation probe site  4. Every 4-6 hours:  If on nasal continuous positive airway pressure, respiratory therapy assess nares and determine need for appliance change or resting period.   Outcome: Progressing     Problem: Safety - Adult  Goal: Free from fall injury  Outcome: Progressing  Flowsheets (Taken 6/10/2022 1118)  Free From Fall Injury: Himanshu Hernandez family/caregiver on patient safety     Problem: Chronic Conditions and Co-morbidities  Goal: Patient's chronic conditions and co-morbidity symptoms are monitored and maintained or improved  Outcome: Progressing  Flowsheets (Taken 6/10/2022 0836)  Care Plan - Patient's Chronic Conditions and Co-Morbidity Symptoms are Monitored and Maintained or Improved: Monitor and assess patient's chronic conditions and comorbid symptoms for stability, deterioration, or improvement     Problem: ABCDS Injury Assessment  Goal: Absence of physical injury  Outcome: Progressing  Flowsheets (Taken 6/10/2022 1118)  Absence of Physical Injury: Implement safety measures based on patient assessment     Problem: Nutrition Deficit:  Goal: Optimize nutritional status  Outcome: Progressing

## 2022-06-10 NOTE — DISCHARGE SUMMARY
Name:  Jesus Mckoy  Room:  0208/0208-01  MRN:    [id number]    Discharge Summary      This discharge summary is in conjunction with a complete physical exam done on the day of discharge. Attending Physician: Dr. Bhumika Humphreys  Discharging Physician: Dr. Trinity Stuart: 6/7/2022  Discharge:   6/10/2022    HPI:    The patient is a [de-identified] y.o. female with pmhx of colon cancer, CKD, HTN, hypothyroidism, GERD, HLD, DM type 2 who presented to [facility] ED from [facility] for concern for right hip pain, xray there confirmed hip fracture. Patient reports that over the past couple of weeks since she started chemotherapy she has noticed some bilateral lower extremity edema and bilateral hip pain. The swelling improved with HCTZ but she still had some in her right hip and ankle. A Couple days ago she then developed some difficulty sitting down on the toilet and extending her right leg, reports she had increased pain with this. Did not take anything for the pain. She was still able to ambulate. She uses a walker at home, She went to [facility] for oncology apt where they conducted a right hip xray and found that the hip was fractured and sent her to the ED. Patient is now also complaining of some right knee pain. She denies any recent injury or trauma. No falls. No fever, chills, chest pain, dyspnea, nausea, vomiting, diarrhea, syncope, dizziness. Diagnoses this Admission and Hospital Course;  Right hip pain. Recent diagnosis of colon cancer and getting chemotherapy. X-ray was obtained at [facility] and noted with hip fracture and she was sent to the ED for evaluation. Denies any trauma to cause this. Patient is s/p total right hip arthroplasty. #Impacted closed right femoral neck fracture   -hx of osteopenia   -possible pathological fracture  -orthopedic surgery consulted  -s/p right hip bipolar hemiarthroplasty  6/8. POD #2.  - Bone biopsy sent out. results  Pending -.  F/u with [facility]  -PT OT initiated  -Dilaudid as needed/ norco  for pain control  - DC to SNF     #Right knee pain   -Xray  - no acute osseous abnormalities      #Hypokalemia  #Hypomagnesemia  -replaced      #Chronic macrocytic anemia  -Hgb stable   -continue to monitor   -B12 and folate  WNL      #Colon cancer  -on chemotherapy  -S/p Exploratory laparotomy with diverting loop sigmoid colostomy  and peritoneal biopsy on 12/31. -follows with oncology outpatient     #H/O HTN   Hypotension now  -Continued IV fluid . DC HCTZ    BP stable      #DM type 2  -continued lantus   -SSI   -continued to monitor      #CKD   -Cr stable 0.8  -continued to monitor      #Hypothyroidism   -continued synthroid      #GERD   #Hiatal hernia   -continued PPI      DVT Prophylaxis: Lovenox      Postop day 2 today  Patient stable. Can discharge to SNF    Procedures (Please Review Full Report for Details)  RIGHT HIP BIPOLAR HEMIARTHROPLASTY    Consults    Orthopedic surgery      Physical Exam at Discharge:    /60   Pulse 79   Temp 97.5 °F (36.4 °C) (Oral)   Resp 18   Ht 5' 7\" (1.702 m)   Wt 153 lb 12.8 oz (69.8 kg)   SpO2 95%   BMI 24.09 kg/m²   Gen: No distress. Alert. Awake and well-oriented  Eyes: PERRL. No sclera icterus. No conjunctival injection. ENT: No discharge. Pharynx clear. Neck: No JVD. Trachea midline. Resp: No accessory muscle use. No crackles. No wheezes. No rhonchi. CV: Regular rate. Regular rhythm. No murmur. No rub. No edema. Capillary Refill: Brisk,< 3 seconds   Peripheral Pulses: +2 palpable, equal bilaterally   GI: Non-tender. Non-distended. Normal bowel sounds. LUQ colostomy +  Skin: Warm and dry. No nodule on exposed extremities. No rash on exposed extremities. M/S: No cyanosis. No joint deformity. No clubbing. Right hip dressing  Neuro: Awake. Grossly nonfocal    Psych: Oriented x 3. No anxiety or agitation.     CBC: Recent Labs     06/08/22  0559 06/09/22  0609 06/10/22  0453   WBC 4.1 5.7 4.6   HGB 11.2* 9.2* 8.4*   HCT 32.6* 26.7* 25.2*   .0* 107.8* 108.9*    191 164     BMP:   Recent Labs     06/08/22  0559 06/09/22  0609 06/10/22  0453    137 136   K 3.6 3.7 3.6    102 100   CO2 26 25 27   BUN 13 21* 19   CREATININE 0.7 0.8 0.7     LIVER PROFILE:   Recent Labs     06/07/22  1513   AST 37   ALT 17   BILITOT 0.8   ALKPHOS 120     PT/INR:   Recent Labs     06/07/22  1513   PROTIME 13.4   INR 1.04       U/A:    Lab Results   Component Value Date    NITRITE neg 07/20/2021    SPECGRAV 1.020 07/20/2021    LEUKOCYTESUR neg 07/20/2021    BLOODU moderate 07/20/2021    GLUCOSEU >=1000 mg 07/20/2021         CULTURES  COVID and Flu not detected     RADIOLOGY  XR PELVIS (1-2 VIEWS)   Final Result   Satisfactory postoperative appearance following total right hip arthroplasty. XR FEMUR RIGHT (MIN 2 VIEWS)   Final Result   Impacted right femoral neck fracture. No other acute right femur abnormality. XR TIBIA FIBULA RIGHT (2 VIEWS)   Final Result   No acute osseous abnormality of the right tib-fib. Osteopenia. Discharge Medications     Medication List      START taking these medications    enoxaparin 40 MG/0.4ML  Commonly known as: LOVENOX  Inject 0.4 mLs into the skin daily for 20 days  Start taking on: June 11, 2022     oxyCODONE-acetaminophen  MG per tablet  Commonly known as: Percocet  Take 1 tablet by mouth every 4 hours as needed for Pain for up to 5 days.  Intended supply: 30 days     polyethylene glycol 17 g packet  Commonly known as: GLYCOLAX  Take 17 g by mouth daily as needed for Constipation     sennosides-docusate sodium 8.6-50 MG tablet  Commonly known as: SENOKOT-S  Take 1 tablet by mouth 2 times daily        CHANGE how you take these medications    potassium chloride 10 MEQ extended release tablet  Commonly known as: Aleatha Rachel  Start taking on: June 11, 2022  What changed:   · how much to take  · how to take this  · when to take this  · additional instructions        CONTINUE taking these medications    Alpha-Lipoic Acid 600 MG Caps     blood glucose test strips strip  Commonly known as: ASCENSIA AUTODISC VI;ONE TOUCH ULTRA TEST VI  1 each by In Vitro route daily As needed. Ensure Clear Liqd  Drink one clear ensure daily     glipiZIDE 5 MG extended release tablet  Commonly known as: GLUCOTROL XL  TAKE 1 TABLET BY MOUTH DAILY     [employer] [place] 29G 29G X 12MM Misc  Generic drug: Insulin Pen Needle  1 each by Does not apply route daily     Lantus SoloStar 100 UNIT/ML injection pen  Generic drug: insulin glargine  INJECT 10 UNITS INTO THE SKIN NIGHTLY     levothyroxine 100 MCG tablet  Commonly known as: SYNTHROID  TAKE 1 TABLET BY MOUTH DAILY     omeprazole 40 MG delayed release capsule  Commonly known as: PRILOSEC  TAKE 1 CAPSULE BY MOUTH EVERY MORNING (BEFORE BREAKFAST)     vitamin B-6 100 MG tablet  Commonly known as: PYRIDOXINE        STOP taking these medications    hydroCHLOROthiazide 25 MG tablet  Commonly known as: HYDRODIURIL           Where to Get Your Medications      You can get these medications from any pharmacy    Bring a paper prescription for each of these medications  · oxyCODONE-acetaminophen  MG per tablet     Information about where to get these medications is not yet available    Ask your nurse or doctor about these medications  · enoxaparin 40 MG/0.4ML  · polyethylene glycol 17 g packet  · sennosides-docusate sodium 8.6-50 MG tablet           Discharged in stable condition to SNF. Total time 35 minutes. > 50%  dominated by counseling and coordination of care. Follow Up: Follow up with PCP.        Andre Ramos MD

## 2022-06-10 NOTE — PROGRESS NOTES
Inpatient Physical Therapy Daily Treatment Note    Unit: [address]  Date:  6/10/2022  Patient Name:    Silas Sanchez  Admitting diagnosis:  Hypokalemia [E87.6]  Hypomagnesemia [E83.42]  Closed fracture of right hip, initial encounter [facility]) [S72.001A]  Closed right hip fracture, initial encounter ([facility] [address] [facility]  Admit Date:  6/7/2022  Precautions/Restrictions:  Fall risk, Bed/chair alarm, Lines -IV, Telemetry and WB Restrictions (FWB RLE)      Discharge Recommendations: SNF  DME needs for discharge: defer to facility       Therapy recommendation for EMS Transport: can transport by wheelchair    Therapy recommendations for staff:   Assist of 1 with use of rolling walker (RW) and gait belt for all transfers and ambulation to/from [facility]  to/from chair    History of Present Illness: Per Danica VARGAS's 6/8/22 Note,pt is [de-identified] y/o female with pmhx of colon cancer, CKD, GERD, HTN, hypothyroidism, HLD, Dm type 2 presented from [facility] for concern of pathological  hip fracture   -Labs remarkable for potassium 2.9, magnesium 1.3, blood glucose 186  -macrocytic anemia  -right hip xray with impacted right femoral neck fracture      6/8/22-RIGHT HIP BIPOLAR HEMIARTHROPLASTY    Home Health S4 Level Recommendation: NA  AM-PAC Mobility Score   AM-PAC Inpatient Mobility without Stair Climbing Raw Score : 14    Treatment Time:  11:25 - 11:55  Treatment number: 2  Timed Code Treatment Minutes: 30 minutes  Total Treatment Minutes:  30  minutes    Cognition    A&O x4   Able to follow 2 step commands    Subjective  Patient lying supine in bed with no family present   Pt agreeable to this PT tx.      Pain   Yes  Location: R hip and thigh  Rating:    3/10 at rest, up to 7/10 with movement  Pain Medicine Status: Received pain med prior to tx - coordinated with RN for pt to receive pain meds 45 min prior to tx    Bed Mobility   Supine to Sit:    Mod A   Sit to Supine:   Not Tested  Rolling:   Not Tested  Scooting:   CGA    Transfer Training Sit to stand:   Min A   Stand to sit:   Min A   Bed to Chair:   Not Tested with use of N/A    Gait Training gait completed as indicated below  Distance:      20 ft + 5 ft  Deviations (firm surface/linoleum):  decreased aundrea, narrow AMY, forward flexed posture, step to pattern and decreased stance time on right  Assistive Device Used:    gait belt and rolling walker (RW)  Level of Assist:    CGA and Min A   Comment: unsteady and off balance at times, LOB with min A from PT to correct    Stair Training deferred, pt unsafe/not appropriate to complete stairs at this time    Therapeutic Exercise all completed bilaterally unless indicated - lunch tray arrived at end of session and pt wanted to eat, therex terminated. Ankle Pumps x 20 reps  LAQ x 10 reps    Balance  Sitting:  Good ; SBA  Comments: EOB    Standing: Fair +; CGA  Comments: with RW    Patient Education      Role of PT, POC, Discharge recommendations, safety awareness, transfer techniques, pacing activity and calling for assist with mobility. Positioning Needs       Pt reclined in chair, alarm set, positioned in proper neutral alignment and pressure relief provided. Call light provided and all needs within reach    Activity Tolerance   Pt completed therapy session with Pain noted with gait. Other    Assessment :  Patient with good tolerance for tx, continues to be limited by pain with gait and dec endurance. Pt unsteady with gait and demonstrated several LOB requiring min A from PT to correct. Progress gait next visit. Recommending SNF upon discharge as patient functioning well below baseline, demonstrates good rehab potential and unable to return home due to limited or no family support, burden of care beyond caregiver ability, home environment not conducive to patient recovery and limited safety awareness. Goals (all goals ongoing unless otherwise indicated)  To be met in 3 visits:  1).  Independent with LE Ex x 10 reps     To be met in 6 visits:  1). Supine to/from sit: SBA  2). Sit to/from stand: CGA  3). Bed to chair: CGA  4). Gait: Ambulate 50 ft.  with  CGA and use of LRAD (least restrictive assistive device)  5). Tolerate B LE exercises 3 sets of 10-15 reps    Plan   Continue with plan of care. Signature: Rubin Molina, PT, DPT, OMT-C  #[phone]      If patient discharges from this facility prior to next visit, this note will serve as the Discharge Summary.

## 2022-06-10 NOTE — PROGRESS NOTES
L port de accessed. Pt tolerated well. Dressing applied. Report given to EMS staff x2. EMS staff assisted pt to stretcher.

## 2022-06-10 NOTE — FLOWSHEET NOTE
06/09/22 1907   Vital Signs   Temp 98.8 °F (37.1 °C)   Temp Source Oral   Heart Rate 78   Resp 18   BP (!) 105/50   BP Location Right upper arm   BP Method Automatic   MAP (Calculated) 68.33   Patient Position Supine   Level of Consciousness Alert (0)   MEWS Score 1   Oxygen Therapy   SpO2 96 %   O2 Device None (Room air)   Assessment complete- see flowsheets. Pt resting in bed, PRN medication given per pt request at this time, see eMAR and flowsheets for associated medication administration details. Call light within reach, pt aware to call for any needs or changes; colostomy to LUQ CDI. Will continue to monitor.   Abbi Watson RN

## 2022-06-13 ENCOUNTER — CARE COORDINATION (OUTPATIENT)
Dept: CARE COORDINATION | Age: 82
End: 2022-06-13

## 2022-06-13 NOTE — CARE COORDINATION
Chart reviewed. Patient was admitted to hospital on 6/7 for possible pathological right hip fx. She was discharged to [facility] for rehab. Will follow for disposition needs.

## 2022-06-21 ENCOUNTER — TELEPHONE (OUTPATIENT)
Dept: ORTHOPEDIC SURGERY | Age: 82
End: 2022-06-21

## 2022-06-21 NOTE — TELEPHONE ENCOUNTER
General Question     Subject: POST OP QUESTION  Patient and /or Facility Request: Gabriel Dang  Contact Number: [phone]     PATIENT CANCELED HER POST OP APPT FOR Friday WHEN I TRIED TO RESCHEDULE SHE STATED THAT [facility] IS TO FAR FROM THE [place] OFFICE I EXPLAINED TO THE PATIENT SHE WOULD NEED TO FOLLOW UP WITH DR Saranya Donahue DUE TO PREVIOUS SURGERY     PATIENT IS REQUESTING ANOTHER DOCTOR PATIENT CAN BE REACHED AT THE NUMBER LISTED ABOVE SHE WOULD LIKE A CALL BACK REGARDING HER ISSUES

## 2022-06-21 NOTE — TELEPHONE ENCOUNTER
Patient states her son is being transferred home from facility Friday so she had to cancel her appt Friday with [facility]. Advised her to reschedule post op appt for Gina Pandya, she is refusing due to lack of care for her son and transportation want to know if you have an associate at [facility] that can see her. ..

## 2022-06-29 ENCOUNTER — CARE COORDINATION (OUTPATIENT)
Dept: CARE COORDINATION | Age: 82
End: 2022-06-29

## 2022-06-29 ASSESSMENT — SOCIAL DETERMINANTS OF HEALTH (SDOH)
HOW OFTEN DO YOU ATTENT MEETINGS OF THE CLUB OR ORGANIZATION YOU BELONG TO?: NEVER
IN A TYPICAL WEEK, HOW MANY TIMES DO YOU TALK ON THE PHONE WITH FAMILY, FRIENDS, OR NEIGHBORS?: TWICE A WEEK
DO YOU BELONG TO ANY CLUBS OR ORGANIZATIONS SUCH AS CHURCH GROUPS UNIONS, FRATERNAL OR ATHLETIC GROUPS, OR SCHOOL GROUPS?: NO
HOW OFTEN DO YOU ATTEND CHURCH OR RELIGIOUS SERVICES?: NEVER
HOW OFTEN DO YOU GET TOGETHER WITH FRIENDS OR RELATIVES?: TWICE A WEEK

## 2022-06-29 NOTE — PATIENT INSTRUCTIONS
Patient Education        Preventing Falls: Care Instructions  Your Care Instructions     Getting around your home safely can be a challenge if you have injuries or health problems that make it easy for you to fall. Loose rugs and furniture in walkways are among the dangers for many older people who have problems walking or who have poor eyesight. People who have conditions such as arthritis,osteoporosis, or dementia also have to be careful not to fall. You can make your home safer with a few simple measures. Follow-up care is a key part of your treatment and safety. Be sure to make and go to all appointments, and call your doctor if you are having problems. It's also a good idea to know your test results and keep alist of the medicines you take. How can you care for yourself at home? Taking care of yourself   Exercise regularly to improve your strength, muscle tone, and balance. Walk if you can. Swimming may be a good choice if you cannot walk easily.  Have your vision and hearing checked each year or any time you notice a change. If you have trouble seeing and hearing, you might not be able to avoid objects and could lose your balance.  Know the side effects of the medicines you take. Ask your doctor or pharmacist whether the medicines you take can affect your balance. Sleeping pills or sedatives can affect your balance.  Limit the amount of alcohol you drink. Alcohol can impair your balance and other senses.  Ask your doctor whether calluses or corns on your feet need to be removed. If you wear loose-fitting shoes because of calluses or corns, you can lose your balance and fall.  Talk to your doctor if you have numbness in your feet.  You may get dizzy if you do not drink enough water. To prevent dehydration, drink plenty of fluids. Choose water and other clear liquids.  If you have kidney, heart, or liver disease and have to limit fluids, talk with your doctor before you increase the amount of fluids you drink. Preventing falls at home   Remove raised doorway thresholds, throw rugs, and clutter. Repair loose carpet or raised areas in the floor. [facility] furniture and electrical cords to keep them out of walking paths.  Use nonskid floor wax, and wipe up spills right away, especially on ceramic tile floors.  If you use a walker or cane, put rubber tips on it. If you use crutches, clean the bottoms of them regularly with an abrasive pad, such as steel wool.  Keep your house well lit, especially [place], and outside walkways. Use night-lights in areas such as hallways and bathrooms. Add extra light switches or use remote switches (such as switches that go on or off when you clap your hands) to make it easier to turn lights on if you have to get up during the night.  Install sturdy handrails on stairways.  Move items in your cabinets so that the things you use a lot are on the lower shelves (about waist level).  Keep a cordless phone and a flashlight with new batteries by your bed. If possible, put a phone in each of the main rooms of your house, or carry a cell phone in case you fall and cannot reach a phone. Or, you can wear a device around your neck or wrist. You push a button that sends a signal for help.  Wear low-heeled shoes that fit well and give your feet good support. Use footwear with nonskid soles. Check the heels and soles of your shoes for wear. Repair or replace worn heels or soles.  Do not wear socks without shoes on wood floors.  Walk on the grass when the sidewalks are slippery. If you live in an area that gets snow and ice in the winter, sprinkle salt on slippery steps and sidewalks. Or ask a family member or friend to do this for you. Preventing falls in the bath   Install grab bars and nonskid mats inside and outside your shower or tub and near the toilet and sinks.  Use shower chairs and bath benches.    Use a hand-held shower head that will allow you to use grippers that can be worn over your shoes in bad weather.  Be extra careful if weather is bad. Walk on the grass when the sidewalks are slick. If you live in a place that gets snow and ice in the winter, sprinkle salt on slippery stairs and sidewalks.  Be careful getting on or off buses and trains or getting in and out of cars. If handrails are available, use them.  Be careful when you cross the street. Look for crosswalks or places where curb cuts or ramps are present.  Try not to hurry, especially if you are carrying something.  Be cautious in parking lots or garages. There may be curbs or changes in pavement, or the height of the pavement may vary.  Make sure to wear the correct eyeglasses, if you need them. Reading glasses or bifocals can make it harder to see hazards that might be in your way.  If you are walking outdoors for exercise, try to:  ? Walk in well-lighted, well-maintained areas. These include high school or college tracks, shopping malls, and public spaces. ? Walk with a partner. ? Watch out for cracked sidewalks, curbs, changes in the height of the pavement, exposed tree roots, and debris such as fallen leaves or branches. Where can you learn more? Go to https://Xenon Arc.AcadiaSoft. org and sign in to your MyCare account. Enter H997 in the [facility] box to learn more about \"Preventing Outdoor Falls: Care Instructions. \"     If you do not have an account, please click on the \"Sign Up Now\" link. Current as of: September 8, 2021               Content Version: 13.3  © 4278-6781 [email or web address]. Care instructions adapted under license by [facility] ([facility]). If you have questions about a medical condition or this instruction, always ask your healthcare professional. [address] any warranty or liability for your use of this information.          Patient Education        How to Get Up Safely After a Fall: Care Instructions  Your Care Instructions     If you have injuries, health problems, or other reasons that may make it easy for you to fall at home, it is a good idea to learn how to get up safely after a fall. Learning how to get up correctly can help you avoid making an injuryworse. Also, knowing what to do if you cannot get up can help you stay safe until helparrives. Follow-up care is a key part of your treatment and safety. Be sure to make and go to all appointments, and call your doctor if you are having problems. It's also a good idea to know your test results and keep alist of the medicines you take. How can you care for yourself after a fall? If you think you can get up  First lie still for a few minutes and think about how you feel. If your body feels okay and you think you can get up safely, follow the rest of the stepsbelow:  1. Look for a chair or other piece of furniture that is close to you. 2. Roll onto your side and rest. Roll by turning your head in the direction you want to roll, move your shoulder and arm, then hip and leg in the same direction. 3. Lie still for a moment to let your blood pressure adjust.  4. Slowly push your upper body up, lift your head, and take a moment to rest.  5. Slowly get up on your hands and knees, and crawl to the chair or other stable piece of furniture. 6. Put your hands on the chair. 7. Move one foot forward, and place it flat on the floor. Your other leg should be bent with the knee on the floor. 8. Rise slowly, turn your body, and sit in the chair. Stay seated for a bit and think about how you feel. Call for help. Even if you feel okay, let someone know what happened to you. You might not know that you have a serious injury. If you cannot get up  1. If you think you are injured after a fall or you cannot get up, try not to panic. 2. Call out for help. 3. If you have a phone within reach or you have an emergency call device, use it to call for help.   4. If you do not have a phone within reach, try to slide yourself toward it. If you cannot get to the phone, try to slide toward a door or window or a place where you think you can be heard. 5. [place] or use an object to make noise so someone might hear you. 6. If you can reach something that you can use for a pillow, place it under your head. Try to stay warm by covering yourself with a blanket or clothing while you wait for help. When should you call for help? Call 911 anytime you think you may need emergency care. For example, call if:     You passed out (lost consciousness).      You cannot get up after a fall.      You have severe pain. Call your doctor now or seek immediate medical care if:     You have new or worse pain.      You are dizzy or lightheaded.      You hit your head. Watch closely for changes in your health, and be sure to contact your doctor if:     You do not get better as expected. Where can you learn more? Go to https://"Alteryx[employer] Inc."peRuiYieb.PowerCell [place]. org and sign in to your BigDoor account. Enter K207 in the [facility]via[facility] box to learn more about \"How to Get Up Safely After a Fall: Care Instructions. \"     If you do not have an account, please click on the \"Sign Up Now\" link. Current as of: September 8, 2021               Content Version: 13.3  © 2006-2022 Healthwise, Incorporated. Care instructions adapted under license by [facility] ([facility]). If you have questions about a medical condition or this instruction, always ask your healthcare professional. [place] [address] any warranty or liability for your use of this information. Patient Education        Learning About Tests When You Have Diabetes  Why do you need regular tests? Diabetes can lead to other health problems if it's not well controlled. You'll need tests to monitor how well your diabetes is controlled and to check for other things like high cholesterol or kidney problems.  Having tests on a regular schedule can help your doctor find problems early, when it's easier tomanage them. What tests do you need? These are the tests you may need and how often you should have them. A1c blood test.  This test shows the average level of blood sugar over the past 2 to 3 months. It helps your doctor see whether blood sugar levels have been staying within your target range.  How often: Every 3 to 6 months   Goal: A blood sugar level in your target range  Blood pressure test.  This test measures the pressure of blood flow in the arteries. Controllingblood pressure can help prevent damage to nerves and blood vessels.  How often: Every 3 to 6 months   Goal: A blood pressure level in your target range  Cholesterol test.  This test measures the amount of a type of fat in the blood. It is common for people with diabetes to also have high cholesterol. Too much cholesterol in the blood can build up inside the blood vessels and raise the risk for heart attackand stroke.  How often: At the time of your diabetes diagnosis, and as often as your doctor recommends after that   Goal: A cholesterol level in your target range  Albumin-creatinine ratio test.  This test checks for kidney damage by looking for the protein albumin (say \"al-BYOO-kylie\") in the urine. Albumin is normally found in the blood. [place] can let small amounts of it (microalbumin) leak into the urine.  How often: Once a year   Goal: No protein in the urine  Blood creatinine test/estimated glomerular filtration (eGFR). The blood creatinine (say \"qzym-LF-rm-neen\") level shows how well your kidneys are working. Creatinine is a waste product that muscles release into the blood. Blood creatinine is used to estimate the glomerular filtration rate. A high level of creatinine and/or a low eGFR may mean your kidneys are not working aswell as they should.  How often: Once a year   Goal: Normal level of creatinine in the blood.  The eGFR goal is greater than 60 mL/min/1.73 m².  Complete foot exam.  The doctor checks for foot sores and whether any sensation has been lost.   How often: Once a year   Goal: Healthy feet with no foot ulcers or loss of feeling  Dental exam and cleaning. The dentist checks for gum disease and tooth decay. People with high bloodsugar are more likely to have these problems.  How often: Every 6 months   Goal: Healthy teeth and gums  Complete eye exam.  High blood sugar levels can damage the eyes. This exam is done by an ophthalmologist or optometrist. It includes a dilated eye exam. The exam showswhether there's damage to the back of the eye (diabetic retinopathy).  How often: Once a year. If you don't have any signs of diabetic retinopathy, your doctor may recommend an exam every 2 years.  Goal: No damage to the back of the eye  Thyroid-stimulating hormone (TSH) blood test.  This test checks for thyroid disease. Too little thyroid hormone can cause some medicines (like insulin) to stay in the body longer. This can cause low blood sugar. You may be tested if you have high cholesterol or are a woman over 54years old.  How often: As part of your diabetes diagnosis, and as often as your doctor recommends after that   Goal: Normal level of TSH in the blood  Follow-up care is a key part of your treatment and safety. Be sure to make and go to all appointments, and call your doctor if you are having problems. It's also a good idea to know your test results and keep alist of the medicines you take. Where can you learn more? Go to https://CENXrohit.Intelligent Mechatronic Systems. org and sign in to your LoopNet account. Enter [id number] in the [facility] box to learn more about \"Learning About Tests When You Have Diabetes. \"     If you do not have an account, please click on the \"Sign Up Now\" link. Current as of: July 28, 2021               Content Version: 13.3  © 2006-2022 Healthwise, Incorporated. Care instructions adapted under license by [facility] ([facility]). If you have questions about a medical condition or this instruction, always ask your healthcare professional. [address] any warranty or liability for your use of this information. Patient Education        Diabetes Sick-Day Plan: Care Instructions  Your Care Instructions     If you have diabetes, many other illnesses can make your blood sugar go up. This can be dangerous. When you are sick with the flu or another illness, your body releases hormones to fight infection. These hormones raise blood sugar levels. They also make it hard for insulin or other medicines to lower yourblood sugar. Work with your doctor to make a plan for what to do on days when you are sick. Follow-up care is a key part of your treatment and safety. Be sure to make and go to all appointments, and call your doctor if you are having problems. It's also a good idea to know your test results and keep alist of the medicines you take. How can you care for yourself at home?  Work with your doctor to write up a sick-day plan for what to do on days when you are sick. Your blood sugar can go up or down, depending on your illness and whether you can keep food down. Call your doctor when you are sick. Ask if you need to adjust your pills or insulin.  Write down the diabetes medicines you have been taking and whether you have changed the dose based on your sick-day plan. Have this information ready when you call your doctor.  Eat your normal types and amounts of food. Drink extra fluids, such as water, broth, and fruit juice, to prevent dehydration. ? If your blood sugar level is higher than the blood sugar level your doctor recommends (for example, above 240 milligrams per deciliter [mg/dL]), drink extra liquids that don't contain sugar. Examples are water and sugar-free cola. ? If you can't eat your usual foods, then drink extra liquids, such as soup, sports drinks, or milk.  You may also eat food that is gentle on the stomach. These foods include crackers, gelatin dessert, and applesauce. Try to eat or drink 50 grams of carbohydrates every 3 to 4 hours. For example, 6 saltine crackers, 1 cup (8 ounces) of milk, and ½ cup (4 ounces) of orange juice each contain about 15 grams of carbohydrate.  Check your blood sugar at least every 3 to 4 hours. If it goes up fast, check it more often. And check it even through the night. Take insulin if your doctor told you to do so. If you and your doctor didn't have a sick-day plan for taking extra insulin, call your doctor for advice.  If you take insulin, check your urine or blood for ketones. This is even more important if your blood sugar is high.  Do not take any over-the-counter medicines, such as pain relievers, decongestants, or herbal products or other natural medicines, without talking with your doctor first.   Do not drive. If you need to see your doctor or go anywhere else, ask a family member or friend to drive you. When should you call for help? Call 911 anytime you think you may need emergency care. For example, call if:     You passed out (lost consciousness).      You are confused or cannot think clearly.      Your blood sugar is very high or very low. Watch closely for changes in your health, and be sure to contact your doctor if:     Your blood sugar stays outside the level your doctor set for you.      You have any problems. Where can you learn more? Go to https://Citus Datapechrisewjeanne.Quartix. org and sign in to your Imindi account. Enter D627 in the [facility] box to learn more about \"Diabetes Sick-Day Plan: Care Instructions. \"     If you do not have an account, please click on the \"Sign Up Now\" link. Current as of: July 28, 2021               Content Version: 13.3  © 2006-2022 Healthwise, Incorporated. Care instructions adapted under license by [facility] ([facility]).  If you have questions about a medical condition or this instruction, always ask your healthcare professional. [place] [address] any warranty or liability for your use of this information.

## 2022-06-29 NOTE — CARE COORDINATION
Ambulatory Care Coordination Note  6/29/2022  CM Risk Score: 2  Charlson 10 Year Mortality Risk Score: 100%     ACC: Carlton Medina, RN    Summary Note: ACM spoke with patient to re-engage for care coordination. She just returned home from SNF ([facility]) last week for recovery from her hip fx. She is home with PT. She feels she is moving slowly and best she can. She is primary caregiver for her dependent son that is spina bifida with trach and vent. 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 She is undergoing chemotherapy still for cancer. She has a follow up next Wednesday with Dr Gorman Ganser. She plans to discuss if she needs to be out of the home for treatment or if this can changed to an oral form. She is trying to given herself as much time to continue to assist with John. She has several questions on housing. She wants to sell her home and move to a [facility] area. SW if already active with her son an they are discussing housing options for them as he has very different needs. Hip fracture was not from a fall per the patient .  Unknown origin       Past Medical History:   Diagnosis Date    [facility] [address]     CKD (chronic kidney disease) stage 3, GFR 30-59 ml/min ([facility]) 9/17/2019    Gastroesophageal reflux disease without esophagitis 7/20/2021    Hypertension     Hypothyroidism     Mixed hyperlipidemia     Obesity     Thyrotoxicosis     Type 2 diabetes mellitus without complication ([facility])     Type II or unspecified type diabetes mellitus without mention of complication, not stated as uncontrolled      Plan    reinforced zone tools   Medication management   Fall safety   Continue to assess for needed addition of services at home     Lab Results     None          Care Coordination Interventions    Program Enrollment: Rising Risk  Referral from Primary Care Provider: No  Suggested Interventions and [place]: Declined  Occupational Therapy: Declined  Physical Therapy: Completed (Comment: active with home PT 2/29/22 trseun)  Senior Services: Not Started  Social Work: Not Started  Transportation Support: Declined  Zone Management Tools: In Process (Comment: diabetes zone mailed )         Goals Addressed    None         Prior to Admission medications    Medication Sig Start Date End Date Taking?  Authorizing Provider   enoxaparin (LOVENOX) 40 MG/0.4ML Inject 0.4 mLs into the skin daily for 20 days 6/11/22 7/1/22  Holli Soriano MD   sennosides-docusate sodium (SENOKOT-S) 8.6-50 MG tablet Take 1 tablet by mouth 2 times daily 6/10/22   Holli Soriano MD   polyethylene glycol (GLYCOLAX) 17 g packet Take 17 g by mouth daily as needed for Constipation 6/10/22   Holli Soriano MD   potassium chloride (KLOR-CON M) 10 MEQ extended release tablet Take 1 tablet by mouth daily 6/11/22   Holli Soriano MD   Alpha-Lipoic Acid 600 MG CAPS Take 600 mg by mouth daily Takes once in am    Historical Provider, MD   vitamin B-6 (PYRIDOXINE) 100 MG tablet Take 100 mg by mouth daily    Historical Provider, MD   LANTUS SOLOSTAR 100 UNIT/ML injection pen INJECT 10 UNITS INTO THE SKIN NIGHTLY 5/2/22   PILY Seymour CNP   omeprazole (PRILOSEC) 40 MG delayed release capsule TAKE 1 CAPSULE BY MOUTH EVERY MORNING (BEFORE BREAKFAST) 5/2/22   PILY Seymour CNP   glipiZIDE (GLUCOTROL XL) 5 MG extended release tablet TAKE 1 TABLET BY MOUTH DAILY 3/14/22   PILY Seymour CNP   levothyroxine (SYNTHROID) 100 MCG tablet TAKE 1 TABLET BY MOUTH DAILY 2/7/22   PILY Seymour CNP   Nutritional Supplements (ENSURE CLEAR) LIQD Drink one clear ensure daily 1/12/22   PILY Seymour CNP   Insulin Pen Needle ([employer] PEN NEEDLES 29G) 29G X 12MM MISC 1 each by Does not apply route daily 7/21/21   PILY Seymour CNP   blood glucose test strips (ASCENSIA AUTODISC VI;ONE TOUCH ULTRA TEST VI) strip 1 each by In Vitro route daily As needed. 7/21/21   Corrinne Plumber, APRN - CNP       Future Appointments   Date Time Provider Carlyn Kendrick   6/30/2022  2:20 PM Corrinne , APRN - CNP [facility]      and   Diabetes Assessment    Medic Alert ID: No  Meal Planning: Avoidance of concentrated sweets, Plate Method   How often do you test your blood sugar?: Daily   Do you have barriers with adherence to non-pharmacologic self-management interventions?  (Nutrition/Exercise/Self-Monitoring): No   Have you ever had to go to the ED for symptoms of low blood sugar?: No

## 2022-06-30 ENCOUNTER — TELEMEDICINE (OUTPATIENT)
Dept: FAMILY MEDICINE CLINIC | Age: 82
End: 2022-06-30
Payer: MEDICARE

## 2022-06-30 DIAGNOSIS — Z96.641 S/P TOTAL RIGHT HIP ARTHROPLASTY: ICD-10-CM

## 2022-06-30 DIAGNOSIS — Z09 ENCOUNTER FOR EXAMINATION FOLLOWING TREATMENT AT HOSPITAL: Primary | ICD-10-CM

## 2022-06-30 DIAGNOSIS — E87.6 HYPOKALEMIA: ICD-10-CM

## 2022-06-30 PROCEDURE — 1123F ACP DISCUSS/DSCN MKR DOCD: CPT | Performed by: NURSE PRACTITIONER

## 2022-06-30 PROCEDURE — G8427 DOCREV CUR MEDS BY ELIG CLIN: HCPCS | Performed by: NURSE PRACTITIONER

## 2022-06-30 PROCEDURE — 99213 OFFICE O/P EST LOW 20 MIN: CPT | Performed by: NURSE PRACTITIONER

## 2022-06-30 PROCEDURE — 1111F DSCHRG MED/CURRENT MED MERGE: CPT | Performed by: NURSE PRACTITIONER

## 2022-06-30 PROCEDURE — 1090F PRES/ABSN URINE INCON ASSESS: CPT | Performed by: NURSE PRACTITIONER

## 2022-06-30 PROCEDURE — G8400 PT W/DXA NO RESULTS DOC: HCPCS | Performed by: NURSE PRACTITIONER

## 2022-06-30 RX ORDER — POTASSIUM CHLORIDE 750 MG/1
10 TABLET, EXTENDED RELEASE ORAL DAILY
Qty: 60 TABLET | Refills: 3 | Status: SHIPPED | OUTPATIENT
Start: 2022-06-30 | End: 2022-06-30

## 2022-06-30 RX ORDER — POTASSIUM CHLORIDE 750 MG/1
10 TABLET, EXTENDED RELEASE ORAL DAILY
Qty: 30 TABLET | Refills: 3 | Status: SHIPPED | OUTPATIENT
Start: 2022-06-30

## 2022-06-30 NOTE — PROGRESS NOTES
2022    TELEHEALTH EVALUATION -- Audio/Visual (During [id number] public health emergency)    HPI:    Raghav Colorado (:  1940) has requested an audio/video evaluation for the following concern(s):    [facility] presents for a virtual visit to follow up on her recent rehab stay from [place] [id number] from when she went to the hospital from 2022 - 06/10/2022 for right hip fracture. She admits to not having any trauma to cause her right hip fracture. She had a total right hip arthroplasty with bone biopsy sent out. PT/OT was initiated at that time. Today, [facility] states she is doing well. She is doing home PT/OT. She has home health care as well from [facility]. She is taking Tylenol for her pain. She states she is having pain when she is moving around, but states she is able to ambulate throughout her home. She feels like she is healing well. She denies any fever or chills. She is asking for a refill on her potassium supplement. Hospital record reviewed including labs and imaging from -06/10/2022. Review of Systems   Constitutional: Negative. Negative for activity change, appetite change, chills, diaphoresis, fatigue, fever and unexpected weight change. HENT: Negative. Negative for ear pain, rhinorrhea, sinus pressure, sneezing, sore throat and trouble swallowing. Eyes: Negative for photophobia, pain, discharge, redness, itching and visual disturbance. Respiratory: Negative. Negative for apnea, cough, choking, chest tightness, shortness of breath, wheezing and stridor. Cardiovascular: Negative for chest pain, palpitations and leg swelling. Gastrointestinal: Negative. Negative for abdominal pain, blood in stool, constipation, diarrhea, nausea and vomiting. Genitourinary: Negative. Negative for decreased urine volume, difficulty urinating, dysuria, enuresis, flank pain, frequency, genital sores, hematuria and urgency.    Musculoskeletal: Positive for arthralgias (R/t Topics    Alcohol use: No    Drug use: No        Allergies   Allergen Reactions    Food Color Pink Anaphylaxis    Shellfish Allergy Anaphylaxis     Heart scan    Atorvastatin      Other reaction(s): Weakness  Leg weakness    Ramipril      Other reaction(s): Cough    Metformin Nausea And Vomiting   ,   Past Medical History:   Diagnosis Date    Carcinomatosis ([facility] [address]     CKD (chronic kidney disease) stage 3, GFR 30-59 ml/min ([facility]) 9/17/2019    Gastroesophageal reflux disease without esophagitis 7/20/2021    Hypertension     Hypothyroidism     Mixed hyperlipidemia     Obesity     Thyrotoxicosis     Type 2 diabetes mellitus without complication ([facility] [address]     Type II or unspecified type diabetes mellitus without mention of complication, not stated as uncontrolled    ,   Past Surgical History:   Procedure Laterality Date    ANKLE FRACTURE SURGERY Right 2007    CARPAL TUNNEL RELEASE Left 1998    CATARACT REMOVAL WITH IMPLANT Right 03/01/2018     PHACO EMULSIFICATION OF CATARACT WITH INTRA OCULAR LENS IMPLANT RIGHT EYE    CATARACT REMOVAL WITH IMPLANT Left 06/14/2018    COLOSTOMY N/A 12/31/2021    EXPLORATORY LAPAROTOMY, DIVERTING LOOP COLOSTOMY, PERITONEAL BIOPSY performed by Talon Rucker MD at [address] [place] [address] Right 6/8/2022    RIGHT HIP BIPOLAR HEMIARTHROPLASTY performed by Pieter Marroquin MD at [id number] [place] [address] (CERVIX STATUS UNKNOWN)      KNEE ARTHROSCOPY Right 2015    PORT SURGERY Left 01/31/2022    [facility] placement    PORT SURGERY N/A 1/31/2022    PORT INSERTION performed by Mariel Stone MD at [facility]   ,   Social History     Tobacco Use    Smoking status: Never Smoker    Smokeless tobacco: Never Used   Vaping Use    Vaping Use: Never used   Substance Use Topics    Alcohol use: No    Drug use: No   ,   Family History   Problem Relation Age of Onset    Diabetes Sister     Heart Disease Sister     High Blood Pressure Sister     Diabetes Brother    , Immunization History   Administered Date(s) Administered    COVID-19, PFIZER PURPLE top, DILUTE for use, (age 15 y+), 30mcg/0.3mL 05/21/2021, 06/11/2021    Influenza Virus Vaccine 09/25/2012    Influenza, High Dose (Fluzone 65 yrs and older) 11/06/2014, 11/10/2016    Influenza, Clarissa Cuna, IM, (6 mo and older Fluzone, Flulaval, Fluarix and 3 yrs and older Afluria) 10/18/2018    Influenza, Quadv, IM, PF (6 mo and older Fluzone, Flulaval, Fluarix, and 3 yrs and older Afluria) 10/25/2017, 10/25/2017, 10/10/2019    Influenza, Clarissa Cuna, Recombinant, IM PF (Flublok 18 yrs and older) 10/21/2020    Pneumococcal Conjugate 13-valent ([id number]) 12/08/2017    Pneumococcal Polysaccharide ([id number]) 03/10/2016   ,   Health Maintenance   Topic Date Due    DTaP/Tdap/Td vaccine (1 - Tdap) 07/20/2022 (Originally 9/8/1959)    Shingles vaccine (1 of 2) 07/20/2022 (Originally 9/8/1991)    COVID-19 Vaccine (3 - Pfizer risk 4-dose series) 12/29/2023 (Originally 7/9/2021)    Flu vaccine (1) 09/01/2022    Depression Screen  03/23/2023    Annual Wellness Visit (AWV)  03/24/2023    Pneumococcal 65+ years Vaccine  Completed    Hepatitis A vaccine  Aged Out    Hib vaccine  Aged Out    Meningococcal (ACWY) vaccine  Aged Out       PHYSICAL EXAMINATION:  [ INSTRUCTIONS:  \"[x]\" Indicates a positive item  \"[]\" Indicates a negative item      Constitutional: [x] Appears well-developed and well-nourished [x] No apparent distress      [] Abnormal-   Mental status  [x] Alert and awake  [x] Oriented to person/place/time [x]Able to follow commands      Eyes:  EOM    [x]  Normal  [] Abnormal-  Sclera  [x]  Normal  [] Abnormal -         Discharge [x]  None visible  [] Abnormal -    HENT:   [x] Normocephalic, atraumatic.   [] Abnormal   [x] Mouth/Throat: Mucous membranes are moist.     External Ears [x] Normal  [] Abnormal-     Neck: [x] No visualized mass     Pulmonary/Chest: [x] Respiratory effort normal.  [x] No visualized signs of difficulty breathing or respiratory distress        [] Abnormal-      Musculoskeletal:   [x] Normal gait with no signs of ataxia         [x] Normal range of motion of neck        [] Abnormal-       Neurological:        [x] No Facial Asymmetry (Cranial nerve 7 motor function) (limited exam to video visit)          [x] No gaze palsy        [] Abnormal-         Skin:        [x] No significant exanthematous lesions or discoloration noted on facial skin         [] Abnormal-            Psychiatric:       [x] Normal Affect [x] No Hallucinations        [] Abnormal-      ASSESSMENT/PLAN:  Chicho Hyatt was seen today for other. Chicho Hyatt has been doing well since she has been out of the hospital and completed rehab. She is now doing home PT/OT and home health  She is continue with her home PT OT and home health care. Recommend updating her potassium and magnesium. Order placed for BMP and magnesium. Diagnoses and all orders for this visit:    Encounter for examination following treatment at hospital    S/P total right hip arthroplasty  -     Magnesium; Future  -     Basic Metabolic Panel; Future    Hypokalemia  -     potassium chloride (KLOR-CON M) 10 MEQ extended release tablet; Take 1 tablet by mouth daily  -     Magnesium; Future  -     Basic Metabolic Panel; Future        Return if symptoms worsen or fail to improve. Debbie Mendoza is a [de-identified] y.o. female being evaluated by a Virtual Visit (video visit) encounter to address concerns as mentioned above. A caregiver was present when appropriate. Due to this being a TeleHealth encounter (During [place] [address] public health emergency), evaluation of the following organ systems was limited: Vitals/Constitutional/EENT/Resp/CV/GI//MS/Neuro/Skin/Heme-Lymph-Imm.   Pursuant to the emergency declaration under the [facility], [address] [place] [address] authority and the [employer] Act, this Virtual Visit was conducted with patient's (and/or legal guardian's) consent, to reduce the patient's risk of exposure to COVID-19 and provide necessary medical care. The patient (and/or legal guardian) has also been advised to contact this office for worsening conditions or problems, and seek emergency medical treatment and/or call 911 if deemed necessary. Patient identification was verified at the start of the visit: Yes    Total time spent on this encounter: Not billed by time    Services were provided through a video synchronous discussion virtually to substitute for in-person clinic visit. Patient and provider were located at their individual homes. --Dion Hernandez, APRN - CNP on 7/3/2022 at 3:05 PM    An electronic signature was used to authenticate this note. Patient should call the office immediately with new or ongoing signs or symptoms or worsening, or proceed to the emergency room. All entries in chief complaint and history of present illness are reviewed and validated by me. No changes in past medical history, past surgical history, social history, or family history were noted during the patient encounter unless specifically listed above. All updates of past medical history, past surgical history, social history, or family history were reviewed personally by me during the office visit. All problems listed in the assessment are stable unless noted otherwise. Medication profile reviewed personally by me during the office visit. Medication side effects and possible impairments from medications were discussed as applicable. Every effort has been made to assure accurate transcription by this voice recognition software. However, mistakes in transcription may still occur    You are being started on a new medication. All medications have the potential for adverse effects. All medications effect each person differently. Please read and review provided information related to medication.  If the medication that you have been prescribed has the potential to cause sedation, do not drive or operate car, truck, or heavy machinery until you know how the medication will effect you. If you experience any adverse effects from the medication, please call the office or report to the emergency department.

## 2022-06-30 NOTE — TELEPHONE ENCOUNTER
Lm to notify pt [facility] ([facility]) may be a better location.  alvaro

## 2022-07-03 ASSESSMENT — ENCOUNTER SYMPTOMS
EYE DISCHARGE: 0
TROUBLE SWALLOWING: 0
EYE REDNESS: 0
BACK PAIN: 0
WHEEZING: 0
ALLERGIC/IMMUNOLOGIC NEGATIVE: 1
CHEST TIGHTNESS: 0
STRIDOR: 0
RHINORRHEA: 0
SINUS PRESSURE: 0
ABDOMINAL PAIN: 0
NAUSEA: 0
EYE PAIN: 0
EYE ITCHING: 0
DIARRHEA: 0
VOMITING: 0
SORE THROAT: 0
APNEA: 0
CHOKING: 0
GASTROINTESTINAL NEGATIVE: 1
SHORTNESS OF BREATH: 0
RESPIRATORY NEGATIVE: 1
COUGH: 0
BLOOD IN STOOL: 0
COLOR CHANGE: 0
CONSTIPATION: 0
PHOTOPHOBIA: 0

## 2022-07-07 ENCOUNTER — TELEPHONE (OUTPATIENT)
Dept: FAMILY MEDICINE CLINIC | Age: 82
End: 2022-07-07

## 2022-07-07 DIAGNOSIS — E11.65 TYPE 2 DIABETES MELLITUS WITH HYPERGLYCEMIA, WITH LONG-TERM CURRENT USE OF INSULIN (HCC): Primary | ICD-10-CM

## 2022-07-07 DIAGNOSIS — Z79.4 TYPE 2 DIABETES MELLITUS WITH HYPERGLYCEMIA, WITH LONG-TERM CURRENT USE OF INSULIN (HCC): Primary | ICD-10-CM

## 2022-07-07 DIAGNOSIS — E78.2 MIXED HYPERLIPIDEMIA: ICD-10-CM

## 2022-07-07 DIAGNOSIS — I10 ESSENTIAL HYPERTENSION: ICD-10-CM

## 2022-07-07 NOTE — TELEPHONE ENCOUNTER
----- Message from PILY Adams CNP sent at 7/7/2022  7:27 AM EDT -----  Reviewed [facility] labs from yesterday. Potassium WNL. She will need to continue on her potassium supplement. Recommend CMP, A1C , and lipid in 2-3 weeks. May send orders to her home health agency to draw labs. Please route back to this provider once completed.

## 2022-07-21 ENCOUNTER — HOSPITAL ENCOUNTER (OUTPATIENT)
Age: 82
Setting detail: SPECIMEN
Discharge: HOME | End: 2022-07-21
Payer: MEDICARE

## 2022-07-21 LAB
A/G RATIO: 0.9 (ref 1.1–2.2)
ALBUMIN SERPL-MCNC: 3.1 G/DL (ref 3.4–5)
ALP BLD-CCNC: 110 U/L (ref 40–129)
ALT SERPL-CCNC: 24 U/L (ref 10–40)
ANION GAP SERPL CALCULATED.3IONS-SCNC: 11 MMOL/L (ref 3–16)
AST SERPL-CCNC: 42 U/L (ref 15–37)
BILIRUB SERPL-MCNC: 0.6 MG/DL (ref 0–1)
BUN BLDV-MCNC: 16 MG/DL (ref 7–20)
CALCIUM SERPL-MCNC: 8.8 MG/DL (ref 8.3–10.6)
CHLORIDE BLD-SCNC: 102 MMOL/L (ref 99–110)
CO2: 26 MMOL/L (ref 21–32)
CREAT SERPL-MCNC: 0.6 MG/DL (ref 0.6–1.2)
GFR AFRICAN AMERICAN: >60
GFR NON-AFRICAN AMERICAN: >60
GLUCOSE BLD-MCNC: 92 MG/DL (ref 70–99)
POTASSIUM SERPL-SCNC: 3.5 MMOL/L (ref 3.5–5.1)
SODIUM BLD-SCNC: 139 MMOL/L (ref 136–145)
TOTAL PROTEIN: 6.7 G/DL (ref 6.4–8.2)

## 2022-07-21 PROCEDURE — 83036 HEMOGLOBIN GLYCOSYLATED A1C: CPT

## 2022-07-21 PROCEDURE — 80061 LIPID PANEL: CPT

## 2022-07-21 PROCEDURE — 80053 COMPREHEN METABOLIC PANEL: CPT

## 2022-07-22 LAB
CHOLESTEROL, TOTAL: 178 MG/DL (ref 0–199)
ESTIMATED AVERAGE GLUCOSE: 168.6 MG/DL
HBA1C MFR BLD: 7.5 %
HDLC SERPL-MCNC: 64 MG/DL (ref 40–60)
LDL CHOLESTEROL CALCULATED: 95 MG/DL
TRIGL SERPL-MCNC: 94 MG/DL (ref 0–150)
VLDLC SERPL CALC-MCNC: 19 MG/DL

## 2022-07-29 DIAGNOSIS — Z79.4 TYPE 2 DIABETES MELLITUS WITH HYPERGLYCEMIA, WITH LONG-TERM CURRENT USE OF INSULIN (HCC): ICD-10-CM

## 2022-07-29 DIAGNOSIS — I10 ESSENTIAL HYPERTENSION: ICD-10-CM

## 2022-07-29 DIAGNOSIS — E11.65 TYPE 2 DIABETES MELLITUS WITH HYPERGLYCEMIA, WITH LONG-TERM CURRENT USE OF INSULIN (HCC): ICD-10-CM

## 2022-07-29 RX ORDER — HYDROCHLOROTHIAZIDE 25 MG/1
25 TABLET ORAL DAILY
Qty: 90 TABLET | Refills: 0 | OUTPATIENT
Start: 2022-07-29

## 2022-07-29 RX ORDER — LANCING DEVICE
EACH MISCELLANEOUS
Qty: 100 EACH | Refills: 2 | Status: SHIPPED | OUTPATIENT
Start: 2022-07-29

## 2022-08-02 DIAGNOSIS — I10 ESSENTIAL HYPERTENSION: Primary | ICD-10-CM

## 2022-08-02 RX ORDER — HYDROCHLOROTHIAZIDE 25 MG/1
TABLET ORAL
Qty: 90 TABLET | Refills: 1 | Status: SHIPPED | OUTPATIENT
Start: 2022-08-02

## 2022-08-08 ENCOUNTER — CARE COORDINATION (OUTPATIENT)
Dept: CARE COORDINATION | Age: 82
End: 2022-08-08

## 2022-08-08 NOTE — CARE COORDINATION
ACM attempted outreach. Left message. Contact information for call back provided.        Plan   Reinforce zone tools  Medication management  Fall safety  Continue to assess for needed addition of services at home and additional need for disabled son that she cares for

## 2022-08-31 DIAGNOSIS — E03.8 OTHER SPECIFIED HYPOTHYROIDISM: ICD-10-CM

## 2022-08-31 RX ORDER — LEVOTHYROXINE SODIUM 0.1 MG/1
100 TABLET ORAL DAILY
Qty: 90 TABLET | Refills: 0 | Status: SHIPPED | OUTPATIENT
Start: 2022-08-31

## 2022-09-09 ENCOUNTER — CARE COORDINATION (OUTPATIENT)
Dept: CARE COORDINATION | Age: 82
End: 2022-09-09

## 2022-09-09 NOTE — CARE COORDINATION
ACM attempted outreach. Left message. Contact information for call back provided. 2nd missed outreach.

## 2022-09-12 ENCOUNTER — HOSPITAL ENCOUNTER (OUTPATIENT)
Dept: CT IMAGING | Age: 82
Discharge: HOME | End: 2022-09-12
Payer: MEDICARE

## 2022-09-12 ENCOUNTER — HOSPITAL ENCOUNTER (OUTPATIENT)
Age: 82
Discharge: HOME | End: 2022-09-12
Payer: MEDICARE

## 2022-09-12 DIAGNOSIS — C18.7 MALIGNANT NEOPLASM OF SIGMOID COLON (HCC): ICD-10-CM

## 2022-09-12 PROCEDURE — 84520 ASSAY OF UREA NITROGEN: CPT

## 2022-09-12 PROCEDURE — 6360000004 HC RX CONTRAST MEDICATION: Performed by: INTERNAL MEDICINE

## 2022-09-12 PROCEDURE — 74177 CT ABD & PELVIS W/CONTRAST: CPT

## 2022-09-12 PROCEDURE — 36415 COLL VENOUS BLD VENIPUNCTURE: CPT

## 2022-09-12 PROCEDURE — 82565 ASSAY OF CREATININE: CPT

## 2022-09-12 RX ADMIN — IOPAMIDOL 75 ML: 755 INJECTION, SOLUTION INTRAVENOUS at 09:46

## 2022-09-12 RX ADMIN — IOHEXOL 50 ML: 240 INJECTION, SOLUTION INTRATHECAL; INTRAVASCULAR; INTRAVENOUS; ORAL at 09:40

## 2022-10-12 ENCOUNTER — CARE COORDINATION (OUTPATIENT)
Dept: CARE COORDINATION | Age: 82
End: 2022-10-12

## 2022-10-12 NOTE — CARE COORDINATION
Ambulatory Care Coordination Note  10/12/2022    ACC: Hudson Weir RN    ACJENAE spoke with patient for follow up. She continues to have hip pain and mobility issues. She has neuropathy in her hands as well. She is using her walker or cane. She is the primary caregiver for her disabled son that requires a lot of care. He vent/trach dependent. He has ileo conduit and needs assist with all ADLs. She is still undergoing chemo and tolerating this. Reports her weight is up 10 lbs and she has been trying to gain and eat better. No active home services for her. She and her son have spoken with SW and are aware of options for his care if he declines further or for her self. Her plan is current to remain at home and add supports as needed.      Lab Results   Component Value Date    LABA1C 7.5 07/21/2022    LABA1C 7.0 06/07/2022    LABA1C 8.7 12/30/2021     Lab Results   Component Value Date    .6 07/21/2022    .2 06/07/2022    .0 12/30/2021     Plan    Follow up call in 2 weeks  Follow up for additional referral needs and review options again   Assess for discharge >6 months on service      Offered patient enrollment in the Remote Patient Monitoring (RPM) program for in-home monitoring: Patient is not eligible for RPM program.    Lab Results       None            Care Coordination Interventions    Program Enrollment: Rising Risk  Referral from Primary Care Provider: No  Suggested Interventions and Community Resources  Fall Risk Prevention: Completed  Home Health Services: Declined  Occupational Therapy: Declined  Physical Therapy: Completed (Comment: active with home PT 2/29/22 trb)  Senior Services: Completed  Social Work: Completed  Transportation Support: Declined  Zone Management Tools: Completed (Comment: diabetes zone mailed )          Goals Addressed                   This Visit's Progress       Care Coordination     Community Resource Goal   No change     I will complete referral to community agency [facility] (Senior Services) and social work  for assistance. I will work to learn about resource available to me and for my care at home as needed. Barriers: impairment:  physical: hip pain and decreased mobility   Plan for overcoming my barriers: education and support  Confidence: 8/10  Anticipated Goal Completion Date: 12/11    Patient is aware that her health is not good and that she may have a decline and need additional support as she is going through chemo and is the primary caregiver for her disabled son that is vent dependent at home with her. 10/12/22/ trb        COMPLETED: Wellness Goal   On track     Patient Self-Management Goal for Health Maintenance  Goal: I will follow a healthy diet as discussed by my provider and report decreases or weight loss for additional nutritional support. Barriers: none  Plan for overcoming my barriers: N/A  Confidence: 6/10  Anticipated Goal Completion Date: 10/22    Weight is up 10 lbs. No loss of appetite with chemotherapy. Discussed need to report weight changes such as fast increases or loss ongoing. Encouraged use of supplements to prevent muscle loose and help with fatigue again. 10/12/22 trb               Prior to Admission medications    Medication Sig Start Date End Date Taking?  Authorizing Provider   levothyroxine (SYNTHROID) 100 MCG tablet TAKE 1 TABLET BY MOUTH DAILY 8/31/22   Maynor Sanchez MD   hydroCHLOROthiazide (HYDRODIURIL) 25 MG tablet TAKE 1 TABLET BY MOUTH DAILY 8/2/22   PILY Nixon CNP   GLOBAL EASE INJECT PEN NEEDLES 29G X 12MM MISC USE AS DIRECTED DAILY 7/29/22   PILY Aquino CNP   potassium chloride (KLOR-CON M) 10 MEQ extended release tablet Take 1 tablet by mouth daily 6/30/22   PILY Nixon CNP   Alpha-Lipoic Acid 600 MG CAPS Take 600 mg by mouth daily Takes once in am    Historical Provider, MD   vitamin B-6 (PYRIDOXINE) 100 MG tablet Take 100 mg by mouth daily Historical Provider, MD   LANTUS SOLOSTAR 100 UNIT/ML injection pen INJECT 10 UNITS INTO THE SKIN NIGHTLY 5/2/22   PILY Massey CNP   omeprazole (PRILOSEC) 40 MG delayed release capsule TAKE 1 CAPSULE BY MOUTH EVERY MORNING (BEFORE BREAKFAST) 5/2/22   PILY Massey CNP   glipiZIDE (GLUCOTROL XL) 5 MG extended release tablet TAKE 1 TABLET BY MOUTH DAILY 3/14/22   PILY Massey CNP   blood glucose test strips (ASCENSIA AUTODISC VI;ONE TOUCH ULTRA TEST VI) strip 1 each by In Vitro route daily As needed. 7/21/21   PILY Massey CNP       No future appointments. and   Diabetes Assessment    Medic Alert ID: No  Meal Planning: Avoidance of concentrated sweets, Plate Method   How often do you test your blood sugar?: Daily   Do you have barriers with adherence to non-pharmacologic self-management interventions?  (Nutrition/Exercise/Self-Monitoring): No   Have you ever had to go to the ED for symptoms of low blood sugar?: No       No patient-reported symptoms

## 2022-10-20 ENCOUNTER — NURSE ONLY (OUTPATIENT)
Dept: FAMILY MEDICINE CLINIC | Age: 82
End: 2022-10-20
Payer: MEDICARE

## 2022-10-20 DIAGNOSIS — Z23 NEEDS FLU SHOT: Primary | ICD-10-CM

## 2022-10-20 PROCEDURE — G0008 ADMIN INFLUENZA VIRUS VAC: HCPCS | Performed by: NURSE PRACTITIONER

## 2022-10-20 PROCEDURE — 90694 VACC AIIV4 NO PRSRV 0.5ML IM: CPT | Performed by: NURSE PRACTITIONER

## 2022-11-01 DIAGNOSIS — K21.9 GASTROESOPHAGEAL REFLUX DISEASE WITHOUT ESOPHAGITIS: ICD-10-CM

## 2022-11-01 RX ORDER — OMEPRAZOLE 40 MG/1
40 CAPSULE, DELAYED RELEASE ORAL
Qty: 90 CAPSULE | Refills: 0 | Status: SHIPPED | OUTPATIENT
Start: 2022-11-01

## 2022-11-04 ENCOUNTER — CARE COORDINATION (OUTPATIENT)
Dept: CARE COORDINATION | Age: 82
End: 2022-11-04

## 2022-11-14 NOTE — CARE COORDINATION
Ambulatory Care Coordination Note  11/4/22  ACC: Laine Rojas, RN    ACM spoke patient for outreach. She is still currently following up with treatments for her cancer. (Stage IV colon CA with mets in lungs and peritoneum). Colostomy. History of fall for patient with hip fx in last 6 months. Ambulates with use of walker. She is managing to get there and take care of her son. He has skilled [address] at this time but no available Pass[employer] care. She is still the primary caregiver for her son Adali An that has hx of spinal bifida, trach, ileostomy,  and vent support at night. John's care is becoming harder for her as she has had some weakness with her illness. She does have a neighbor that helps some with his care when she needs to leave. He also has had some additional declines in health with some edema and his movements to assist her have become more limited. Getting him out and in the w/c to transport his has become cumbersome. She has voiced difficulty turning him at times. She is looking to start visiting physicians for him due to the lack of transportation outside the home. Active with with [facility] for cancer treatments. Notes indicates they have discussed hospice in the past. She has not made that transition at this time. She and her son have been working to have plans in place for his care if she becomes ill or for when she declines further in health. He is alert and orient and his decsion maker but dependent on care.      Lab Results   Component Value Date    LABA1C 7.5 07/21/2022    LABA1C 7.0 06/07/2022    LABA1C 8.7 12/30/2021     Lab Results   Component Value Date    .6 07/21/2022    .2 06/07/2022    .0 12/30/2021     Patient has not reported any high or low blood sugars         Plan    Continue to follow for additional support for home   Discuss palliative care for Ayse Barber as an option/Hospice   Fall safety continues   Continue to assess Nutritions/weights     Offered patient enrollment in the Remote Patient Monitoring (RPM) program for in-home monitoring: Yes, but did not enroll at this time. Lab Results       None            Care Coordination Interventions    Program Enrollment: Rising Risk  Referral from Primary Care Provider: No  Suggested Interventions and Community Resources  Fall Risk Prevention: Completed  Home Health Services: Declined  Occupational Therapy: Declined  Physical Therapy: Completed (Comment: active with home PT 2/29/22 trb)  Senior Services: Completed  Social Work: Completed  Transportation Support: Declined  Zone Management Tools: Completed (Comment: diabetes zone mailed )          Goals Addressed                   This [address] Resource Goal   Improving     I will complete referral to community agency [facility] (Senior Services) and social work  for assistance. I will work to learn about resource available to me and for my care at home as needed. Barriers: impairment:  physical: hip pain and decreased mobility   Plan for overcoming my barriers: education and support  Confidence: 8/10  Anticipated Goal Completion Date: 12/11    Patient is aware that her health is not good and that she may have a decline and need additional support as she is going through chemo and is the primary caregiver for her disabled son that is vent dependent at home with her. She is working to get additional home supports for her son. She has spoke and work with  as well for support. 11/14/22 trb        Reduce Falls    No change     I will reduce my risk of falls by the following: Use walking aids like cane or walker  Continue to look at additional home support for her disable son and home supports for her such as [facility] or [facility]. Palliative/hospice.      Barriers: impairment:  physical: weakness and overwhelmed by complexity of regimen  Plan for overcoming my barriers: education and support   Confidence: 6/10  Anticipated Goal Completion Date:12/22    Patient is working to get visiting home physician set up for her son. She continues to feel she is able to to her own day to day care at this time. She does feel weaker but is managing. No new falls using walker. 11/4/22 trb               Prior to Admission medications    Medication Sig Start Date End Date Taking? Authorizing Provider   omeprazole (PRILOSEC) 40 MG delayed release capsule TAKE 1 CAPSULE BY MOUTH EVERY MORNING (BEFORE BREAKFAST) 11/1/22   PILY Nixon CNP   levothyroxine (SYNTHROID) 100 MCG tablet TAKE 1 TABLET BY MOUTH DAILY 8/31/22   Maynor Sanchez MD   hydroCHLOROthiazide (HYDRODIURIL) 25 MG tablet TAKE 1 TABLET BY MOUTH DAILY 8/2/22   PILY Nixon CNP   GLOBAL EASE INJECT PEN NEEDLES 29G X 12MM MISC USE AS DIRECTED DAILY 7/29/22   PILY Aquino CNP   potassium chloride (KLOR-CON M) 10 MEQ extended release tablet Take 1 tablet by mouth daily 6/30/22   PILY Nixon CNP   Alpha-Lipoic Acid 600 MG CAPS Take 600 mg by mouth daily Takes once in am    Historical Provider, MD   vitamin B-6 (PYRIDOXINE) 100 MG tablet Take 100 mg by mouth daily    Historical Provider, MD   LANTUS SOLOSTAR 100 UNIT/ML injection pen INJECT 10 UNITS INTO THE SKIN NIGHTLY 5/2/22   PILY Nixon CNP   glipiZIDE (GLUCOTROL XL) 5 MG extended release tablet TAKE 1 TABLET BY MOUTH DAILY 3/14/22   PILY Nixon CNP   blood glucose test strips (ASCENSIA AUTODISC VI;ONE TOUCH ULTRA TEST VI) strip 1 each by In Vitro route daily As needed. 7/21/21   PILY Nixon CNP       No future appointments.

## 2022-11-25 ENCOUNTER — CARE COORDINATION (OUTPATIENT)
Dept: CARE COORDINATION | Age: 82
End: 2022-11-25

## 2022-11-25 NOTE — CARE COORDINATION
Ambulatory Care Coordination Note  11/25/2022    ACC: Sofi Hope RN    ACM completed at brief call with the patient. She is currently in [facility] AT [facility] and admitted. She told me that her son Donato Lay was also there in ICU as well. Madeline Taylor stated she was having blooding now from her bowels and nose. She was thinking about hospice she told me. I have advised her to continue to work with SW and D/C planning there as they would also be working with Donato Lay as well. ACM will continue to follow for additional support needs. Plan    Follow up status next week    Offered patient enrollment in the Remote Patient Monitoring (RPM) program for in-home monitoring: NA. Current IP at [facility] AT [facility]     Lab Results       None            Care Coordination Interventions    Program Enrollment: Rising Risk  Referral from Primary Care Provider: No  Suggested Interventions and Community Resources  Fall Risk Prevention: Completed  Home Health Services: Declined  Occupational Therapy: Declined  Physical Therapy: Completed (Comment: active with home PT 2/29/22 trb)  Senior Services: Completed  Social Work: Completed  Transportation Support: Declined  Zone Management Tools: Completed (Comment: diabetes zone mailed )          Goals Addressed    None         Prior to Admission medications    Medication Sig Start Date End Date Taking?  Authorizing Provider   omeprazole (PRILOSEC) 40 MG delayed release capsule TAKE 1 CAPSULE BY MOUTH EVERY MORNING (BEFORE BREAKFAST) 11/1/22   Danna Leyden, APRN - CNP   levothyroxine (SYNTHROID) 100 MCG tablet TAKE 1 TABLET BY MOUTH DAILY 8/31/22   Fco Hutchison MD   hydroCHLOROthiazide (HYDRODIURIL) 25 MG tablet TAKE 1 TABLET BY MOUTH DAILY 8/2/22   Danna Leyden, APRN - CNP   GLOBAL EASE INJECT PEN NEEDLES 29G X 12MM MISC USE AS DIRECTED DAILY 7/29/22   PILY Cochran CNP   potassium chloride (KLOR-CON M) 10 MEQ extended release tablet Take 1 tablet by mouth daily 6/30/22   Mike Smiley Moshe, APRN - CNP   Alpha-Lipoic Acid 600 MG CAPS Take 600 mg by mouth daily Takes once in am    Historical Provider, MD   vitamin B-6 (PYRIDOXINE) 100 MG tablet Take 100 mg by mouth daily    Historical Provider, MD   LANTUS SOLOSTAR 100 UNIT/ML injection pen INJECT 10 UNITS INTO THE SKIN NIGHTLY 5/2/22   PILY Bueno CNP   glipiZIDE (GLUCOTROL XL) 5 MG extended release tablet TAKE 1 TABLET BY MOUTH DAILY 3/14/22   PILY Bueno CNP   blood glucose test strips (ASCENSIA AUTODISC VI;ONE TOUCH ULTRA TEST VI) strip 1 each by In Vitro route daily As needed.  7/21/21   PILY Bueno CNP       Future Appointments   Date Time Provider Carlyn Kendrick   12/7/2022  9:00 AM MMO CT RM 1 [facility]

## 2022-11-28 ENCOUNTER — CARE COORDINATION (OUTPATIENT)
Dept: CARE COORDINATION | Age: 82
End: 2022-11-28

## 2022-11-28 NOTE — CARE COORDINATION
ROGER received a call from [place], [facility] [place] [facility] AT [facility] Case management. She updated me that the patient had  there.

## (undated) DEVICE — YANKAUER,POOLE TIP,STERILE,50/CS: Brand: MEDLINE

## (undated) DEVICE — SUTURE FIBERWIRE SZ 2 W/ TAPERED NEEDLE BLUE L38IN NONABSORB BLU L26.5MM 1/2 CIRCLE AR7200

## (undated) DEVICE — SOLUTION IV IRRIG 500ML 0.9% SODIUM CHL 2F7123

## (undated) DEVICE — NEEDLE HYPO 25GA L1.5IN BLU POLYPR HUB S STL REG BVL STR

## (undated) DEVICE — CO2 CANNULA,SSOFT,ADLT,7O2,7CO2,M,BLK: Brand: MEDLINE

## (undated) DEVICE — 3M™ STERI-STRIP™ REINFORCED ADHESIVE SKIN CLOSURES, R1540, 1/8 IN X 3 IN (3 MM X 75 MM), 5 STRIPS/ENVELOPE: Brand: 3M™ STERI-STRIP™

## (undated) DEVICE — CONNECTOR IV TB L28MM CLR VLV ACCS NDLLSS DISP MAXPLUS

## (undated) DEVICE — MEDI-VAC NON-CONDUCTIVE SUCTION TUBING: Brand: CARDINAL HEALTH

## (undated) DEVICE — CHLORAPREP 26ML ORANGE

## (undated) DEVICE — COVER,MAYO STAND,STERILE: Brand: MEDLINE

## (undated) DEVICE — KIT INFUS PMP 270ML 4ML/HR 2ML/SITE SOAK CATH L5IN N NARC

## (undated) DEVICE — ADHESIVE SKIN CLSR 0.7ML TOP DERMBND ADV

## (undated) DEVICE — ELECTRODE PT RET AD L9FT HI MOIST COND ADH HYDRGEL CORDED

## (undated) DEVICE — GOWN SIRUS NONREIN XL W/TWL: Brand: MEDLINE INDUSTRIES, INC.

## (undated) DEVICE — SUTURE ABSORBABLE BRAIDED 2-0 CT-1 27 IN UD VICRYL J259H

## (undated) DEVICE — 3M™ STERI-STRIP™ COMPOUND BENZOIN TINCTURE 40 BAGS/CARTON 4 CARTONS/CASE C1544: Brand: 3M™ STERI-STRIP™

## (undated) DEVICE — SUTURE PERMAHAND SZ 2-0 L18IN NONABSORBABLE BLK L26MM SH C012D

## (undated) DEVICE — TUBE SUCT L10IN MINI FLTR CRV KAMVAC

## (undated) DEVICE — GLOVE ORANGE PI 7 1/2   MSG9075

## (undated) DEVICE — SHEATH INTRO 17GA L8IN TUNN DISP ON-Q

## (undated) DEVICE — HEWSON SUTURE RETRIEVER: Brand: HEWSON SUTURE RETRIEVER

## (undated) DEVICE — GAUZE,SPONGE,4"X4",8PLY,STRL,LF,10/TRAY: Brand: MEDLINE

## (undated) DEVICE — BIT DRL OD2.0MM MRK

## (undated) DEVICE — Device

## (undated) DEVICE — SPONGE LAP W18XL18IN WHT COT 4 PLY FLD STRUNG RADPQ DISP ST

## (undated) DEVICE — DRAPE C ARM UNIV W41XL74IN CLR PLAS XR VELC CLSR POLY STRP

## (undated) DEVICE — SUTURE PERMA-HAND SZ 2-0 L30IN NONABSORBABLE BLK L26MM SH K833H

## (undated) DEVICE — SUTURE PROL SZ 1 L30IN NONABSORBABLE BLU CTX L48MM 1/2 CIR 8455H

## (undated) DEVICE — TOTAL HIP PK

## (undated) DEVICE — TOTAL TRAY, DB, 100% SILI FOLEY, 16FR 10: Brand: MEDLINE

## (undated) DEVICE — SYRINGE MED 10ML LUERLOCK TIP W/O SFTY DISP

## (undated) DEVICE — SYRINGE 30CC LUER LOCK: Brand: CARDINAL HEALTH

## (undated) DEVICE — GLOVE ORANGE PI 8 1/2   MSG9085

## (undated) DEVICE — PACK,UNIVERSAL,SPLIT,II,AURORA: Brand: MEDLINE

## (undated) DEVICE — INTENDED FOR TISSUE SEPARATION, AND OTHER PROCEDURES THAT REQUIRE A SHARP SURGICAL BLADE TO PUNCTURE OR CUT.: Brand: BARD-PARKER ® STAINLESS STEEL BLADES

## (undated) DEVICE — SUTURE VCRL SZ 3-0 L18IN ABSRB UD L26MM SH 1/2 CIR J864D

## (undated) DEVICE — SUTURE VCRL + SZ 0 L27IN ABSRB UD L36MM CT-1 1/2 CIR VCPP41D

## (undated) DEVICE — MAJOR SET UP PK

## (undated) DEVICE — SUTURE VCRL SZ 4-0 L18IN ABSRB UD L19MM PS-2 3/8 CIR PRIM J496H

## (undated) DEVICE — DUAL CUT SAGITTAL BLADE

## (undated) DEVICE — GOWN,AURORA,NONREINF,RAGLAN,XXL,STERILE: Brand: MEDLINE

## (undated) DEVICE — 3M™ TEGADERM™ TRANSPARENT FILM DRESSING FRAME STYLE, 1626W, 4 IN X 4-3/4 IN (10 CM X 12 CM), 50/CT 4CT/CASE: Brand: 3M™ TEGADERM™